# Patient Record
Sex: MALE | Race: ASIAN | NOT HISPANIC OR LATINO | Employment: UNEMPLOYED | URBAN - METROPOLITAN AREA
[De-identification: names, ages, dates, MRNs, and addresses within clinical notes are randomized per-mention and may not be internally consistent; named-entity substitution may affect disease eponyms.]

---

## 2017-01-13 ENCOUNTER — OFFICE VISIT - HEALTHEAST (OUTPATIENT)
Dept: FAMILY MEDICINE | Facility: CLINIC | Age: 67
End: 2017-01-13

## 2017-01-13 DIAGNOSIS — L30.1 ECZEMA, DYSHIDROTIC: ICD-10-CM

## 2017-01-13 DIAGNOSIS — I69.391 DYSPHAGIA AS LATE EFFECT OF STROKE: ICD-10-CM

## 2017-01-13 DIAGNOSIS — I10 HYPERTENSION: ICD-10-CM

## 2017-01-13 DIAGNOSIS — L30.9 DERMATITIS: ICD-10-CM

## 2017-01-13 DIAGNOSIS — F32.4 MAJOR DEPRESSIVE DISORDER WITH SINGLE EPISODE, IN PARTIAL REMISSION (H): ICD-10-CM

## 2017-01-23 ENCOUNTER — OFFICE VISIT - HEALTHEAST (OUTPATIENT)
Dept: CARDIOLOGY | Facility: CLINIC | Age: 67
End: 2017-01-23

## 2017-01-23 DIAGNOSIS — R06.09 DYSPNEA ON EXERTION: ICD-10-CM

## 2017-01-23 DIAGNOSIS — E78.5 HYPERLIPIDEMIA: ICD-10-CM

## 2017-01-23 DIAGNOSIS — R07.2 PRECORDIAL PAIN: ICD-10-CM

## 2017-01-23 DIAGNOSIS — Z86.73 HISTORY OF STROKE: ICD-10-CM

## 2017-01-23 DIAGNOSIS — I10 ESSENTIAL HYPERTENSION: ICD-10-CM

## 2017-01-23 ASSESSMENT — MIFFLIN-ST. JEOR: SCORE: 1176.47

## 2017-01-24 ENCOUNTER — AMBULATORY - HEALTHEAST (OUTPATIENT)
Dept: CARDIOLOGY | Facility: CLINIC | Age: 67
End: 2017-01-24

## 2017-01-24 DIAGNOSIS — R07.89 CHEST TIGHTNESS: ICD-10-CM

## 2017-01-24 DIAGNOSIS — R79.89 POSITIVE D DIMER: ICD-10-CM

## 2017-01-24 DIAGNOSIS — I10 HTN (HYPERTENSION): ICD-10-CM

## 2017-01-24 DIAGNOSIS — R06.00 DYSPNEA: ICD-10-CM

## 2017-01-26 ENCOUNTER — HOSPITAL ENCOUNTER (OUTPATIENT)
Dept: CT IMAGING | Facility: HOSPITAL | Age: 67
Discharge: HOME | End: 2017-01-26
Attending: INTERNAL MEDICINE

## 2017-01-26 DIAGNOSIS — R79.89 POSITIVE D DIMER: ICD-10-CM

## 2017-01-26 DIAGNOSIS — R07.89 CHEST TIGHTNESS: ICD-10-CM

## 2017-01-26 DIAGNOSIS — R06.00 DYSPNEA: ICD-10-CM

## 2017-01-31 ENCOUNTER — HOSPITAL ENCOUNTER (OUTPATIENT)
Dept: CARDIOLOGY | Facility: HOSPITAL | Age: 67
Discharge: HOME | End: 2017-01-31
Attending: INTERNAL MEDICINE

## 2017-01-31 ENCOUNTER — OFFICE VISIT - HEALTHEAST (OUTPATIENT)
Dept: FAMILY MEDICINE | Facility: CLINIC | Age: 67
End: 2017-01-31

## 2017-01-31 ENCOUNTER — HOSPITAL ENCOUNTER (OUTPATIENT)
Dept: NUCLEAR MEDICINE | Facility: HOSPITAL | Age: 67
Discharge: HOME | End: 2017-01-31
Attending: INTERNAL MEDICINE

## 2017-01-31 DIAGNOSIS — R06.09 OTHER FORMS OF DYSPNEA: ICD-10-CM

## 2017-01-31 DIAGNOSIS — F32.A DEPRESSION: ICD-10-CM

## 2017-01-31 DIAGNOSIS — R06.09 DYSPNEA ON EXERTION: ICD-10-CM

## 2017-01-31 DIAGNOSIS — R07.2 PRECORDIAL PAIN: ICD-10-CM

## 2017-01-31 DIAGNOSIS — I10 ESSENTIAL HYPERTENSION: ICD-10-CM

## 2017-01-31 DIAGNOSIS — Z91.148 NONCOMPLIANCE WITH MEDICATIONS: ICD-10-CM

## 2017-01-31 DIAGNOSIS — Z86.73 HISTORY OF STROKE: ICD-10-CM

## 2017-01-31 LAB
CV STRESS CURRENT BP HE: NORMAL
CV STRESS CURRENT HR HE: 100
CV STRESS CURRENT HR HE: 102
CV STRESS CURRENT HR HE: 63
CV STRESS CURRENT HR HE: 64
CV STRESS CURRENT HR HE: 72
CV STRESS CURRENT HR HE: 90
CV STRESS CURRENT HR HE: 91
CV STRESS CURRENT HR HE: 92
CV STRESS CURRENT HR HE: 95
CV STRESS CURRENT HR HE: 96
CV STRESS CURRENT HR HE: 97
CV STRESS CURRENT HR HE: 97
CV STRESS CURRENT HR HE: 98
CV STRESS DEVIATION TIME HE: NORMAL
CV STRESS EXERCISE STAGE HE: NORMAL
CV STRESS FINAL RESTING BP HE: NORMAL
CV STRESS FINAL RESTING HR HE: 92
CV STRESS MAX HR HE: 101
CV STRESS MAX TREADMILL GRADE HE: 0
CV STRESS MAX TREADMILL SPEED HE: 0
CV STRESS PEAK DIA BP HE: NORMAL
CV STRESS PEAK SYS BP HE: NORMAL
CV STRESS PHASE HE: NORMAL
CV STRESS PROTOCOL HE: NORMAL
CV STRESS RESTING PT POSITION HE: NORMAL
CV STRESS ST DEVIATION AMOUNT HE: NORMAL
CV STRESS ST DEVIATION ELEVATION HE: NORMAL
CV STRESS ST EVELATION AMOUNT HE: NORMAL
CV STRESS TEST TYPE HE: NORMAL
CV STRESS TOTAL STAGE TIME MIN 1 HE: NORMAL
NUC STRESS EJECTION FRACTION: 73 %
STRESS ECHO BASELINE BP: NORMAL
STRESS ECHO BASELINE HR: 60
STRESS ECHO LAST STRESS BP: NORMAL
STRESS ECHO LAST STRESS HR: 102

## 2017-01-31 ASSESSMENT — MIFFLIN-ST. JEOR
SCORE: 1176.41
SCORE: 1190.01

## 2017-02-06 ENCOUNTER — COMMUNICATION - HEALTHEAST (OUTPATIENT)
Dept: CARDIOLOGY | Facility: CLINIC | Age: 67
End: 2017-02-06

## 2017-02-10 ENCOUNTER — OFFICE VISIT - HEALTHEAST (OUTPATIENT)
Dept: FAMILY MEDICINE | Facility: CLINIC | Age: 67
End: 2017-02-10

## 2017-02-10 DIAGNOSIS — L30.9 DERMATITIS: ICD-10-CM

## 2017-02-10 DIAGNOSIS — F32.A DEPRESSION: ICD-10-CM

## 2017-02-10 DIAGNOSIS — I10 HYPERTENSION: ICD-10-CM

## 2017-02-10 DIAGNOSIS — R07.2 PRECORDIAL PAIN: ICD-10-CM

## 2017-02-10 DIAGNOSIS — R63.0 ANOREXIA: ICD-10-CM

## 2017-04-11 ENCOUNTER — OFFICE VISIT - HEALTHEAST (OUTPATIENT)
Dept: FAMILY MEDICINE | Facility: CLINIC | Age: 67
End: 2017-04-11

## 2017-04-11 DIAGNOSIS — F32.A DEPRESSION: ICD-10-CM

## 2017-04-11 DIAGNOSIS — I10 HYPERTENSION: ICD-10-CM

## 2017-04-11 DIAGNOSIS — Z86.73 HISTORY OF STROKE: ICD-10-CM

## 2017-04-11 DIAGNOSIS — E78.5 HYPERLIPIDEMIA: ICD-10-CM

## 2017-06-26 ENCOUNTER — COMMUNICATION - HEALTHEAST (OUTPATIENT)
Dept: FAMILY MEDICINE | Facility: CLINIC | Age: 67
End: 2017-06-26

## 2017-06-26 DIAGNOSIS — I63.9 CVA (CEREBRAL VASCULAR ACCIDENT) (H): ICD-10-CM

## 2017-07-24 ENCOUNTER — COMMUNICATION - HEALTHEAST (OUTPATIENT)
Dept: FAMILY MEDICINE | Facility: CLINIC | Age: 67
End: 2017-07-24

## 2017-08-11 ENCOUNTER — OFFICE VISIT - HEALTHEAST (OUTPATIENT)
Dept: FAMILY MEDICINE | Facility: CLINIC | Age: 67
End: 2017-08-11

## 2017-08-11 DIAGNOSIS — I63.9 CVA (CEREBRAL VASCULAR ACCIDENT) (H): ICD-10-CM

## 2017-08-11 DIAGNOSIS — R04.2 COUGHING BLOOD: ICD-10-CM

## 2017-08-11 DIAGNOSIS — E78.5 HYPERLIPIDEMIA: ICD-10-CM

## 2017-08-11 DIAGNOSIS — R63.0 ANOREXIA: ICD-10-CM

## 2017-08-11 DIAGNOSIS — J31.0 RHINITIS: ICD-10-CM

## 2017-08-11 DIAGNOSIS — Z23 ENCOUNTER TO VACCINATE PATIENT: ICD-10-CM

## 2017-08-11 DIAGNOSIS — F32.A DEPRESSION: ICD-10-CM

## 2017-08-11 DIAGNOSIS — Z86.73 HISTORY OF STROKE: ICD-10-CM

## 2017-09-07 ENCOUNTER — OFFICE VISIT - HEALTHEAST (OUTPATIENT)
Dept: FAMILY MEDICINE | Facility: CLINIC | Age: 67
End: 2017-09-07

## 2017-09-07 DIAGNOSIS — I10 HYPERTENSION: ICD-10-CM

## 2017-09-07 DIAGNOSIS — E78.5 HYPERLIPIDEMIA: ICD-10-CM

## 2017-09-07 DIAGNOSIS — F32.A DEPRESSION: ICD-10-CM

## 2017-09-07 DIAGNOSIS — J31.0 RHINITIS: ICD-10-CM

## 2017-09-07 DIAGNOSIS — Z12.11 SCREEN FOR COLON CANCER: ICD-10-CM

## 2017-09-07 DIAGNOSIS — I69.391 DYSPHAGIA AS LATE EFFECT OF STROKE: ICD-10-CM

## 2017-09-07 DIAGNOSIS — Z00.00 PREVENTATIVE HEALTH CARE: ICD-10-CM

## 2017-09-20 ENCOUNTER — AMBULATORY - HEALTHEAST (OUTPATIENT)
Dept: NURSING | Facility: CLINIC | Age: 67
End: 2017-09-20

## 2017-09-26 ENCOUNTER — RECORDS - HEALTHEAST (OUTPATIENT)
Dept: ADMINISTRATIVE | Facility: OTHER | Age: 67
End: 2017-09-26

## 2017-10-11 ENCOUNTER — OFFICE VISIT - HEALTHEAST (OUTPATIENT)
Dept: FAMILY MEDICINE | Facility: CLINIC | Age: 67
End: 2017-10-11

## 2017-10-11 DIAGNOSIS — F32.4 MAJOR DEPRESSIVE DISORDER WITH SINGLE EPISODE, IN PARTIAL REMISSION (H): ICD-10-CM

## 2017-10-11 DIAGNOSIS — Z00.00 PREVENTATIVE HEALTH CARE: ICD-10-CM

## 2017-10-11 DIAGNOSIS — E78.00 PURE HYPERCHOLESTEROLEMIA: ICD-10-CM

## 2017-10-11 DIAGNOSIS — F51.02 ADJUSTMENT INSOMNIA: ICD-10-CM

## 2017-10-11 DIAGNOSIS — I10 ESSENTIAL HYPERTENSION: ICD-10-CM

## 2017-10-11 LAB
CHOLEST SERPL-MCNC: 192 MG/DL
FASTING STATUS PATIENT QL REPORTED: YES
HDLC SERPL-MCNC: 45 MG/DL
LDLC SERPL CALC-MCNC: 110 MG/DL
TRIGL SERPL-MCNC: 185 MG/DL

## 2017-11-20 ENCOUNTER — COMMUNICATION - HEALTHEAST (OUTPATIENT)
Dept: FAMILY MEDICINE | Facility: CLINIC | Age: 67
End: 2017-11-20

## 2017-11-20 DIAGNOSIS — K21.9 GERD (GASTROESOPHAGEAL REFLUX DISEASE): ICD-10-CM

## 2017-11-29 ENCOUNTER — OFFICE VISIT - HEALTHEAST (OUTPATIENT)
Dept: FAMILY MEDICINE | Facility: CLINIC | Age: 67
End: 2017-11-29

## 2017-11-29 DIAGNOSIS — J40 BRONCHITIS: ICD-10-CM

## 2017-11-29 DIAGNOSIS — I10 ESSENTIAL HYPERTENSION: ICD-10-CM

## 2017-11-29 DIAGNOSIS — F51.02 ADJUSTMENT INSOMNIA: ICD-10-CM

## 2017-11-29 DIAGNOSIS — K21.9 GASTROESOPHAGEAL REFLUX DISEASE WITHOUT ESOPHAGITIS: ICD-10-CM

## 2017-11-29 DIAGNOSIS — J31.0 RHINITIS: ICD-10-CM

## 2017-12-07 ENCOUNTER — OFFICE VISIT - HEALTHEAST (OUTPATIENT)
Dept: FAMILY MEDICINE | Facility: CLINIC | Age: 67
End: 2017-12-07

## 2017-12-07 DIAGNOSIS — R05.9 COUGH: ICD-10-CM

## 2017-12-07 DIAGNOSIS — J40 BRONCHITIS: ICD-10-CM

## 2017-12-07 DIAGNOSIS — R63.4 WEIGHT LOSS: ICD-10-CM

## 2017-12-12 ENCOUNTER — OFFICE VISIT - HEALTHEAST (OUTPATIENT)
Dept: FAMILY MEDICINE | Facility: CLINIC | Age: 67
End: 2017-12-12

## 2017-12-12 DIAGNOSIS — F32.A DEPRESSION: ICD-10-CM

## 2017-12-12 DIAGNOSIS — R05.3 CHRONIC COUGH: ICD-10-CM

## 2017-12-12 DIAGNOSIS — R63.0 ANOREXIA: ICD-10-CM

## 2017-12-12 DIAGNOSIS — G47.00 INSOMNIA: ICD-10-CM

## 2018-02-21 ENCOUNTER — OFFICE VISIT - HEALTHEAST (OUTPATIENT)
Dept: FAMILY MEDICINE | Facility: CLINIC | Age: 68
End: 2018-02-21

## 2018-02-21 DIAGNOSIS — R63.0 ANOREXIA: ICD-10-CM

## 2018-02-21 DIAGNOSIS — G47.00 INSOMNIA: ICD-10-CM

## 2018-02-21 DIAGNOSIS — F32.A DEPRESSION: ICD-10-CM

## 2018-02-21 DIAGNOSIS — I10 ESSENTIAL HYPERTENSION: ICD-10-CM

## 2018-02-21 DIAGNOSIS — E78.5 HYPERLIPIDEMIA: ICD-10-CM

## 2018-02-21 DIAGNOSIS — R05.3 CHRONIC COUGH: ICD-10-CM

## 2018-04-24 ENCOUNTER — COMMUNICATION - HEALTHEAST (OUTPATIENT)
Dept: FAMILY MEDICINE | Facility: CLINIC | Age: 68
End: 2018-04-24

## 2018-04-24 DIAGNOSIS — F32.4 MAJOR DEPRESSIVE DISORDER WITH SINGLE EPISODE, IN PARTIAL REMISSION (H): ICD-10-CM

## 2018-04-25 ENCOUNTER — COMMUNICATION - HEALTHEAST (OUTPATIENT)
Dept: FAMILY MEDICINE | Facility: CLINIC | Age: 68
End: 2018-04-25

## 2018-04-25 DIAGNOSIS — I63.9 CVA (CEREBRAL VASCULAR ACCIDENT) (H): ICD-10-CM

## 2018-05-10 ENCOUNTER — OFFICE VISIT - HEALTHEAST (OUTPATIENT)
Dept: FAMILY MEDICINE | Facility: CLINIC | Age: 68
End: 2018-05-10

## 2018-05-10 DIAGNOSIS — I69.391 DYSPHAGIA AS LATE EFFECT OF STROKE: ICD-10-CM

## 2018-05-10 DIAGNOSIS — R63.0 ANOREXIA: ICD-10-CM

## 2018-05-10 DIAGNOSIS — F32.4 MAJOR DEPRESSIVE DISORDER WITH SINGLE EPISODE, IN PARTIAL REMISSION (H): ICD-10-CM

## 2018-05-10 DIAGNOSIS — E78.1 PURE HYPERGLYCERIDEMIA: ICD-10-CM

## 2018-05-10 DIAGNOSIS — F51.01 PRIMARY INSOMNIA: ICD-10-CM

## 2018-05-10 DIAGNOSIS — I10 ESSENTIAL HYPERTENSION: ICD-10-CM

## 2018-05-10 ASSESSMENT — MIFFLIN-ST. JEOR: SCORE: 1154.29

## 2018-06-12 ENCOUNTER — COMMUNICATION - HEALTHEAST (OUTPATIENT)
Dept: FAMILY MEDICINE | Facility: CLINIC | Age: 68
End: 2018-06-12

## 2018-06-12 ENCOUNTER — OFFICE VISIT - HEALTHEAST (OUTPATIENT)
Dept: FAMILY MEDICINE | Facility: CLINIC | Age: 68
End: 2018-06-12

## 2018-06-12 DIAGNOSIS — F51.02 ADJUSTMENT INSOMNIA: ICD-10-CM

## 2018-06-12 DIAGNOSIS — J00 OTHER ACUTE RHINITIS: ICD-10-CM

## 2018-06-12 DIAGNOSIS — F32.4 MAJOR DEPRESSIVE DISORDER WITH SINGLE EPISODE, IN PARTIAL REMISSION (H): ICD-10-CM

## 2018-06-12 DIAGNOSIS — I10 HYPERTENSION: ICD-10-CM

## 2018-06-12 DIAGNOSIS — F32.9 REACTIVE DEPRESSION: ICD-10-CM

## 2018-06-12 ASSESSMENT — MIFFLIN-ST. JEOR: SCORE: 1162.8

## 2018-06-26 ENCOUNTER — RECORDS - HEALTHEAST (OUTPATIENT)
Dept: ADMINISTRATIVE | Facility: OTHER | Age: 68
End: 2018-06-26

## 2018-06-27 ENCOUNTER — OFFICE VISIT - HEALTHEAST (OUTPATIENT)
Dept: FAMILY MEDICINE | Facility: CLINIC | Age: 68
End: 2018-06-27

## 2018-06-27 DIAGNOSIS — R42 DIZZINESS: ICD-10-CM

## 2018-06-27 DIAGNOSIS — R53.83 FATIGUE: ICD-10-CM

## 2018-06-27 DIAGNOSIS — I10 HYPERTENSION: ICD-10-CM

## 2018-06-27 DIAGNOSIS — F51.01 PRIMARY INSOMNIA: ICD-10-CM

## 2018-06-27 LAB
BASOPHILS # BLD AUTO: 0.1 THOU/UL (ref 0–0.2)
BASOPHILS NFR BLD AUTO: 1 % (ref 0–2)
EOSINOPHIL # BLD AUTO: 0.9 THOU/UL (ref 0–0.4)
EOSINOPHIL NFR BLD AUTO: 9 % (ref 0–6)
ERYTHROCYTE [DISTWIDTH] IN BLOOD BY AUTOMATED COUNT: 14.4 % (ref 11–14.5)
HCT VFR BLD AUTO: 37.7 % (ref 40–54)
HGB BLD-MCNC: 12.3 G/DL (ref 14–18)
LYMPHOCYTES # BLD AUTO: 2.3 THOU/UL (ref 0.8–4.4)
LYMPHOCYTES NFR BLD AUTO: 25 % (ref 20–40)
MCH RBC QN AUTO: 28 PG (ref 27–34)
MCHC RBC AUTO-ENTMCNC: 32.7 G/DL (ref 32–36)
MCV RBC AUTO: 86 FL (ref 80–100)
MONOCYTES # BLD AUTO: 0.9 THOU/UL (ref 0–0.9)
MONOCYTES NFR BLD AUTO: 10 % (ref 2–10)
NEUTROPHILS # BLD AUTO: 5.2 THOU/UL (ref 2–7.7)
NEUTROPHILS NFR BLD AUTO: 55 % (ref 50–70)
PLATELET # BLD AUTO: 384 THOU/UL (ref 140–440)
PMV BLD AUTO: 7.4 FL (ref 7–10)
RBC # BLD AUTO: 4.4 MILL/UL (ref 4.4–6.2)
WBC: 9.4 THOU/UL (ref 4–11)

## 2018-06-27 ASSESSMENT — MIFFLIN-ST. JEOR: SCORE: 1176.69

## 2018-06-28 LAB
ANION GAP SERPL CALCULATED.3IONS-SCNC: 8 MMOL/L (ref 5–18)
BUN SERPL-MCNC: 14 MG/DL (ref 8–22)
CALCIUM SERPL-MCNC: 9.3 MG/DL (ref 8.5–10.5)
CHLORIDE BLD-SCNC: 105 MMOL/L (ref 98–107)
CO2 SERPL-SCNC: 26 MMOL/L (ref 22–31)
CREAT SERPL-MCNC: 1.28 MG/DL (ref 0.7–1.3)
GFR SERPL CREATININE-BSD FRML MDRD: 56 ML/MIN/1.73M2
GLUCOSE BLD-MCNC: 122 MG/DL (ref 70–125)
POTASSIUM BLD-SCNC: 4.8 MMOL/L (ref 3.5–5)
SODIUM SERPL-SCNC: 139 MMOL/L (ref 136–145)

## 2018-07-12 ENCOUNTER — COMMUNICATION - HEALTHEAST (OUTPATIENT)
Dept: FAMILY MEDICINE | Facility: CLINIC | Age: 68
End: 2018-07-12

## 2018-10-03 ENCOUNTER — COMMUNICATION - HEALTHEAST (OUTPATIENT)
Dept: FAMILY MEDICINE | Facility: CLINIC | Age: 68
End: 2018-10-03

## 2018-10-03 DIAGNOSIS — E78.1 PURE HYPERGLYCERIDEMIA: ICD-10-CM

## 2018-11-27 ENCOUNTER — OFFICE VISIT - HEALTHEAST (OUTPATIENT)
Dept: FAMILY MEDICINE | Facility: CLINIC | Age: 68
End: 2018-11-27

## 2018-11-27 DIAGNOSIS — F51.01 PRIMARY INSOMNIA: ICD-10-CM

## 2018-11-27 DIAGNOSIS — R05.3 CHRONIC COUGH: ICD-10-CM

## 2018-11-27 DIAGNOSIS — R63.0 ANOREXIA: ICD-10-CM

## 2018-11-27 DIAGNOSIS — I10 ESSENTIAL HYPERTENSION: ICD-10-CM

## 2018-11-27 DIAGNOSIS — F32.9 REACTIVE DEPRESSION: ICD-10-CM

## 2018-11-27 ASSESSMENT — MIFFLIN-ST. JEOR: SCORE: 1163.65

## 2019-01-10 ENCOUNTER — COMMUNICATION - HEALTHEAST (OUTPATIENT)
Dept: FAMILY MEDICINE | Facility: CLINIC | Age: 69
End: 2019-01-10

## 2019-01-10 DIAGNOSIS — K21.9 GASTROESOPHAGEAL REFLUX DISEASE WITHOUT ESOPHAGITIS: ICD-10-CM

## 2019-02-11 ENCOUNTER — COMMUNICATION - HEALTHEAST (OUTPATIENT)
Dept: FAMILY MEDICINE | Facility: CLINIC | Age: 69
End: 2019-02-11

## 2019-02-11 DIAGNOSIS — F32.9 REACTIVE DEPRESSION: ICD-10-CM

## 2019-02-11 DIAGNOSIS — I63.9 CVA (CEREBRAL VASCULAR ACCIDENT) (H): ICD-10-CM

## 2019-02-12 ENCOUNTER — COMMUNICATION - HEALTHEAST (OUTPATIENT)
Dept: FAMILY MEDICINE | Facility: CLINIC | Age: 69
End: 2019-02-12

## 2019-02-12 DIAGNOSIS — R05.3 CHRONIC COUGH: ICD-10-CM

## 2019-03-20 ENCOUNTER — OFFICE VISIT - HEALTHEAST (OUTPATIENT)
Dept: FAMILY MEDICINE | Facility: CLINIC | Age: 69
End: 2019-03-20

## 2019-03-20 DIAGNOSIS — I95.9 HYPOTENSION, UNSPECIFIED HYPOTENSION TYPE: ICD-10-CM

## 2019-03-20 DIAGNOSIS — J31.0 OTHER RHINITIS: ICD-10-CM

## 2019-03-20 DIAGNOSIS — E78.1 PURE HYPERGLYCERIDEMIA: ICD-10-CM

## 2019-03-20 DIAGNOSIS — F32.9 REACTIVE DEPRESSION: ICD-10-CM

## 2019-03-20 LAB
ANION GAP SERPL CALCULATED.3IONS-SCNC: 11 MMOL/L (ref 5–18)
BASOPHILS # BLD AUTO: 0.1 THOU/UL (ref 0–0.2)
BASOPHILS NFR BLD AUTO: 1 % (ref 0–2)
BUN SERPL-MCNC: 21 MG/DL (ref 8–28)
CALCIUM SERPL-MCNC: 9.5 MG/DL (ref 8.5–10.5)
CHLORIDE BLD-SCNC: 106 MMOL/L (ref 98–107)
CO2 SERPL-SCNC: 23 MMOL/L (ref 22–31)
CREAT SERPL-MCNC: 1.37 MG/DL (ref 0.7–1.3)
EOSINOPHIL # BLD AUTO: 0.7 THOU/UL (ref 0–0.4)
EOSINOPHIL NFR BLD AUTO: 7 % (ref 0–6)
ERYTHROCYTE [DISTWIDTH] IN BLOOD BY AUTOMATED COUNT: 13.6 % (ref 11–14.5)
GFR SERPL CREATININE-BSD FRML MDRD: 51 ML/MIN/1.73M2
GLUCOSE BLD-MCNC: 84 MG/DL (ref 70–125)
HCT VFR BLD AUTO: 39.3 % (ref 40–54)
HGB BLD-MCNC: 13.1 G/DL (ref 14–18)
LYMPHOCYTES # BLD AUTO: 2.5 THOU/UL (ref 0.8–4.4)
LYMPHOCYTES NFR BLD AUTO: 25 % (ref 20–40)
MCH RBC QN AUTO: 29.3 PG (ref 27–34)
MCHC RBC AUTO-ENTMCNC: 33.4 G/DL (ref 32–36)
MCV RBC AUTO: 88 FL (ref 80–100)
MONOCYTES # BLD AUTO: 0.9 THOU/UL (ref 0–0.9)
MONOCYTES NFR BLD AUTO: 9 % (ref 2–10)
NEUTROPHILS # BLD AUTO: 5.8 THOU/UL (ref 2–7.7)
NEUTROPHILS NFR BLD AUTO: 58 % (ref 50–70)
PLATELET # BLD AUTO: 318 THOU/UL (ref 140–440)
PMV BLD AUTO: 6.9 FL (ref 7–10)
POTASSIUM BLD-SCNC: 4.5 MMOL/L (ref 3.5–5)
RBC # BLD AUTO: 4.48 MILL/UL (ref 4.4–6.2)
SODIUM SERPL-SCNC: 140 MMOL/L (ref 136–145)
WBC: 10.1 THOU/UL (ref 4–11)

## 2019-03-20 ASSESSMENT — MIFFLIN-ST. JEOR: SCORE: 1176.41

## 2019-03-28 ENCOUNTER — OFFICE VISIT - HEALTHEAST (OUTPATIENT)
Dept: FAMILY MEDICINE | Facility: CLINIC | Age: 69
End: 2019-03-28

## 2019-03-28 DIAGNOSIS — I10 ESSENTIAL HYPERTENSION: ICD-10-CM

## 2019-03-28 DIAGNOSIS — F51.01 PRIMARY INSOMNIA: ICD-10-CM

## 2019-03-28 DIAGNOSIS — F32.9 REACTIVE DEPRESSION: ICD-10-CM

## 2019-03-28 ASSESSMENT — MIFFLIN-ST. JEOR: SCORE: 1173.85

## 2019-04-10 ENCOUNTER — COMMUNICATION - HEALTHEAST (OUTPATIENT)
Dept: ADMINISTRATIVE | Facility: CLINIC | Age: 69
End: 2019-04-10

## 2019-05-20 ENCOUNTER — COMMUNICATION - HEALTHEAST (OUTPATIENT)
Dept: GERIATRIC MEDICINE | Facility: CLINIC | Age: 69
End: 2019-05-20

## 2019-05-23 ENCOUNTER — COMMUNICATION - HEALTHEAST (OUTPATIENT)
Dept: GERIATRIC MEDICINE | Facility: CLINIC | Age: 69
End: 2019-05-23

## 2019-05-28 ENCOUNTER — COMMUNICATION - HEALTHEAST (OUTPATIENT)
Dept: GERIATRIC MEDICINE | Facility: CLINIC | Age: 69
End: 2019-05-28

## 2019-06-20 ENCOUNTER — OFFICE VISIT - HEALTHEAST (OUTPATIENT)
Dept: FAMILY MEDICINE | Facility: CLINIC | Age: 69
End: 2019-06-20

## 2019-06-20 ENCOUNTER — AMBULATORY - HEALTHEAST (OUTPATIENT)
Dept: FAMILY MEDICINE | Facility: CLINIC | Age: 69
End: 2019-06-20

## 2019-06-20 DIAGNOSIS — Z76.89 ENCOUNTER TO ESTABLISH CARE: ICD-10-CM

## 2019-06-20 DIAGNOSIS — I10 ESSENTIAL HYPERTENSION: ICD-10-CM

## 2019-06-20 DIAGNOSIS — H54.40 BLIND LEFT EYE: ICD-10-CM

## 2019-06-20 DIAGNOSIS — R07.89 CHEST TIGHTNESS OR PRESSURE: ICD-10-CM

## 2019-06-20 DIAGNOSIS — E78.1 PURE HYPERGLYCERIDEMIA: ICD-10-CM

## 2019-06-20 DIAGNOSIS — E78.2 MIXED HYPERLIPIDEMIA: ICD-10-CM

## 2019-06-20 DIAGNOSIS — F32.A DEPRESSION, UNSPECIFIED DEPRESSION TYPE: ICD-10-CM

## 2019-06-20 LAB
ALBUMIN SERPL-MCNC: 3.7 G/DL (ref 3.5–5)
ALP SERPL-CCNC: 92 U/L (ref 45–120)
ALT SERPL W P-5'-P-CCNC: 13 U/L (ref 0–45)
ANION GAP SERPL CALCULATED.3IONS-SCNC: 10 MMOL/L (ref 5–18)
AST SERPL W P-5'-P-CCNC: 18 U/L (ref 0–40)
BILIRUB SERPL-MCNC: 0.4 MG/DL (ref 0–1)
BUN SERPL-MCNC: 15 MG/DL (ref 8–28)
CALCIUM SERPL-MCNC: 9.5 MG/DL (ref 8.5–10.5)
CHLORIDE BLD-SCNC: 107 MMOL/L (ref 98–107)
CO2 SERPL-SCNC: 24 MMOL/L (ref 22–31)
CREAT SERPL-MCNC: 1.17 MG/DL (ref 0.7–1.3)
GFR SERPL CREATININE-BSD FRML MDRD: >60 ML/MIN/1.73M2
GLUCOSE BLD-MCNC: 84 MG/DL (ref 70–125)
POTASSIUM BLD-SCNC: 4.8 MMOL/L (ref 3.5–5)
PROT SERPL-MCNC: 7.7 G/DL (ref 6–8)
SODIUM SERPL-SCNC: 141 MMOL/L (ref 136–145)

## 2019-06-20 RX ORDER — BLOOD-GLUC.METER, WRIST BP MON
KIT MISCELLANEOUS
Qty: 1 DEVICE | Refills: 0 | Status: SHIPPED | OUTPATIENT
Start: 2019-06-20

## 2019-06-20 ASSESSMENT — MIFFLIN-ST. JEOR: SCORE: 1162.28

## 2019-06-24 ENCOUNTER — HOSPITAL ENCOUNTER (OUTPATIENT)
Dept: CARDIOLOGY | Facility: HOSPITAL | Age: 69
Discharge: HOME | End: 2019-06-24
Attending: FAMILY MEDICINE

## 2019-06-24 DIAGNOSIS — R07.89 CHEST TIGHTNESS OR PRESSURE: ICD-10-CM

## 2019-06-24 LAB
CV STRESS CURRENT BP HE: NORMAL
CV STRESS CURRENT HR HE: 56
CV STRESS CURRENT HR HE: 56
CV STRESS CURRENT HR HE: 57
CV STRESS CURRENT HR HE: 57
CV STRESS CURRENT HR HE: 58
CV STRESS CURRENT HR HE: 59
CV STRESS CURRENT HR HE: 60
CV STRESS CURRENT HR HE: 62
CV STRESS CURRENT HR HE: 67
CV STRESS CURRENT HR HE: 69
CV STRESS CURRENT HR HE: 70
CV STRESS CURRENT HR HE: 72
CV STRESS CURRENT HR HE: 72
CV STRESS CURRENT HR HE: 76
CV STRESS CURRENT HR HE: 77
CV STRESS CURRENT HR HE: 77
CV STRESS DEVIATION TIME HE: NORMAL
CV STRESS ECHO PERCENT HR HE: NORMAL
CV STRESS EXERCISE STAGE HE: NORMAL
CV STRESS EXERCISE STAGE REACHED HE: NORMAL
CV STRESS FINAL RESTING BP HE: NORMAL
CV STRESS FINAL RESTING HR HE: 60
CV STRESS MAX HR HE: 77
CV STRESS MAX TREADMILL GRADE HE: 0
CV STRESS MAX TREADMILL SPEED HE: 1.7
CV STRESS PEAK DIA BP HE: NORMAL
CV STRESS PEAK SYS BP HE: NORMAL
CV STRESS PHASE HE: NORMAL
CV STRESS PROTOCOL HE: NORMAL
CV STRESS RESTING PT POSITION HE: NORMAL
CV STRESS RESTING PT POSITION HE: NORMAL
CV STRESS ST DEVIATION AMOUNT HE: NORMAL
CV STRESS ST DEVIATION ELEVATION HE: NORMAL
CV STRESS ST EVELATION AMOUNT HE: NORMAL
CV STRESS TEST TYPE HE: NORMAL
CV STRESS TOTAL STAGE TIME MIN 1 HE: NORMAL
STRESS ECHO BASELINE BP: NORMAL
STRESS ECHO BASELINE HR: 63
STRESS ECHO CALCULATED PERCENT HR: 51 %
STRESS ECHO LAST STRESS BP: NORMAL
STRESS ECHO LAST STRESS HR: 77
STRESS ECHO POST ESTIMATED WORKLOAD: 2.4
STRESS ECHO POST EXERCISE DUR MIN: 1
STRESS ECHO POST EXERCISE DUR SEC: 32
STRESS ECHO TARGET HR: 76.5

## 2019-07-18 ENCOUNTER — OFFICE VISIT - HEALTHEAST (OUTPATIENT)
Dept: FAMILY MEDICINE | Facility: CLINIC | Age: 69
End: 2019-07-18

## 2019-07-18 DIAGNOSIS — E78.2 MIXED HYPERLIPIDEMIA: ICD-10-CM

## 2019-07-18 DIAGNOSIS — R07.89 CHEST TIGHTNESS OR PRESSURE: ICD-10-CM

## 2019-07-18 DIAGNOSIS — F32.A DEPRESSION, UNSPECIFIED DEPRESSION TYPE: ICD-10-CM

## 2019-07-18 DIAGNOSIS — I10 ESSENTIAL HYPERTENSION: ICD-10-CM

## 2019-07-18 ASSESSMENT — MIFFLIN-ST. JEOR: SCORE: 1150.81

## 2019-10-07 ENCOUNTER — COMMUNICATION - HEALTHEAST (OUTPATIENT)
Dept: FAMILY MEDICINE | Facility: CLINIC | Age: 69
End: 2019-10-07

## 2019-10-07 DIAGNOSIS — I63.9 CVA (CEREBRAL VASCULAR ACCIDENT) (H): ICD-10-CM

## 2019-10-07 DIAGNOSIS — E78.1 PURE HYPERGLYCERIDEMIA: ICD-10-CM

## 2019-11-14 ENCOUNTER — COMMUNICATION - HEALTHEAST (OUTPATIENT)
Dept: GERIATRIC MEDICINE | Facility: CLINIC | Age: 69
End: 2019-11-14

## 2019-12-20 ENCOUNTER — OFFICE VISIT - HEALTHEAST (OUTPATIENT)
Dept: FAMILY MEDICINE | Facility: CLINIC | Age: 69
End: 2019-12-20

## 2019-12-20 ENCOUNTER — COMMUNICATION - HEALTHEAST (OUTPATIENT)
Dept: FAMILY MEDICINE | Facility: CLINIC | Age: 69
End: 2019-12-20

## 2019-12-20 DIAGNOSIS — E78.2 MIXED HYPERLIPIDEMIA: ICD-10-CM

## 2019-12-20 DIAGNOSIS — Z00.00 WELLNESS EXAMINATION: ICD-10-CM

## 2019-12-20 DIAGNOSIS — F32.1 CURRENT MODERATE EPISODE OF MAJOR DEPRESSIVE DISORDER, UNSPECIFIED WHETHER RECURRENT (H): ICD-10-CM

## 2019-12-20 DIAGNOSIS — Z23 IMMUNIZATION DUE: ICD-10-CM

## 2019-12-20 DIAGNOSIS — R41.3 MEMORY PROBLEM: ICD-10-CM

## 2019-12-20 DIAGNOSIS — I10 ESSENTIAL HYPERTENSION: ICD-10-CM

## 2019-12-20 LAB
CHOLEST SERPL-MCNC: 156 MG/DL
FASTING STATUS PATIENT QL REPORTED: NO
HDLC SERPL-MCNC: 44 MG/DL
LDLC SERPL CALC-MCNC: 84 MG/DL
TRIGL SERPL-MCNC: 138 MG/DL

## 2019-12-20 ASSESSMENT — MIFFLIN-ST. JEOR: SCORE: 1152.65

## 2019-12-20 ASSESSMENT — PATIENT HEALTH QUESTIONNAIRE - PHQ9: SUM OF ALL RESPONSES TO PHQ QUESTIONS 1-9: 7

## 2019-12-22 ENCOUNTER — COMMUNICATION - HEALTHEAST (OUTPATIENT)
Dept: FAMILY MEDICINE | Facility: CLINIC | Age: 69
End: 2019-12-22

## 2019-12-22 DIAGNOSIS — K21.9 GASTROESOPHAGEAL REFLUX DISEASE WITHOUT ESOPHAGITIS: ICD-10-CM

## 2019-12-26 ENCOUNTER — AMBULATORY - HEALTHEAST (OUTPATIENT)
Dept: FAMILY MEDICINE | Facility: CLINIC | Age: 69
End: 2019-12-26

## 2020-02-05 ENCOUNTER — COMMUNICATION - HEALTHEAST (OUTPATIENT)
Dept: FAMILY MEDICINE | Facility: CLINIC | Age: 70
End: 2020-02-05

## 2020-02-05 DIAGNOSIS — F32.9 REACTIVE DEPRESSION: ICD-10-CM

## 2020-02-06 ENCOUNTER — COMMUNICATION - HEALTHEAST (OUTPATIENT)
Dept: FAMILY MEDICINE | Facility: CLINIC | Age: 70
End: 2020-02-06

## 2020-02-06 DIAGNOSIS — J00 ACUTE RHINITIS: ICD-10-CM

## 2020-04-03 ENCOUNTER — OFFICE VISIT - HEALTHEAST (OUTPATIENT)
Dept: FAMILY MEDICINE | Facility: CLINIC | Age: 70
End: 2020-04-03

## 2020-04-03 ENCOUNTER — COMMUNICATION - HEALTHEAST (OUTPATIENT)
Dept: GERIATRIC MEDICINE | Facility: CLINIC | Age: 70
End: 2020-04-03

## 2020-04-03 DIAGNOSIS — I10 ESSENTIAL HYPERTENSION: ICD-10-CM

## 2020-04-03 DIAGNOSIS — R41.3 MEMORY PROBLEM: ICD-10-CM

## 2020-04-03 DIAGNOSIS — E78.2 MIXED HYPERLIPIDEMIA: ICD-10-CM

## 2020-04-03 DIAGNOSIS — R05.3 CHRONIC COUGH: ICD-10-CM

## 2020-04-03 DIAGNOSIS — K21.9 GASTROESOPHAGEAL REFLUX DISEASE WITHOUT ESOPHAGITIS: ICD-10-CM

## 2020-04-03 DIAGNOSIS — F32.9 REACTIVE DEPRESSION: ICD-10-CM

## 2020-04-03 DIAGNOSIS — E78.1 PURE HYPERGLYCERIDEMIA: ICD-10-CM

## 2020-04-04 ASSESSMENT — ACTIVITIES OF DAILY LIVING (ADL)
DEPENDENT_IADLS:: CLEANING;COOKING;LAUNDRY;SHOPPING;MEAL PREPARATION;MEDICATION MANAGEMENT;MONEY MANAGEMENT;TRANSPORTATION;INCONTINENCE

## 2020-04-06 ENCOUNTER — COMMUNICATION - HEALTHEAST (OUTPATIENT)
Dept: NURSING | Facility: CLINIC | Age: 70
End: 2020-04-06

## 2020-04-06 ENCOUNTER — COMMUNICATION - HEALTHEAST (OUTPATIENT)
Dept: GERIATRIC MEDICINE | Facility: CLINIC | Age: 70
End: 2020-04-06

## 2020-04-15 ENCOUNTER — COMMUNICATION - HEALTHEAST (OUTPATIENT)
Dept: CARE COORDINATION | Facility: CLINIC | Age: 70
End: 2020-04-15

## 2020-04-17 ENCOUNTER — COMMUNICATION - HEALTHEAST (OUTPATIENT)
Dept: NURSING | Facility: CLINIC | Age: 70
End: 2020-04-17

## 2020-05-18 ENCOUNTER — COMMUNICATION - HEALTHEAST (OUTPATIENT)
Dept: NURSING | Facility: CLINIC | Age: 70
End: 2020-05-18

## 2020-06-04 ENCOUNTER — OFFICE VISIT - HEALTHEAST (OUTPATIENT)
Dept: FAMILY MEDICINE | Facility: CLINIC | Age: 70
End: 2020-06-04

## 2020-06-04 DIAGNOSIS — I10 ESSENTIAL HYPERTENSION: ICD-10-CM

## 2020-06-04 DIAGNOSIS — F32.1 CURRENT MODERATE EPISODE OF MAJOR DEPRESSIVE DISORDER, UNSPECIFIED WHETHER RECURRENT (H): ICD-10-CM

## 2020-06-04 DIAGNOSIS — E78.2 MIXED HYPERLIPIDEMIA: ICD-10-CM

## 2020-06-04 ASSESSMENT — PATIENT HEALTH QUESTIONNAIRE - PHQ9: SUM OF ALL RESPONSES TO PHQ QUESTIONS 1-9: 5

## 2020-06-13 ENCOUNTER — COMMUNICATION - HEALTHEAST (OUTPATIENT)
Dept: FAMILY MEDICINE | Facility: CLINIC | Age: 70
End: 2020-06-13

## 2020-06-13 DIAGNOSIS — I63.9 CVA (CEREBRAL VASCULAR ACCIDENT) (H): ICD-10-CM

## 2020-06-17 ENCOUNTER — COMMUNICATION - HEALTHEAST (OUTPATIENT)
Dept: NURSING | Facility: CLINIC | Age: 70
End: 2020-06-17

## 2020-06-24 ENCOUNTER — COMMUNICATION - HEALTHEAST (OUTPATIENT)
Dept: CARE COORDINATION | Facility: CLINIC | Age: 70
End: 2020-06-24

## 2020-07-02 ENCOUNTER — OFFICE VISIT - HEALTHEAST (OUTPATIENT)
Dept: FAMILY MEDICINE | Facility: CLINIC | Age: 70
End: 2020-07-02

## 2020-07-02 DIAGNOSIS — R21 RASH: ICD-10-CM

## 2020-07-02 DIAGNOSIS — E78.2 MIXED HYPERLIPIDEMIA: ICD-10-CM

## 2020-07-02 DIAGNOSIS — I10 ESSENTIAL HYPERTENSION: ICD-10-CM

## 2020-07-17 ENCOUNTER — COMMUNICATION - HEALTHEAST (OUTPATIENT)
Dept: NURSING | Facility: CLINIC | Age: 70
End: 2020-07-17

## 2020-07-23 ENCOUNTER — COMMUNICATION - HEALTHEAST (OUTPATIENT)
Dept: CARE COORDINATION | Facility: CLINIC | Age: 70
End: 2020-07-23

## 2020-09-04 ENCOUNTER — OFFICE VISIT - HEALTHEAST (OUTPATIENT)
Dept: FAMILY MEDICINE | Facility: CLINIC | Age: 70
End: 2020-09-04

## 2020-09-04 DIAGNOSIS — E78.2 MIXED HYPERLIPIDEMIA: ICD-10-CM

## 2020-09-04 DIAGNOSIS — R21 RASH: ICD-10-CM

## 2020-09-04 DIAGNOSIS — F32.1 CURRENT MODERATE EPISODE OF MAJOR DEPRESSIVE DISORDER, UNSPECIFIED WHETHER RECURRENT (H): ICD-10-CM

## 2020-09-04 DIAGNOSIS — I10 ESSENTIAL HYPERTENSION: ICD-10-CM

## 2020-09-04 ASSESSMENT — PATIENT HEALTH QUESTIONNAIRE - PHQ9: SUM OF ALL RESPONSES TO PHQ QUESTIONS 1-9: 3

## 2020-09-08 ENCOUNTER — COMMUNICATION - HEALTHEAST (OUTPATIENT)
Dept: FAMILY MEDICINE | Facility: CLINIC | Age: 70
End: 2020-09-08

## 2020-09-17 ENCOUNTER — COMMUNICATION - HEALTHEAST (OUTPATIENT)
Dept: NURSING | Facility: CLINIC | Age: 70
End: 2020-09-17

## 2020-09-30 ENCOUNTER — COMMUNICATION - HEALTHEAST (OUTPATIENT)
Dept: CARE COORDINATION | Facility: CLINIC | Age: 70
End: 2020-09-30

## 2020-10-14 ENCOUNTER — COMMUNICATION - HEALTHEAST (OUTPATIENT)
Dept: GERIATRIC MEDICINE | Facility: CLINIC | Age: 70
End: 2020-10-14

## 2020-11-03 ENCOUNTER — OFFICE VISIT - HEALTHEAST (OUTPATIENT)
Dept: FAMILY MEDICINE | Facility: CLINIC | Age: 70
End: 2020-11-03

## 2020-11-03 DIAGNOSIS — E78.2 MIXED HYPERLIPIDEMIA: ICD-10-CM

## 2020-11-03 DIAGNOSIS — Z23 IMMUNIZATION DUE: ICD-10-CM

## 2020-11-03 DIAGNOSIS — I10 ESSENTIAL HYPERTENSION: ICD-10-CM

## 2020-11-03 DIAGNOSIS — L08.9 LOCAL INFECTION OF SKIN AND SUBCUTANEOUS TISSUE: ICD-10-CM

## 2020-11-03 LAB
ALBUMIN SERPL-MCNC: 3.8 G/DL (ref 3.5–5)
ALP SERPL-CCNC: 101 U/L (ref 45–120)
ALT SERPL W P-5'-P-CCNC: 9 U/L (ref 0–45)
ANION GAP SERPL CALCULATED.3IONS-SCNC: 7 MMOL/L (ref 5–18)
AST SERPL W P-5'-P-CCNC: 17 U/L (ref 0–40)
BILIRUB SERPL-MCNC: 0.5 MG/DL (ref 0–1)
BUN SERPL-MCNC: 18 MG/DL (ref 8–28)
CALCIUM SERPL-MCNC: 8.9 MG/DL (ref 8.5–10.5)
CHLORIDE BLD-SCNC: 107 MMOL/L (ref 98–107)
CHOLEST SERPL-MCNC: 164 MG/DL
CO2 SERPL-SCNC: 26 MMOL/L (ref 22–31)
CREAT SERPL-MCNC: 1.28 MG/DL (ref 0.7–1.3)
FASTING STATUS PATIENT QL REPORTED: NORMAL
GFR SERPL CREATININE-BSD FRML MDRD: 55 ML/MIN/1.73M2
GLUCOSE BLD-MCNC: 74 MG/DL (ref 70–125)
HDLC SERPL-MCNC: 42 MG/DL
LDLC SERPL CALC-MCNC: 102 MG/DL
POTASSIUM BLD-SCNC: 4.3 MMOL/L (ref 3.5–5)
PROT SERPL-MCNC: 7.7 G/DL (ref 6–8)
SODIUM SERPL-SCNC: 140 MMOL/L (ref 136–145)
TRIGL SERPL-MCNC: 101 MG/DL

## 2020-11-03 ASSESSMENT — MIFFLIN-ST. JEOR: SCORE: 1141.37

## 2020-11-12 ENCOUNTER — OFFICE VISIT - HEALTHEAST (OUTPATIENT)
Dept: FAMILY MEDICINE | Facility: CLINIC | Age: 70
End: 2020-11-12

## 2020-11-12 DIAGNOSIS — I10 ESSENTIAL HYPERTENSION: ICD-10-CM

## 2020-11-12 DIAGNOSIS — F32.1 CURRENT MODERATE EPISODE OF MAJOR DEPRESSIVE DISORDER, UNSPECIFIED WHETHER RECURRENT (H): ICD-10-CM

## 2020-11-12 DIAGNOSIS — E78.2 MIXED HYPERLIPIDEMIA: ICD-10-CM

## 2020-11-12 DIAGNOSIS — L08.9 LOCAL INFECTION OF SKIN AND SUBCUTANEOUS TISSUE: ICD-10-CM

## 2020-11-12 ASSESSMENT — MIFFLIN-ST. JEOR: SCORE: 1142.51

## 2020-11-20 ENCOUNTER — OFFICE VISIT - HEALTHEAST (OUTPATIENT)
Dept: FAMILY MEDICINE | Facility: CLINIC | Age: 70
End: 2020-11-20

## 2020-11-20 DIAGNOSIS — L08.9 LOCAL INFECTION OF SKIN AND SUBCUTANEOUS TISSUE: ICD-10-CM

## 2020-11-20 DIAGNOSIS — F51.01 PRIMARY INSOMNIA: ICD-10-CM

## 2020-11-20 DIAGNOSIS — L29.9 ITCHING: ICD-10-CM

## 2021-01-05 ENCOUNTER — COMMUNICATION - HEALTHEAST (OUTPATIENT)
Dept: FAMILY MEDICINE | Facility: CLINIC | Age: 71
End: 2021-01-05

## 2021-01-05 DIAGNOSIS — E78.1 PURE HYPERGLYCERIDEMIA: ICD-10-CM

## 2021-01-06 RX ORDER — SIMVASTATIN 20 MG
TABLET ORAL
Qty: 90 TABLET | Refills: 2 | Status: SHIPPED | OUTPATIENT
Start: 2021-01-06 | End: 2021-09-17

## 2021-01-27 ENCOUNTER — COMMUNICATION - HEALTHEAST (OUTPATIENT)
Dept: FAMILY MEDICINE | Facility: CLINIC | Age: 71
End: 2021-01-27

## 2021-01-27 DIAGNOSIS — F32.9 REACTIVE DEPRESSION: ICD-10-CM

## 2021-02-09 ENCOUNTER — OFFICE VISIT - HEALTHEAST (OUTPATIENT)
Dept: FAMILY MEDICINE | Facility: CLINIC | Age: 71
End: 2021-02-09

## 2021-02-09 DIAGNOSIS — L29.9 PRURITUS: ICD-10-CM

## 2021-02-09 DIAGNOSIS — R21 RASH AND NONSPECIFIC SKIN ERUPTION: ICD-10-CM

## 2021-02-09 ASSESSMENT — PATIENT HEALTH QUESTIONNAIRE - PHQ9: SUM OF ALL RESPONSES TO PHQ QUESTIONS 1-9: 12

## 2021-02-16 ENCOUNTER — OFFICE VISIT - HEALTHEAST (OUTPATIENT)
Dept: FAMILY MEDICINE | Facility: CLINIC | Age: 71
End: 2021-02-16

## 2021-02-16 DIAGNOSIS — I10 ESSENTIAL HYPERTENSION: ICD-10-CM

## 2021-02-16 DIAGNOSIS — R21 RASH AND NONSPECIFIC SKIN ERUPTION: ICD-10-CM

## 2021-02-16 DIAGNOSIS — E78.2 MIXED HYPERLIPIDEMIA: ICD-10-CM

## 2021-02-16 RX ORDER — CLOBETASOL PROPIONATE 0.5 MG/G
OINTMENT TOPICAL
Qty: 30 G | Refills: 0 | Status: SHIPPED | OUTPATIENT
Start: 2021-02-16 | End: 2022-12-21

## 2021-02-16 ASSESSMENT — MIFFLIN-ST. JEOR: SCORE: 1146.19

## 2021-02-23 ENCOUNTER — COMMUNICATION - HEALTHEAST (OUTPATIENT)
Dept: FAMILY MEDICINE | Facility: CLINIC | Age: 71
End: 2021-02-23

## 2021-02-23 ENCOUNTER — OFFICE VISIT - HEALTHEAST (OUTPATIENT)
Dept: FAMILY MEDICINE | Facility: CLINIC | Age: 71
End: 2021-02-23

## 2021-02-23 DIAGNOSIS — R21 RASH AND NONSPECIFIC SKIN ERUPTION: ICD-10-CM

## 2021-02-26 ENCOUNTER — RECORDS - HEALTHEAST (OUTPATIENT)
Dept: ADMINISTRATIVE | Facility: OTHER | Age: 71
End: 2021-02-26

## 2021-03-01 ENCOUNTER — COMMUNICATION - HEALTHEAST (OUTPATIENT)
Dept: FAMILY MEDICINE | Facility: CLINIC | Age: 71
End: 2021-03-01

## 2021-03-01 DIAGNOSIS — J00 ACUTE RHINITIS: ICD-10-CM

## 2021-03-12 ENCOUNTER — COMMUNICATION - HEALTHEAST (OUTPATIENT)
Dept: GERIATRIC MEDICINE | Facility: CLINIC | Age: 71
End: 2021-03-12

## 2021-03-15 ENCOUNTER — COMMUNICATION - HEALTHEAST (OUTPATIENT)
Dept: FAMILY MEDICINE | Facility: CLINIC | Age: 71
End: 2021-03-15

## 2021-03-15 DIAGNOSIS — F32.9 REACTIVE DEPRESSION: ICD-10-CM

## 2021-03-16 ENCOUNTER — AMBULATORY - HEALTHEAST (OUTPATIENT)
Dept: FAMILY MEDICINE | Facility: CLINIC | Age: 71
End: 2021-03-16

## 2021-03-16 DIAGNOSIS — R32 URINARY INCONTINENCE, UNSPECIFIED TYPE: ICD-10-CM

## 2021-03-18 ENCOUNTER — RECORDS - HEALTHEAST (OUTPATIENT)
Dept: ADMINISTRATIVE | Facility: OTHER | Age: 71
End: 2021-03-18

## 2021-03-20 ENCOUNTER — COMMUNICATION - HEALTHEAST (OUTPATIENT)
Dept: FAMILY MEDICINE | Facility: CLINIC | Age: 71
End: 2021-03-20

## 2021-03-20 DIAGNOSIS — F32.1 CURRENT MODERATE EPISODE OF MAJOR DEPRESSIVE DISORDER, UNSPECIFIED WHETHER RECURRENT (H): ICD-10-CM

## 2021-03-22 ENCOUNTER — COMMUNICATION - HEALTHEAST (OUTPATIENT)
Dept: GERIATRIC MEDICINE | Facility: CLINIC | Age: 71
End: 2021-03-22

## 2021-03-23 ENCOUNTER — COMMUNICATION - HEALTHEAST (OUTPATIENT)
Dept: FAMILY MEDICINE | Facility: CLINIC | Age: 71
End: 2021-03-23

## 2021-03-23 DIAGNOSIS — F32.1 CURRENT MODERATE EPISODE OF MAJOR DEPRESSIVE DISORDER, UNSPECIFIED WHETHER RECURRENT (H): ICD-10-CM

## 2021-03-23 RX ORDER — PAROXETINE 40 MG/1
TABLET, FILM COATED ORAL
Qty: 30 TABLET | Refills: 5 | Status: SHIPPED | OUTPATIENT
Start: 2021-03-23 | End: 2021-09-17

## 2021-03-24 ENCOUNTER — COMMUNICATION - HEALTHEAST (OUTPATIENT)
Dept: FAMILY MEDICINE | Facility: CLINIC | Age: 71
End: 2021-03-24

## 2021-03-24 DIAGNOSIS — L29.9 PRURITUS: ICD-10-CM

## 2021-03-25 RX ORDER — HYDROXYZINE HYDROCHLORIDE 25 MG/1
TABLET, FILM COATED ORAL
Qty: 90 TABLET | Refills: 3 | Status: SHIPPED | OUTPATIENT
Start: 2021-03-25 | End: 2021-12-02

## 2021-04-23 ENCOUNTER — COMMUNICATION - HEALTHEAST (OUTPATIENT)
Dept: FAMILY MEDICINE | Facility: CLINIC | Age: 71
End: 2021-04-23

## 2021-04-23 DIAGNOSIS — K21.9 GASTROESOPHAGEAL REFLUX DISEASE WITHOUT ESOPHAGITIS: ICD-10-CM

## 2021-05-05 ENCOUNTER — COMMUNICATION - HEALTHEAST (OUTPATIENT)
Dept: FAMILY MEDICINE | Facility: CLINIC | Age: 71
End: 2021-05-05

## 2021-05-05 DIAGNOSIS — R05.3 CHRONIC COUGH: ICD-10-CM

## 2021-05-06 RX ORDER — ALBUTEROL SULFATE 90 UG/1
AEROSOL, METERED RESPIRATORY (INHALATION)
Qty: 18 G | Refills: 5 | Status: SHIPPED | OUTPATIENT
Start: 2021-05-06 | End: 2022-12-21

## 2021-05-23 ENCOUNTER — COMMUNICATION - HEALTHEAST (OUTPATIENT)
Dept: FAMILY MEDICINE | Facility: CLINIC | Age: 71
End: 2021-05-23

## 2021-05-23 DIAGNOSIS — L29.9 ITCHING: ICD-10-CM

## 2021-05-24 RX ORDER — LORATADINE 10 MG/1
TABLET ORAL
Qty: 90 TABLET | Refills: 2 | Status: SHIPPED | OUTPATIENT
Start: 2021-05-24 | End: 2021-12-02

## 2021-05-26 ASSESSMENT — PATIENT HEALTH QUESTIONNAIRE - PHQ9
SUM OF ALL RESPONSES TO PHQ QUESTIONS 1-9: 7
SUM OF ALL RESPONSES TO PHQ QUESTIONS 1-9: 3

## 2021-05-27 ASSESSMENT — PATIENT HEALTH QUESTIONNAIRE - PHQ9
SUM OF ALL RESPONSES TO PHQ QUESTIONS 1-9: 12
SUM OF ALL RESPONSES TO PHQ QUESTIONS 1-9: 5

## 2021-05-27 NOTE — TELEPHONE ENCOUNTER
Called to reschedule no show BP check appt with Dr. Lyn on 04/10/2019 had to leave . Please assist in scheduling when patient calls back.

## 2021-05-27 NOTE — PROGRESS NOTES
"ASSESSMENT AND PLAN:  1. Essential hypertension he has stopped lisinopril for a week.  He reports that he feels better he feels more energetic he does not feel tired he does not feel dizzy he is normotensive today.  Recheck blood pressure in 2 weeks we will have him hold the lisinopril until that time.    2. Reactive depression taking fluoxetine sleeping well animated no evidence of depression    3. Primary insomnia sleeps well controlled daughter states that he sleeps throughout the night with no difficulties          CHIEF COMPLAINT:  Chief Complaint   Patient presents with     Follow-up     BP       HISTORY OF PRESENT ILLNESS:  Jj is a 69 y.o. male with hypertension, hyperlipidemia, chronic cough, depression, insomnia, anorexia, and history of stroke with residual dysphagia, who presents to the clinic to follow up on blood pressure. Jj is present with a Yolie . His blood pressure is well-controlled today. His pulse is on the lower end of normal. He denies any dizziness or chest pain though is still having intermittent aches in his chest. The patient states to be doing well overall, and has been able to stay active. He has occasional dry cough when he is exposed to the cold, and uses his albuterol inhaler once daily, sometimes in the morning and sometimes in the evening.    REVIEW OF SYSTEMS:   Respiratory: Occasional dry cough.   CV: No chest pain.   Neuro: No dizziness.  All other systems are negative.    PFSH:  Reviewed as below.     TOBACCO USE:  Social History     Tobacco Use   Smoking Status Former Smoker     Years: 45.00     Last attempt to quit: 4/1/2015     Years since quitting: 3.9   Smokeless Tobacco Former User       VITALS:  Vitals:    03/28/19 1441   BP: 126/70   Pulse: (!) 59   Resp: 12   Temp: 97.9  F (36.6  C)   TempSrc: Oral   SpO2: 94%   Weight: 124 lb 7 oz (56.4 kg)   Height: 5' 0.75\" (1.543 m)     Wt Readings from Last 3 Encounters:   03/28/19 124 lb 7 oz (56.4 kg)   03/20/19 125 lb " (56.7 kg)   11/27/18 122 lb 3 oz (55.4 kg)     Body mass index is 23.71 kg/m .    PHYSICAL EXAM:  General: Alert, cooperative, no distress, appears stated age  HEENT: Normocephalic, without obvious abnormality, atraumatic, moist mucous membranes  Eyes: PERRL, conjunctiva/cornea clear, EOM's intact  Lungs: Clear to auscultation bilaterally, respirations unlabored  Heart: Regular rate and rhythm, S1 and S2 normal, no murmur, rub, or gallop  Neurologic:  A & O x 3.  No tremor, no focal findings.  Normal gait.     DATA REVIEWED:  Additional History from Old Records Summarized (2): none  Decision to Obtain Records (1): none  Radiology Tests Summarized or Ordered (1): none  Labs Reviewed or Ordered (1): Reviewed 03/20/2019 labs.  Medicine Test Summarized or Ordered (1): none  Independent Review of EKG or X-RAY(2 each): none    IMee, am scribing for and in the presence of, Dr. Reyes.    I, Dr. Reyes, personally performed the services described in this documentation, as scribed by Mee Ramirez in my presence, and it is both accurate and complete.      MEDICATIONS:  Current Outpatient Medications   Medication Sig Dispense Refill     albuterol (PROAIR HFA;PROVENTIL HFA;VENTOLIN HFA) 90 mcg/actuation inhaler Inhale 1-2 puffs every 6 (six) hours as needed for wheezing. 1 Inhaler 0     aspirin 81 MG EC tablet TAKE 1 TABLET BY MOUTH DAILY 90 tablet 1     fluticasone (FLONASE) 50 mcg/actuation nasal spray 2 sprays into each nostril daily. 10 g 11     omeprazole (PRILOSEC) 20 MG capsule Take 1 capsule (20 mg total) by mouth daily. 90 capsule 2     PARoxetine (PAXIL) 20 MG tablet TAKE 1 TABLET(20 MG) BY MOUTH EVERY MORNING 90 tablet 2     simvastatin (ZOCOR) 20 MG tablet TAKE 1 TABLET(20 MG) BY MOUTH AT BEDTIME 30 tablet 9     albuterol (PROAIR HFA;PROVENTIL HFA;VENTOLIN HFA) 90 mcg/actuation inhaler INHALE 1 TO 2 PUFFS BY MOUTH EVERY 6 HOURS AS NEEDED FOR WHEEZING 18 g 4     cetirizine (ZYRTEC) 10 MG tablet Take 1 tablet  (10 mg total) by mouth daily. 30 tablet 2     clobetasol (TEMOVATE) 0.05 % ointment Apply topically daily as needed. (Patient not taking: Reported on 6/12/2018) 60 g 3     codeine-guaiFENesin (GUAIFENESIN AC)  mg/5 mL liquid Take 5 mL by mouth 3 (three) times a day as needed for cough. 120 mL 0     lisinopril (PRINIVIL,ZESTRIL) 20 MG tablet Take 1 tablet (20 mg total) by mouth daily. 30 tablet 5     predniSONE (DELTASONE) 10 mg tablet Take 2 tablets (20 mg total) by mouth daily. 15 tablet 0     triamcinolone (NASACORT) 55 mcg nasal inhaler Use spray per nostril daily 1 Inhaler 12     No current facility-administered medications for this visit.        Total Data Points: 1    Please note that this clinical encounter uses voice recognition software, there may be typographical errors present

## 2021-05-28 NOTE — PROGRESS NOTES
[facility] Care Coordination Contact  [facility] Home Visit Assessment     Home visit for Health Risk Assessment with Jj Rios completed on 5/20/2019    Type of residence:: Apartment  Current living arrangement:: I live in a private home with family     Assessment completed with:: Patient, Family    Current Care Plan  Member currently receiving the following home care services:     Member currently receiving the following community resources: PCA, Day Care      Medication Review  Medication reconciliation completed in Epic: YES  Medication set-up & administration: Family/informal caregiver sets up weekly  Family caregiver administers medications  Medication Risk Assessment Medication (1 or more, place referral to MTM):  N/A: No risk factors identified  MTM Referral Placed: No: No risk factors idenified    Mental/Behavioral Health   Depression Screening: See PHQ assessment flowsheet.          Mental Health Diagnosis: Yes: DEPRESSION  Mental Health Services: None: No further intervention needed at this time.    Falls Assessment:   Fallen 2 or more times in the past year?: No        ADL/IADL Dependencies:   Dependent ADLs:: Ambulation-cane, Dressing, Incontinence, Grooming, Toileting  Dependent IADLs:: Cleaning, Cooking, Laundry, Shopping, Meal Preparation, Incontinence, Transportation, Money Management, Medication Management    [facility] Plan sponsored benefits: Shared information re: Silver Sneakers/gym memberships, ASA, Calcium +D.    PCA Assessment completed at visit: Yes    Elderly Waiver Eligibility: Yes - will continue on EW    Care Plan & Recommendations:     See LTCC for detailed assessment information.    Follow-Up Plan: Member informed of future contact, plan to f/u with member with a 6 month telephone assessment.  Contact information shared with member and family, encouraged member to call with any questions or concerns at any time.    [place] care continuum providers: Please refer to  Health Care Home on the [facility] Problem List to view this patient's [facility] Care Plan Summary.    Date of Assessment:  5/20/19  Reason for assessment:  Annual  Rate Cell:  B  Case Mix:  D  Diagnosis #1:  Z86.73  Diagnosis #2:  I10  Next assessment date (if before 11 mo):      Gina Ramírez RN  [facility]  [phone]

## 2021-05-29 NOTE — PROGRESS NOTES
[facility] Care Coordination Contact    [facility]:  Emailed completed PCA assessment to [facility].  Faxed copy of PCA assessment to [facility] and mailed copy to member.  Faxed MD Communication to PCP.     Nicole Parsons  Care Management Specialist  [facility]  [phone]

## 2021-05-29 NOTE — PROGRESS NOTES
"ASSESMENT AND PLAN:  Diagnoses and all orders for this visit:        Essential hypertension  -     Comprehensive Metabolic Panel  -     blood-gluc,BP meter,adult cuff Lou; Check BP daily  Dispense: 1 Device; Refill: 0  We will continue to hold lisinopril for now.  Daughter will check blood pressures at home daily, he will follow-up in 1 month with blood pressure log.    Mixed hyperlipidemia  Continue Zocor.  Recheck lipid today.    Blind left eye  Offered referral to ophthalmology, patient declined.    Chest tightness or pressure  -     Stress Test Graded; Future; Expected date: 06/20/2019    Depression, unspecified depression type  Stable.  No suicidal or homicidal ideations.      This transcription uses voice recognition software, which may contain typographical errors.        SUBJECTIVE: Jj Rios is a 69-year-old male here to establish care.  He is here with his daughter and professional .    He has remote history of stroke, hypertension, hyperlipidemia and depression.  Stroke was about 5 years ago while he was in [facility].  No medical records available to review.  Patient denied residual weaknesses.  He was on lisinopril 20 mg daily for hypertension.  And the medication was held for a few months due to low blood pressure in the office.  Patient reports high blood pressure at home, he has been borrowing his neighbors blood pressure cuff.  States he does not feel well with heavy head, sleepiness and poor appetite when his blood pressure is high.  He was unable to recall the exact numbers but states his blood pressures usually in the 130s when he does not feel well.    He is also requesting\" heart check \".  He is complaining of tightness in his chest and shortness of breath with exertion.  He denied acute chest pain, shortness of breath or palpitations.  He had normal stress test in January 2017.  He had normal echo in June 2015.  No shortness of breath at rest.  No extremity swelling.  No chronic " "cough.    He is on Paxil for depression.  States the medication is working well.  Denied suicidal or homicidal ideations.    Past Medical History:   Diagnosis Date     Anorexia      Cerebral vascular accident (H) 2014     CVA (cerebrovascular accident) (H)      Depression      GERD (gastroesophageal reflux disease)      High cholesterol      HLD (hyperlipidemia)      Hypertension      Insomnia      Patient Active Problem List   Diagnosis     Hypertension     Hyperlipidemia     Cataract     Depression     Rotator cuff syndrome of right shoulder     Insomnia     History of stroke     Blind left eye       Allergies:  No Known Allergies    Social History     Tobacco Use   Smoking Status Former Smoker     Years: 45.00     Last attempt to quit: 2015     Years since quittin.2   Smokeless Tobacco Former User       Review of systems otherwise negative except as listed in HPI.   Social History     Tobacco Use   Smoking Status Former Smoker     Years: 45.00     Last attempt to quit: 2015     Years since quittin.2   Smokeless Tobacco Former User       OBJECTICE: /82   Pulse 79   Temp 97.9  F (36.6  C) (Oral)   Resp 14   Ht 5' 0.12\" (1.527 m)   Wt 124 lb 1.6 oz (56.3 kg)   SpO2 94%   BMI 24.14 kg/m            GEN-alert,  in no apparent distress.  HEENT-mucous membranes are moist . Scarred left eye.   CV-regular rate and rhythm with no murmur.   RESP-lungs clear to auscultation .  ABDOMEN- Soft , not tender.  EXTREM- No edema.  NEURO- Grossly normal.   SKIN-normal        [place] Savanah   2019   "

## 2021-05-29 NOTE — PROGRESS NOTES
[facility] Care Coordination Contact    Received after visit chart from care coordinator.  Completed following tasks: Mailed copy of care plan to client, Updated services in access and Submitted referrals/auths for ADC/ADC transportation      and Provider Signature - No POC Shared:  Member indicates that they do not want their POC shared with any EW providers.      Nicole Parsons  Care Management Specialist  [facility]  [phone]

## 2021-05-30 VITALS — BODY MASS INDEX: 24 KG/M2 | WEIGHT: 126 LBS

## 2021-05-30 VITALS — BODY MASS INDEX: 24.17 KG/M2 | WEIGHT: 128 LBS | HEIGHT: 61 IN

## 2021-05-30 VITALS — WEIGHT: 125 LBS | HEIGHT: 61 IN | BODY MASS INDEX: 23.6 KG/M2

## 2021-05-30 VITALS — BODY MASS INDEX: 24.09 KG/M2 | WEIGHT: 126.44 LBS

## 2021-05-30 VITALS — HEIGHT: 61 IN | WEIGHT: 125 LBS | BODY MASS INDEX: 23.6 KG/M2

## 2021-05-30 VITALS — BODY MASS INDEX: 24.22 KG/M2 | WEIGHT: 124 LBS

## 2021-05-31 VITALS — WEIGHT: 123.56 LBS | BODY MASS INDEX: 23.54 KG/M2

## 2021-05-31 VITALS — BODY MASS INDEX: 22.96 KG/M2 | WEIGHT: 120.5 LBS

## 2021-05-31 VITALS — WEIGHT: 125 LBS | BODY MASS INDEX: 23.81 KG/M2

## 2021-05-31 VITALS — BODY MASS INDEX: 23.25 KG/M2 | WEIGHT: 122.06 LBS

## 2021-05-31 VITALS — WEIGHT: 123.19 LBS | BODY MASS INDEX: 23.47 KG/M2

## 2021-05-31 VITALS — WEIGHT: 123.13 LBS | BODY MASS INDEX: 23.46 KG/M2

## 2021-06-01 VITALS — BODY MASS INDEX: 23.03 KG/M2 | HEIGHT: 61 IN | WEIGHT: 122 LBS

## 2021-06-01 VITALS — WEIGHT: 125.06 LBS | HEIGHT: 61 IN | BODY MASS INDEX: 23.61 KG/M2

## 2021-06-01 VITALS — BODY MASS INDEX: 23.48 KG/M2 | WEIGHT: 123.25 LBS

## 2021-06-01 VITALS — BODY MASS INDEX: 22.68 KG/M2 | WEIGHT: 120.13 LBS | HEIGHT: 61 IN

## 2021-06-02 VITALS — HEIGHT: 61 IN | WEIGHT: 122.19 LBS | BODY MASS INDEX: 23.07 KG/M2

## 2021-06-02 VITALS — WEIGHT: 124.44 LBS | HEIGHT: 61 IN | BODY MASS INDEX: 23.49 KG/M2

## 2021-06-02 VITALS — BODY MASS INDEX: 23.6 KG/M2 | HEIGHT: 61 IN | WEIGHT: 125 LBS

## 2021-06-02 NOTE — TELEPHONE ENCOUNTER
Refill Approved    Rx renewed per Medication Renewal Policy. Medication was last renewed on 2/13/19.10/4/18    Sepideh Allen, Care Connection Triage/Med Refill 10/9/2019     Requested Prescriptions   Pending Prescriptions Disp Refills     simvastatin (ZOCOR) 20 MG tablet [Pharmacy Med Name: SIMVASTATIN 20MG TABLETS] 30 tablet 0     Sig: TAKE 1 TABLET(20 MG) BY MOUTH AT BEDTIME       Statins Refill Protocol (Hmg CoA Reductase Inhibitors) Passed - 10/7/2019  2:35 PM        Passed - PCP or prescribing provider visit in past 12 months      Last office visit with prescriber/PCP: 3/28/2019 Reji Reyes MD OR same dept: 7/18/2019 Donald Pavon MD OR same specialty: 7/18/2019 Donald Pavon MD  Last physical: Visit date not found Last MTM visit: Visit date not found   Next visit within 3 mo: Visit date not found  Next physical within 3 mo: Visit date not found  Prescriber OR PCP: Reji Reyes MD  Last diagnosis associated with med order: 1. Pure hyperglyceridemia  - simvastatin (ZOCOR) 20 MG tablet [Pharmacy Med Name: SIMVASTATIN 20MG TABLETS]; TAKE 1 TABLET(20 MG) BY MOUTH AT BEDTIME  Dispense: 30 tablet; Refill: 0    2. CVA (cerebral vascular accident) (H)  - aspirin 81 MG EC tablet [Pharmacy Med Name: ASPIRIN 81MG EC LOW DOSE TABLETS]; TAKE 1 TABLET BY MOUTH DAILY  Dispense: 90 tablet; Refill: 0    If protocol passes may refill for 12 months if within 3 months of last provider visit (or a total of 15 months).             aspirin 81 MG EC tablet [Pharmacy Med Name: ASPIRIN 81MG EC LOW DOSE TABLETS] 90 tablet 0     Sig: TAKE 1 TABLET BY MOUTH DAILY       Aspirin/Dipyridamole Refill Protocol Passed - 10/7/2019  2:35 PM        Passed - PCP or prescribing provider visit in past 12 months       Last office visit with prescriber/PCP: 3/28/2019 Reji Reyes MD OR same dept: 7/18/2019 Donald Pavon MD OR same specialty: 7/18/2019 Donald Pavon MD  Last physical: Visit date not found Last MTM visit: Visit date not found    Next appt within 3  mo: Visit date not found Next physical within 3 mo: Visit date not found  Prescriber OR PCP: Reji Reyes MD  Last diagnosis associated with med order: 1. Pure hyperglyceridemia  - simvastatin (ZOCOR) 20 MG tablet [Pharmacy Med Name: SIMVASTATIN 20MG TABLETS]; TAKE 1 TABLET(20 MG) BY MOUTH AT BEDTIME  Dispense: 30 tablet; Refill: 0    2. CVA (cerebral vascular accident) (H)  - aspirin 81 MG EC tablet [Pharmacy Med Name: ASPIRIN 81MG EC LOW DOSE TABLETS]; TAKE 1 TABLET BY MOUTH DAILY  Dispense: 90 tablet; Refill: 0    If protocol passes may refill for 6 months if within 3 months of last provider visit (or a total of 9 months).

## 2021-06-03 VITALS — WEIGHT: 121.56 LBS | BODY MASS INDEX: 23.87 KG/M2 | HEIGHT: 60 IN

## 2021-06-03 VITALS — WEIGHT: 124.1 LBS | BODY MASS INDEX: 24.36 KG/M2 | HEIGHT: 60 IN

## 2021-06-03 NOTE — PROGRESS NOTES
[facility] Care Coordination Contact      [facility] Six-Month Telephone Assessment    6 month telephone assessment completed on 11/14/19..    ER visits: No  Hospitalizations: No  TCU stays: No  Significant health status changes: none  Falls/Injuries: No  ADL/IADL changes: No  Changes in services: No    Caregiver Assessment follow up:  N/A    Goals: See POC in chart for goal progress documentation.  No changes in goals.    Will see member in 6 months for an annual health risk assessment.   Encouraged member to call CC with any questions or concerns in the meantime.     Gina Ramírez RN  [facility]  [phone]

## 2021-06-04 VITALS
OXYGEN SATURATION: 95 % | HEART RATE: 66 BPM | DIASTOLIC BLOOD PRESSURE: 88 MMHG | SYSTOLIC BLOOD PRESSURE: 136 MMHG | WEIGHT: 121.56 LBS | HEIGHT: 60 IN | BODY MASS INDEX: 23.87 KG/M2 | TEMPERATURE: 98.2 F

## 2021-06-04 NOTE — TELEPHONE ENCOUNTER
Please call patient's pharmacy to discontinue lisinopril 20 mg daily.  Thank you,    Dr. Donald Pavon  12/20/2019 12:54 PM

## 2021-06-04 NOTE — TELEPHONE ENCOUNTER
Refill Approved    Rx renewed per Medication Renewal Policy. Medication was last renewed on 1/11/19  OV 12/20/19.    Keyla Blackwell, [facility] Connection Triage/Med Refill 12/24/2019     Requested Prescriptions   Pending Prescriptions Disp Refills     omeprazole (PRILOSEC) 20 MG capsule [Pharmacy Med Name: OMEPRAZOLE 20MG CAPSULES] 90 capsule 2     Sig: TAKE 1 CAPSULE(20 MG) BY MOUTH DAILY       GI Medications Refill Protocol Passed - 12/22/2019 11:02 AM        Passed - PCP or prescribing provider visit in last 12 or next 3 months.     Last office visit with prescriber/PCP: 3/28/2019 Reji Reyes MD OR same dept: 7/18/2019 Donald Pavon MD OR same specialty: 7/18/2019 Donald Pavon MD  Last physical: Visit date not found Last MTM visit: Visit date not found   Next visit within 3 mo: Visit date not found  Next physical within 3 mo: Visit date not found  Prescriber OR PCP: Reji Reyes MD  Last diagnosis associated with med order: 1. Gastroesophageal reflux disease without esophagitis  - omeprazole (PRILOSEC) 20 MG capsule [Pharmacy Med Name: OMEPRAZOLE 20MG CAPSULES]; TAKE 1 CAPSULE(20 MG) BY MOUTH DAILY  Dispense: 90 capsule; Refill: 2    If protocol passes may refill for 12 months if within 3 months of last provider visit (or a total of 15 months).

## 2021-06-04 NOTE — TELEPHONE ENCOUNTER
Unable to reach pharmacy. Pharmacy error message states that pharmacy is experiencing technical difficulties.

## 2021-06-04 NOTE — PROGRESS NOTES
Assessment and Plan:   1. Wellness examination    2. Current moderate episode of major depressive disorder, unspecified whether recurrent (H)  Continue Paxil.    3. Mixed hyperlipidemia  .Medication if indicated.  - Lipid Cascade RANDOM    4. Essential hypertension  BP Readings from Last 3 Encounters:   12/20/19 136/88   07/18/19 104/70   06/20/19 128/82   Blood pressure has been normal without lisinopril.    Will discontinue lisinopril.    5. Memory problem  Will need referral for neuropsych evaluation and citizenship waiver.    6. Immunization due  - Influenza High Dose, Seasonal 65+ yrs      The patient's current medical problems were reviewed.    I have had an Advance Directives discussion with the patient.  The following health maintenance schedule was reviewed with the patient and provided in printed form in the after visit summary:   Health Maintenance   Topic Date Due     DEPRESSION ACTION PLAN  1950     ADVANCE CARE PLANNING  01/01/1967     MEDICARE ANNUAL WELLNESS VISIT  07/16/2016     ZOSTER VACCINES (1 of 2) 08/17/2016     INFLUENZA VACCINE RULE BASED (1) 08/01/2019     FALL RISK ASSESSMENT  11/27/2019     LIPID  10/11/2022     TD 18+ HE  06/22/2026     COLONOSCOPY  09/26/2027     HEPATITIS C SCREENING  Completed     PNEUMOCOCCAL IMMUNIZATION 65+ LOW/MEDIUM RISK  Completed        Subjective:   Chief Complaint: Jj Rios is an 69 y.o. male here for an Annual Wellness visit.   HPI: Patient is here for physical.  He has history of depression, hypertension (currently not taking medication ), blindness in the left eye and memory problems.  He does not see ophthalmology.  No new concerns or complaints since last visit.  Blood pressures been normal without medication.  Depression symptoms are stable, denied suicidal homicidal ideations.  No acute fever or URI symptoms.  No acute chest pain, shortness of breath or palpitations.    Review of Systems:  Please see above.  The rest of the review of systems  are negative for all systems.    Patient Care Team:  Donald Pavon MD as PCP - General (Family Medicine)  Reji Reyes MD as Assigned PCP  Gina Ramírez RN as Lead Care Coordinator (Primary Care - CC)     Patient Active Problem List   Diagnosis     Hypertension     Hyperlipidemia     Cataract     Depression     Rotator cuff syndrome of right shoulder     Insomnia     History of stroke     Blind left eye     Past Medical History:   Diagnosis Date     Anorexia      Cerebral vascular accident (H) 2014     CVA (cerebrovascular accident) (H)      Depression      GERD (gastroesophageal reflux disease)      High cholesterol      HLD (hyperlipidemia)      Hypertension      Insomnia       No past surgical history on file.   No family history on file.   Social History     Socioeconomic History     Marital status:      Spouse name: Not on file     Number of children: Not on file     Years of education: Not on file     Highest education level: Not on file   Occupational History     Not on file   Social Needs     Financial resource strain: Not on file     Food insecurity:     Worry: Not on file     Inability: Not on file     Transportation needs:     Medical: Not on file     Non-medical: Not on file   Tobacco Use     Smoking status: Former Smoker     Years: 45.00     Last attempt to quit: 2015     Years since quittin.7     Smokeless tobacco: Former User   Substance and Sexual Activity     Alcohol use: No     Drug use: No     Sexual activity: Not on file   Lifestyle     Physical activity:     Days per week: Not on file     Minutes per session: Not on file     Stress: Not on file   Relationships     Social connections:     Talks on phone: Not on file     Gets together: Not on file     Attends Muslim service: Not on file     Active member of club or organization: Not on file     Attends meetings of clubs or organizations: Not on file     Relationship status: Not on file     Intimate partner violence:     Fear of  "current or ex partner: Not on file     Emotionally abused: Not on file     Physically abused: Not on file     Forced sexual activity: Not on file   Other Topics Concern     Not on file   Social History Narrative     Not on file      Current Outpatient Medications   Medication Sig Dispense Refill     albuterol (PROAIR HFA;PROVENTIL HFA;VENTOLIN HFA) 90 mcg/actuation inhaler INHALE 1 TO 2 PUFFS BY MOUTH EVERY 6 HOURS AS NEEDED FOR WHEEZING 18 g 4     aspirin 81 MG EC tablet TAKE 1 TABLET BY MOUTH DAILY 90 tablet 1     blood-gluc,BP meter,adult cuff Lou Check BP daily 1 Device 0     fluticasone (FLONASE) 50 mcg/actuation nasal spray 2 sprays into each nostril daily. 10 g 11     omeprazole (PRILOSEC) 20 MG capsule Take 1 capsule (20 mg total) by mouth daily. 90 capsule 2     PARoxetine (PAXIL) 20 MG tablet TAKE 1 TABLET(20 MG) BY MOUTH EVERY MORNING 90 tablet 2     simvastatin (ZOCOR) 20 MG tablet TAKE 1 TABLET(20 MG) BY MOUTH AT BEDTIME 90 tablet 2     lisinopril (PRINIVIL,ZESTRIL) 20 MG tablet Take 1 tablet (20 mg total) by mouth daily. 30 tablet 5     No current facility-administered medications for this visit.       Objective:   Vital Signs:   Visit Vitals  /88 (Patient Site: Left Arm, Patient Position: Sitting, Cuff Size: Adult Regular)   Pulse 66   Temp 98.2  F (36.8  C) (Oral)   Ht 5' 0.24\" (1.53 m)   Wt 121 lb 9 oz (55.1 kg)   SpO2 95%   BMI 23.56 kg/m           PHYSICAL EXAM  Gen - alert, orientated, NAD  Eyes - history of traumaticleft eye injury, scar noted.  Right side is normal.  ENT - oropharynx clear, TMs clear  Neck - supple, no palpable mass or lymphadenopathy  CV - RRR, no murmur  Resp - lungs CTA  Ab - soft, nontender, no palpable mass or organomegaly   - normal appearance to the external genetalia, normal testicular exam bilaterally, no hernia  Extrem - warm, no edema  Neuro - CN II-XII intact, strength, sensation, reflexes intact and symmetric  Skin - no rash, no atypical appearing lesions " seen.     Assessment Results 12/20/2019   Activities of Daily Living 5-6 - Severe functional impairment   Instrumental Activities of Daily Living 5-6 - Severe functional impairment   Mini Cog Total Score 3   Some recent data might be hidden     A Mini-Cog score of 0-2 suggests the possibility of dementia, score of 3-5 suggests no dementia    Identified Health Risks:     The patient was provided with suggestions to help him develop a healthy physical lifestyle.   He is at risk for lack of exercise and has been provided with information to increase physical activity for the benefit of his well-being.  The patient reports that he has difficulty with activities of daily living.  Family members are helping the patient.  The patient reports that he has difficulty with instrumental activities of daily living.  He was provided with a list of local organizations that provide support services  The patient's PHQ-9 score is consistent with mild depression.  Advised to continue his current medication.  He is at risk for falling and has been provided with information to reduce the risk of falling at home.  Information regarding advance directives (living carbone), including where he can download the appropriate form, was provided to the patient via the AVS.

## 2021-06-05 VITALS
WEIGHT: 119.06 LBS | BODY MASS INDEX: 23.37 KG/M2 | TEMPERATURE: 98.3 F | HEIGHT: 60 IN | RESPIRATION RATE: 16 BRPM | DIASTOLIC BLOOD PRESSURE: 58 MMHG | HEART RATE: 64 BPM | SYSTOLIC BLOOD PRESSURE: 96 MMHG | OXYGEN SATURATION: 96 %

## 2021-06-05 VITALS
SYSTOLIC BLOOD PRESSURE: 122 MMHG | WEIGHT: 120.13 LBS | DIASTOLIC BLOOD PRESSURE: 74 MMHG | OXYGEN SATURATION: 95 % | BODY MASS INDEX: 23.58 KG/M2 | HEIGHT: 60 IN | RESPIRATION RATE: 16 BRPM | TEMPERATURE: 98.2 F | HEART RATE: 66 BPM

## 2021-06-05 VITALS
OXYGEN SATURATION: 92 % | DIASTOLIC BLOOD PRESSURE: 82 MMHG | WEIGHT: 119.31 LBS | SYSTOLIC BLOOD PRESSURE: 120 MMHG | RESPIRATION RATE: 16 BRPM | HEART RATE: 92 BPM | BODY MASS INDEX: 23.42 KG/M2 | HEIGHT: 60 IN | TEMPERATURE: 97 F

## 2021-06-05 NOTE — TELEPHONE ENCOUNTER
Refill Approved    Rx renewed per Medication Renewal Policy. Medication was last renewed on 6/13/18  OV 12/20/19.    Keyla Blackwell, [facility] Connection Triage/Med Refill 2/7/2020     Requested Prescriptions   Pending Prescriptions Disp Refills     fluticasone propionate (FLONASE) 50 mcg/actuation nasal spray [Pharmacy Med Name: FLUTICASONE 50MCG NASAL SP (120) RX] 16 g 0     Sig: SHAKE LIQUID AND USE 2 SPRAYS IN EACH NOSTRIL DAILY       Nasal Steroid Refill Protocol Passed - 2/7/2020  3:28 PM        Passed - Patient has had office visit/physical in last 2 years     Last office visit with prescriber/PCP: 3/28/2019 OR same dept: 7/18/2019 Donald Pavon MD OR same specialty: 7/18/2019 Donald Pavon MD Last physical: Visit date not found Last MTM visit: Visit date not found    Next appt within 3 mo: Visit date not found  Next physical within 3 mo: Visit date not found  Prescriber OR PCP: Reji Reyes MD  Last diagnosis associated with med order: There are no diagnoses linked to this encounter.   If protocol passes may refill for 12 months if within 3 months of last provider visit (or a total of 15 months).

## 2021-06-05 NOTE — TELEPHONE ENCOUNTER
Refill Approved    Rx renewed per Medication Renewal Policy. Medication was last renewed on 2/13/19.    Sepideh Allen, Care Connection Triage/Med Refill 2/5/2020     Requested Prescriptions   Pending Prescriptions Disp Refills     PARoxetine (PAXIL) 20 MG tablet [Pharmacy Med Name: PAROXETINE 20MG TABLETS] 90 tablet 2     Sig: TAKE 1 TABLET(20 MG) BY MOUTH EVERY MORNING       SS[facility] Refill Protocol  Passed - 2/5/2020  3:37 PM        Passed - PCP or prescribing provider visit in last year     Last office visit with prescriber/PCP: 3/28/2019 Reji Reyes MD OR same dept: 7/18/2019 Donald Pavon MD OR same specialty: 7/18/2019 Donald Pavon MD  Last physical: Visit date not found Last MTM visit: Visit date not found   Next visit within 3 mo: Visit date not found  Next physical within 3 mo: Visit date not found  Prescriber OR PCP: Reji Reyes MD  Last diagnosis associated with med order: 1. Reactive depression  - PARoxetine (PAXIL) 20 MG tablet [Pharmacy Med Name: PAROXETINE 20MG TABLETS]; TAKE 1 TABLET(20 MG) BY MOUTH EVERY MORNING  Dispense: 90 tablet; Refill: 2    If protocol passes may refill for 12 months if within 3 months of last provider visit (or a total of 15 months).

## 2021-06-07 NOTE — PROGRESS NOTES
[facility] Care Coordination Contact  [facility] Home Visit Assessment     Phone assessment completed instead of home visit due to covid-19 restrictions; Health Risk Assessment with Jj Rios completed on 4/3/2020 via phone call.    Type of residence:: Apartment  Current living arrangement:: I live in a private home with family     Assessment completed with:: Patient, Children,     Current Care Plan  Member currently receiving the following home care services:     Member currently receiving the following community resources: PCA, Day Care    Medication Review  Medication reconciliation completed in Epic: IF NO, PLEASE EXPLAIN due to phone assessment and inability to view medications.  Medication set-up & administration: Family/informal caregiver sets up daily  Family caregiver administers medications  Medication Risk Assessment Medication (1 or more, place referral to MTM):  N/A: No risk factors identified  MTM Referral Placed: No: No risk factors idenified    Mental/Behavioral Health   Depression Screening: See PHQ assessment flowsheet.   Mental health DX:: Yes(Depression)   Mental health DX how managed:: Medication  Mental Health Diagnosis: Yes: depression  Mental Health Services: None: No further intervention needed at this time.    Falls Assessment:   Fallen 2 or more times in the past year?: No   Any fall with injury in the past year?: No    ADL/IADL Dependencies:   Dependent ADLs:: Ambulation-cane, Bathing, Dressing, Eating, Grooming, Incontinence, Transfers, Toileting  Dependent IADLs:: Cleaning, Cooking, Laundry, Shopping, Meal Preparation, Medication Management, Money Management, Transportation, Incontinence    [facility] sponsored benefits: Shared information re: Silver Sneakers/gym memberships, ASA, Calcium +D.    PCA Assessment completed at visit: Yes    Elderly Waiver Eligibility: Yes - will continue on EW    Care Plan & Recommendations: continue to exercise daily to  keep strength up.    See [facility] for detailed assessment information.    Follow-Up Plan: Member informed of future contact, plan to f/u with member with a 6 month telephone assessment.  Contact information shared with member and family, encouraged member to call with any questions or concerns at any time.    Gina Ramírez RN  [facility]  [phone]

## 2021-06-07 NOTE — PROGRESS NOTES
[facility] Care Coordination Contact    Received after visit chart from care coordinator.  Completed following tasks: Mailed copy of care plan to client, Updated services in access and Submitted referrals/auths for adult day care  Chart was returned to CC.    and Provider Signature - No POC Shared:  Member indicates that they do not want their POC shared with any EW providers.     [facility]:  Emailed completed PCA assessment to [facility].  Faxed copy of PCA assessment to PCA Agency and mailed copy to member.  Faxed MD Communication to PCP.       Mailed POC signature page, PCA signature page, and  JIMMIE signature page to member to sign and return asap.    Nicole Parsons  Care Management Specialist  [facility]  [phone]

## 2021-06-07 NOTE — PROGRESS NOTES
Clinic Care Coordination Contact     Pt was a no show for initial [facility] SW phone assessment x1. Left VM message indicating that this visit has been rescheduled for Friday, 4/17/20 at 9am.

## 2021-06-07 NOTE — PROGRESS NOTES
The Clinic Community Health Worker spoke with the patient today to discuss possible Clinic Care Coordination enrollment.  The service was described to the patient and immediate needs were discussed.  The patient agreed to enrollment and an assessment was scheduled.  The patient was provided with contact information for the clinic CHW.             SW Phone Assessment date: Wednesday 4/15 at 9am     - Already enrolled in Care Coordination through [facility].     - Dr. Pavon referred patient to [facility] for a Citizenship Waiver assessment, this will be scheduled out to May/Maryjane possibly due to COVID-19.     - Patient will need resources of where to bring their Citizenship Waiver and apply for citizenship such as the [employer] [place].     - Possibly could benefit from an [facility] worker throughout the process of applying for citizenship unless family members are able to assist with the process.

## 2021-06-07 NOTE — PROGRESS NOTES
"Jj Rios is a 70 y.o. male who is being evaluated via a billable telephone visit.      The patient has been notified of following:     \"This telephone visit will be conducted via a call between you and your physician/provider. We have found that certain health care needs can be provided without the need for a physical exam.  This service lets us provide the care you need with a short phone conversation.  If a prescription is necessary we can send it directly to your pharmacy.  If lab work is needed we can place an order for that and you can then stop by our lab to have the test done at a later time\"      Patient has given verbal consent to a Telephone visit? Yes    Jj Rios complains of    Chief Complaint   Patient presents with     Hypertension       I have reviewed and updated the patient's Past Medical History, Social History, Family History and Medication List.    ALLERGIES  Patient has no known allergies.    Additional provider notes: Patient is home with his daughter.  Spoke to patient and daughter both.  Patient has no health concerns.  Speaking in full sentences.  Not appear to be in acute distress.  Taking medication daily.  Daughter took his blood pressure while on the phone and states 128/90.  He is taking all his medications as prescribed.  He needs albuterol refill to use as needed for shortness of breath.  No acute shortness of breath today.  No fever, cough or URI symptoms.  No known sick contact.    Family and patient's main concern is citizenship application and waiver.  They are under the impression that PCP needs to sign his citizenship application.    Assessment/Plan:  1. Essential hypertension      2. Mixed hyperlipidemia  Continue current medication.    3. Memory problem  Informed the patient and daughter that PCP does not need to get involved for citizenship application.  But I will refer him to psychology for neuropsych testing and citizenship waiver.  Referred to [facility] team if they can " help with application.  - Ambulatory referral to Care Management (Primary Care)  - Ambulatory referral to Psychiatry    4. Pure hyperglyceridemia  - simvastatin (ZOCOR) 20 MG tablet; TAKE 1 TABLET(20 MG) BY MOUTH AT BEDTIME  Dispense: 90 tablet; Refill: 2    5. Reactive depression  - PARoxetine (PAXIL) 20 MG tablet; TAKE 1 TABLET(20 MG) BY MOUTH EVERY MORNING  Dispense: 90 tablet; Refill: 1  Stable, No suicidal or homicidal ideations.    6. Gastroesophageal reflux disease without esophagitis  - omeprazole (PRILOSEC) 20 MG capsule; TAKE 1 CAPSULE(20 MG) BY MOUTH DAILY  Dispense: 90 capsule; Refill: 3    7.  Shortness of breath  No acute shortness of breath today.  Prescribed albuterol to use as needed.  albuterol (PROAIR HFA;PROVENTIL HFA;VENTOLIN HFA) 90 mcg/actuation inhaler; INHALE 1 TO 2 PUFFS BY MOUTH EVERY 6 HOURS AS NEEDED FOR WHEEZING  Dispense: 18 g; Refill: 4      Phone call duration:  22  minutes    Donald Pavon MD

## 2021-06-08 NOTE — TELEPHONE ENCOUNTER
Refill Approved    Rx renewed per Medication Renewal Policy. Medication was last renewed on 10/9/19.    Sepideh Allen, Care Connection Triage/Med Refill 6/15/2020     Requested Prescriptions   Pending Prescriptions Disp Refills     aspirin 81 MG EC tablet [Pharmacy Med Name: ASPIRIN 81MG EC LOW DOSE  TABLETS] 90 tablet 1     Sig: TAKE 1 TABLET BY MOUTH DAILY       Aspirin/Dipyridamole Refill Protocol Passed - 6/13/2020  9:22 AM        Passed - PCP or prescribing provider visit in past 12 months       Last office visit with prescriber/PCP: 3/28/2019 Reji Reyes MD OR same dept: 7/18/2019 Donald Pavon MD OR same specialty: 7/18/2019 Donald Pavon MD  Last physical: Visit date not found Last MTM visit: Visit date not found    Next appt within 3 mo: Visit date not found Next physical within 3 mo: Visit date not found  Prescriber OR PCP: Reji Reyes MD  Last diagnosis associated with med order: 1. CVA (cerebral vascular accident) (H)  - aspirin 81 MG EC tablet [Pharmacy Med Name: ASPIRIN 81MG EC LOW DOSE  TABLETS]; TAKE 1 TABLET BY MOUTH DAILY  Dispense: 90 tablet; Refill: 1    If protocol passes may refill for 6 months if within 3 months of last provider visit (or a total of 9 months).

## 2021-06-08 NOTE — PROGRESS NOTES
Subjective:   Jj Rios presents with his daughter and an  today.  Apparently his daughter increase his lisinopril to 2 tablets twice a day from 1 tablet a day because the patient was complaining of some tiredness and heaviness in his body.  He recently had a cardiac treadmill test today.  They do not know the results of that.  He was seen in the emergency room due to some chest pain.  Cardiac disease was ruled out apparently.  Yesterday went to the pharmacy to get a refill on the lisinopril but they would not refill it there as they were giving 4 times his prescribed dose.  Now he is not been on medicines for 2 days.  He denies headaches.  Denies chest pains.  He denies palpitations.      Objective:  HEENT: Pupils equal round and reactive to light.  Fundi appear benign.  Pharynx is clear.  Neck: Neck reveals no increased JVD.  Cardiac: There is a regular rhythm present.  No murmur heard.  No ectopy present.  Blood pressure is 100 systolic today.  Extremities: No edema noted today.      Assessment:  1.  Hypertension in good control.  2.  Medication noncompliance      Plan:  I instructed the patient and his daughter through the  that taking medications in a different dose that is prescribed can be very dangerous to do.  I also informed them that if the patient would get any symptoms that we should see him in clinic to assess what may be causing the symptom and not assume it is just a blood pressure problem.  We will resume lisinopril at 10 mg daily.  No changes in any other medications.  Follow up here within 1 month to recheck blood pressure while on 10 mg of lisinopril.  He'll follow-up sooner if any new symptoms arise.  Greater than 25 minutes was spent with the patient and his daughter today.  Greater than 50% of time was spent counseling them on appropriate medication use.  All questions were answered through the professional  today.

## 2021-06-08 NOTE — PROGRESS NOTES
Clinic Care Coordination Contact    Community Health Worker Follow Up    Goals:     Goals Addressed                 This Visit's Progress       Patient Stated      COMPLETED: Mental Health Management 1 (pt-stated)   On track     Goal statement: I would like to be assigned an ARM[facility] worker within the next 60 days.    Date goal set: 4/17/20  Barriers: Language barrier, poor historian, history of stroke, memory problems  Strengths: Has support of immediate family, receiving PCA services, appears willing to accept support  Date to achieve by: 6/17/20  Patient expressed understanding of goal: Yes    Personal Plan:  1. I have been assigned and ARM[facility] worker Nusrat through [facility], [phone].          CHW Next Follow-up: 6/18/2020    CHW Plan: Continue outreach    Jj has been assigned and ARM[facility] worker Nusrat, and this person has been added to the Care Team.    No new concerns and other goal is on hold due to COVID-19.

## 2021-06-08 NOTE — PROGRESS NOTES
ASSESSMENT AND PLAN:  1. Hypertension blood pressures well controlled on current medication continue lisinopril at 10 mg daily     2. Dysphagia as late effect of stroke continuing discomfort with heavy foods continue current diet refilling omeprazole  omeprazole (PRILOSEC) 20 MG capsule   3. Major depressive disorder with single episode, in partial remission is active takes Paxil side effects noted no sadness noted     4. Eczema, dyshidrotic eczema has not responded clobetasol again repeated instructions follow-up in 3 weeks advised not to itchy area     5. Dermatitis  clobetasol (TEMOVATE) 0.05 % ointment       CHIEF COMPLAINT:  Chief Complaint   Patient presents with     Follow-up     HTN check     other     IVD not meeting goal       HISTORY OF PRESENT ILLNESS:  Jj is a 67 y.o. male presenting for a follow-up of hypertension . Jj is present with a Yolie  and daughter. He has been taking 20 mg lisinopril faithfully. His current blood pressure is 102/62.     Dysphagia: He states that he still has issues swallowing and chokes intermittently.     Gastritis: He states that his abdominal pain has improved but he still does not eat spicy foods. He will feel abdominal aches when he drinks coffee.     Eczema: Daughter states that he is having scaling and flaking over his hands bilaterally.     REVIEW OF SYSTEMS:   He states that will have intermittent insomnia.   All other 10 point review of systems are negative.    PFSH:  He enjoys watching TV. He goes to [employer] and shops. Reviewed as below.     TOBACCO USE:  History   Smoking Status     Former Smoker     Years: 45.00     Quit date: 4/1/2015   Smokeless Tobacco     Former User       VITALS:  Vitals:    01/13/17 0952   BP: 102/62   Patient Site: Left Arm   Patient Position: Sitting   Cuff Size: Adult Regular   Pulse: 72   Resp: 20   Temp: 97.8  F (36.6  C)   TempSrc: Oral   Weight: 124 lb (56.2 kg)     Wt Readings from Last 3 Encounters:   01/13/17 124 lb (56.2  kg)   12/14/16 125 lb 8 oz (56.9 kg)   09/14/16 124 lb (56.2 kg)     Body mass index is 24.22 kg/(m^2).    PHYSICAL EXAM:  General: Alert, cooperative, no distress, appears stated age  Lungs: Clear to auscultation bilaterally, respirations unlabored  Chest wall: No tenderness or deformity  Heart: Regular rate and rhythm, S1 and S2 normal, no murmur, rub, or gallop  Skin: Scaly and erythematous areas present over hands bilaterally  Neurologic:  A & O x 3.  No tremor, no focal findings.      DATA REVIEWED:  Additional History from Old Records Summarized (2): None  Decision to Obtain Records (1): None  Radiology Tests Summarized or Ordered (1): None  Labs Reviewed or Ordered (1): None  Medicine Test Summarized or Ordered (1): None  Independent Review of EKG or X-RAY(2 each): None    The visit lasted a total of 13 minutes face to face with the patient. Over 50% of the time was spent counseling and educating the patient about hypertension.     ISylvia, am scribing for and in the presence of, Dr. Reyes.    IDr. Reyes, personally performed the services described in this documentation, as scribed by Sylvia Friedman in my presence, and it is both accurate and complete.      MEDICATIONS:  Current Outpatient Prescriptions   Medication Sig Dispense Refill     lisinopril (PRINIVIL,ZESTRIL) 20 MG tablet TAKE 1 TABLET (10 MG TOTAL) BY MOUTH DAILY. 30 tablet 1     omeprazole (PRILOSEC) 20 MG capsule TAKE ONE CAPSULE BY MOUTH EVERY DAY 90 capsule 2     PARoxetine (PAXIL) 20 MG tablet Take 1 tablet (20 mg total) by mouth every morning. 30 tablet 11     simvastatin (ZOCOR) 20 MG tablet Take 1 tablet (20 mg total) by mouth bedtime. 30 tablet 9     ammonium lactate (LAC-HYDRIN) 12 % lotion Apply topically 2 (two) times a day. 400 g 4     aspirin 81 MG EC tablet TAKE 1 TABLET (81 MG TOTAL) BY MOUTH DAILY. 90 tablet 3     cetirizine (ZYRTEC) 10 MG tablet Take 1 tablet (10 mg total) by mouth daily. 30 tablet 2      clobetasol (TEMOVATE) 0.05 % ointment Apply topically daily as needed. 60 g 3     No current facility-administered medications for this visit.        Total Data Points: 0

## 2021-06-08 NOTE — PROGRESS NOTES
"Jj Rios is a 70 y.o. male who is being evaluated via a billable telephone visit.      The patient has been notified of following:     \"This telephone visit will be conducted via a call between you and your physician/provider. We have found that certain health care needs can be provided without the need for a physical exam.  This service lets us provide the care you need with a short phone conversation.  If a prescription is necessary we can send it directly to your pharmacy.  If lab work is needed we can place an order for that and you can then stop by our lab to have the test done at a later time.    Telephone visits are billed at different rates depending on your insurance coverage. During this emergency period, for some insurers they may be billed the same as an in-person visit.  Please reach out to your insurance provider with any questions.    If during the course of the call the physician/provider feels a telephone visit is not appropriate, you will not be charged for this service.\"    Patient has given verbal consent to a Telephone visit? Yes    What phone number would you like to be contacted at? [phone]        Additional provider notes: Patient has history of hypertension, hyperlipidemia and depression.  Taking Zocor and Paxil.  No medication for hypertension.  States he is doing well other than occasional aches and pain.  He reports\" heavy heart and sad \" at times.  He thinks Paxil is helping.  No suicidal homicidal ideations.  No known exposure to COVID-19.  No COVID-19 symptoms.  No sick family members.    Assessment/Plan:  1. Essential hypertension  Continue current medication    2. Current moderate episode of major depressive disorder, unspecified whether recurrent (H)  Increase Paxil to 40 mg daily.  No suicidal or homicidal ideation.  - PHQ9 Depression Screen  - PARoxetine (PAXIL) 40 MG tablet; TAKE 1 TABLET(20 MG) BY MOUTH EVERY MORNING  Dispense: 30 tablet; Refill: 5  Referred to " psychology for neuropsych testing/ citizenship waiver.  Message sent to staff to follow-up on appointment time and date.    3. Mixed hyperlipidemia  Continue current medication.      Phone call duration:  16  minutes    Dr. Donald Pavon  6/4/2020 11:59 AM  This transcription uses voice recognition software, which may contain typographical errors.

## 2021-06-08 NOTE — PROGRESS NOTES
ASSESSMENT AND PLAN:  1. Hypertension currently is normotensive notes from Dr. messina bladder is only giving him 1 tablet of lisinopril Valium 2 tablets left the prescription is due on the 18th that cautioned her against overdosing her father she seems to think that his blood pressures too high reviewed her blood pressure is certainly made her understand that he should not be hypotensive and this caused him to feel ill he is actually feeling quite well today     2. Anorexia appetite improved.      3. Precordial pain reviewed labs ordered by Dr. Burnett, EF is 73% no evidence of ischemia is noted no dye kinesis is noted     4. Dermatitis his eczema has resolved clobetasol to be used only sparingly  clobetasol (TEMOVATE) 0.05 % ointment   5. Depression currently appears happy does not continue    Routine follow-up in 2 months  PARoxetine (PAXIL) 20 MG tablet       CHIEF COMPLAINT:  Chief Complaint   Patient presents with     Follow-up       HISTORY OF PRESENT ILLNESS:  Jj is a 67 y.o. male presenting for a follow-up. Jj is present with a Yolie  and daughter. He states that the eczema over his hands has improved but intermittently return. He has been using clobetasol cream over his hands.     Insomnia: He states that his insomnia is controlled.     Depression: He states that his depression is controlled. He wants to travel to different places and get out of the house more.     Hypertension: His current blood pressure is 98/74. He has been taking 2 tablets of 10mg lisinopril for the past 2 weeks.      REVIEW OF SYSTEMS:   He has been out of all medication for the past few days.   All other 10 point review of systems are negative.    PFSH:  Reviewed as below.     TOBACCO USE:  History   Smoking Status     Former Smoker     Years: 45.00     Quit date: 4/1/2015   Smokeless Tobacco     Former User       VITALS:  Vitals:    02/10/17 1417   BP: 98/74   Patient Site: Right Arm   Patient Position: Sitting   Cuff  Size: Adult Regular   Pulse: 60   Resp: 20   Temp: 98  F (36.7  C)   TempSrc: Oral   Weight: 126 lb (57.2 kg)     Wt Readings from Last 3 Encounters:   02/10/17 126 lb (57.2 kg)   01/31/17 128 lb (58.1 kg)   01/31/17 125 lb (56.7 kg)     Body mass index is 24 kg/(m^2).    PHYSICAL EXAM:  General: Alert, cooperative, no distress, appears stated age  Lungs: Clear to auscultation bilaterally, respirations unlabored  Chest wall: No tenderness or deformity  Heart: Regular rate and rhythm, S1 and S2 normal, no murmur, rub, or gallop  Neurologic:  A & O x 3.  No tremor, no focal findings.       DATA REVIEWED:  Additional History from Old Records Summarized (2): Reviewed Dr. Tripp's note from 1/31/2017 regarding hypertension.   Decision to Obtain Records (1): None  Radiology Tests Summarized or Ordered (1): Reviewed Chest CT report from 1/26/2017  Labs Reviewed or Ordered (1): None  Medicine Test Summarized or Ordered (1): Reviewed Stress Test report from 1/31/2017  Independent Review of EKG or X-RAY(2 each): None    The visit lasted a total of 10 minutes face to face with the patient. Over 50% of the time was spent counseling and educating the patient about hypertension and medication management.     ISylvia, am scribing for and in the presence of, Dr. Reyes.    I, Dr. Reyes, personally performed the services described in this documentation, as scribed by Sylvia Friedman in my presence, and it is both accurate and complete.      MEDICATIONS:  Current Outpatient Prescriptions   Medication Sig Dispense Refill     aspirin 81 MG EC tablet Take 81 mg by mouth daily.       clobetasol (TEMOVATE) 0.05 % ointment Apply topically daily as needed. 60 g 3     lisinopril (PRINIVIL,ZESTRIL) 10 MG tablet Take 1 tablet (10 mg total) by mouth daily. 30 tablet 11     omeprazole (PRILOSEC) 20 MG capsule Take 20 mg by mouth Daily before breakfast.       PARoxetine (PAXIL) 20 MG tablet Take 1 tablet (20 mg total) by mouth  every morning. 30 tablet 11     simvastatin (ZOCOR) 20 MG tablet Take 1 tablet (20 mg total) by mouth bedtime. 30 tablet 9     No current facility-administered medications for this visit.        Total Data Points: 4

## 2021-06-08 NOTE — PROGRESS NOTES
Thank you for asking the [facility] team to see Jj Rios in consultation in the [facility].     Assessment/Plan:     Chronic dyspnea and chest tightness sometimes at rest after eating and sometimes with limited exertion around his house. His history is difficult to clarify. He says the synotins predate his stroke. An echocardiogram in June 2015 reported a normal LV EF and trace AI. His cardiac exam today is normal. Recent EKG and labs from ER were personally reviewed.     We will start with a pharmacologic nuclear stress test as he is at high risk for cardiac ischemia. Will also check a D-dimer, BNP and TSH.     F/U pending above results.     Thank you       Current History:   Jj Rios is a 67 y.o. former heavy smoker with history of stroke in December 2014 per the medical chart, hypertension, hyperlipidemia and chronic dyspnea and chest tightness. He notes the CP and SOB occur together and can be with limited exertion around his home or after eating. There is no radiation and it lasts 2-3 days. He reports he will feel short of breath laying flat on his back and this improves if he rolls over. He only walks for 2-3 minutes at a time since his stroke and he uses a wheelchair whenever he goes out.     Last week he was sleepy and felt like his BP was high so he went to the ER. EKG showed sinus bradycardia and troponin was negative and so he was sent to the [facility] today. There has been no acute change in his chronic dyspnea or chest tightness. He denies palpitations or edema. He does have early satiety but has not lost weight. He denies vomiting, diarrhea, or fevers.      this past summer on simvastatin.     Past Medical History:     Past Medical History   Diagnosis Date     CVA (cerebrovascular accident)      Depression      HLD (hyperlipidemia)      Hypertension        Past Surgical History:   History reviewed. No pertinent past surgical history. No surgeries.    Family History:   History  "reviewed. No pertinent family history.  No cardiovascular disease.    Social History:    reports that he quit smoking about 21 months ago. He quit after 45.00 years of use. He has quit using smokeless tobacco. He reports that he does not drink alcohol or use illicit drugs.    Meds:     Current Outpatient Prescriptions   Medication Sig     aspirin 81 MG EC tablet Take 81 mg by mouth daily.     clobetasol (TEMOVATE) 0.05 % ointment Apply topically daily as needed.     lisinopril (PRINIVIL,ZESTRIL) 10 MG tablet Take 10 mg by mouth daily.     omeprazole (PRILOSEC) 20 MG capsule Take 20 mg by mouth Daily before breakfast.     PARoxetine (PAXIL) 20 MG tablet Take 1 tablet (20 mg total) by mouth every morning.     simvastatin (ZOCOR) 20 MG tablet Take 1 tablet (20 mg total) by mouth bedtime.       Allergies:   Review of patient's allergies indicates no known allergies.    Review of Systems:   Review of Systems:   General: WNL  Eyes: WNL  Ears/Nose/Throat: WNL  Lungs: Shortness of Breath  Heart: Shortness of Breath with activity  Stomach: WNL  Bladder: WNL  Muscle/Joints: Muscle Weakness  Skin: WNL  Nervous System: Loss of Balance  Mental Health: Confusion, Depression     Blood: WNL       Objective:      Physical Exam  @LASTENCWT:3@  5' 0.75\" (1.543 m)  @BMI:3@  Visit Vitals     /68 (Patient Site: Left Arm, Patient Position: Sitting, Cuff Size: Adult Regular)     Pulse 76     Resp 16     Ht 5' 0.75\" (1.543 m)     Wt 125 lb (56.7 kg)     BMI 23.81 kg/m2       General Appearance:   Alert, cooperative and in no acute distress.   HEENT:  No scleral icterus; the mucous membranes were pink and moist.   Neck: JVP flat. No thyromegaly. No HJR   Chest: The spine was straight. The chest was symmetric.   Lungs:   Respirations unlabored; the lungs are clear to auscultation.   Cardiovascular:   S1 and S2 normal and without murmur. No clicks or rubs. No carotid bruits noted. Right DP, PT, and radial pulses 2+. Left DP, PT, and " radial pulses 2+.   Abdomen:  No organomegaly, masses, bruits, or tenderness. Bowels sounds are present   Extremities: No cyanosis, clubbing, or edema.   Skin: No xanthelasma.   Neurologic: Mood and affect are appropriate.         Lab Review   Lab Results   Component Value Date     01/19/2017     09/14/2016     01/08/2016    K 4.0 01/19/2017    K 4.4 09/14/2016    K 4.5 01/08/2016     (H) 01/19/2017     09/14/2016     (H) 01/08/2016    CO2 23 01/19/2017    CO2 23 09/14/2016    CO2 23 01/08/2016    BUN 20 01/19/2017    BUN 18 09/14/2016    BUN 17 01/08/2016    CREATININE 1.19 01/19/2017    CREATININE 1.27 09/14/2016    CREATININE 1.17 01/08/2016    CALCIUM 8.6 01/19/2017    CALCIUM 9.4 09/14/2016    CALCIUM 9.4 01/08/2016     Lab Results   Component Value Date    WBC 9.1 01/19/2017    WBC 8.1 01/08/2016    WBC 8.9 07/07/2015    WBC 8.3 06/29/2015    HGB 12.7 (L) 01/19/2017    HGB 13.4 (L) 01/08/2016    HGB 13.9 (L) 07/07/2015    HCT 38.1 (L) 01/19/2017    HCT 40.0 01/08/2016    HCT 41.6 07/07/2015    MCV 84 01/19/2017    MCV 90 01/08/2016    MCV 90 07/07/2015     01/19/2017     01/08/2016     07/07/2015     Lab Results   Component Value Date    CHOL 193 09/14/2016    CHOL 203 (H) 08/19/2015    TRIG 215 (H) 09/14/2016    TRIG 111 08/19/2015    HDL 38 (L) 09/14/2016    HDL 43 08/19/2015    LDLDIRECT 167 (H) 07/07/2015     No results found for: BNP      Renetta Sanchez M.D.

## 2021-06-08 NOTE — PROGRESS NOTES
Clinic Care Coordination Contact    Community Health Worker Follow Up    Goals:     Goals Addressed                 This Visit's Progress       Patient Stated      Mental Health Management 2 (pt-stated)   On track     Goal statement: I would like to schedule an N-648 waiver evaluation and neuropsychological testing through [facility] within the next 90 days.    Date goal set: 4/17/20  Barriers: Language barrier, poor historian, history of stroke, memory problems  Strengths: Has support of immediate family, receiving PCA services, appears willing to accept support  Date to achieve by: 7/17/20  Patient expressed understanding of goal: Yes    Action steps to achieve this goal:  1.  [facility]2020 at 9am with Dr. Escalante.              CHW Next Follow-up: 7/17/2020    CHW Plan: [facility] if [facility], contact [facility] to confirm if the appt was attended    Jj ROMERO Rios reports he does plan to attend his N-648 appointment at [facility] as scheduled.    CHW did inform the [facility] worker of the appointment information.

## 2021-06-09 NOTE — PROGRESS NOTES
Clinic Care Coordination Contact    Community Health Worker Follow Up    Goals:     Goals Addressed                 This Visit's Progress       Patient Stated      COMPLETED: Mental Health Management 2 (pt-stated)   100%     Goal statement: I would like to schedule an N-648 waiver evaluation and neuropsychological testing through [facility] within the next 90 days.    Date goal set: 4/17/20  Barriers: Language barrier, poor historian, history of stroke, memory problems  Strengths: Has support of immediate family, receiving PCA services, appears willing to accept support  Date to achieve by: 7/17/20  Patient expressed understanding of goal: Yes    Personal Plan:  1. I scheduled and attended my [facility] N-648 appointment and will receive a citizenship waiver.  2. I will follow up with my [facility] worker regarding my citizenship process.              CHW Next Follow-up: [facility]    CHW Plan: Routing to [facility] to review for Maintenance    N-648 waiver will be received by [facility] and patient will need to follow up on citizenship concerns and process with the support of his [facility] worker and family.

## 2021-06-09 NOTE — PROGRESS NOTES
Clinic Care Coordination Contact    Situation: 45 day chart review completed by SW care coordinator.    Background:   - Most recent SW assessment completed on 4/17/20.  - Pt referred for ARMHS/in-home therapy through [facility]. ARMHS worker added to Care Team tab by CHW on 5/18/20.  - Pt referred to [facility] by PCP in April 2020. Pt scheduled for neuropsych evaluation with Dr. Escalante on 6/30/20 at 9am.  - Last outreach by CHW on 6/17/20. During this conversation, no new goals or concerns were identified. Pt was provided with a reminder for [facility] appointment on 6/30/20.    Assessment: All goals updated/addressed during this encounter. [facility] SW will continue monitoring goal(s) and progress made towards goal completion. Chart review to be completed by SW every 45 days during continued course of [facility] enrollment.     Plan/Recommendations:   1.) See updated goal.  2.) Continue scheduled outreach.    Goals        Patient Stated      Mental Health Management 2 (pt-stated)      Goal statement: I would like to schedule an N-648 waiver evaluation and neuropsychological testing through [facility] within the next 90 days.    Date goal set: 4/17/20  Barriers: Language barrier, poor historian, history of stroke, memory problems  Strengths: Has support of immediate family, receiving PCA services, appears willing to accept support  Date to achieve by: 7/17/20  Patient expressed understanding of goal: Yes    Action steps to achieve this goal:  1.  I will attend my [facility] appointment as scheduled on Tuesday 06/30/2020 at 9am with Dr. Escalante.

## 2021-06-09 NOTE — PROGRESS NOTES
Clinic Care Coordination Contact     Situation: Pt chart reviewed by SW care coordinator.     Background:   - Most recent SW assessment completed on 4/17/20.  - Pt referred for ARMHS/in-home therapy through [facility]. ARMHS worker added to Care Team tab by CHW on 5/18/20.  - Pt approved for N-648 waiver following neuropsychological testing through [facility] in June 2020.  - Last outreach by CHW on 7/17/20. During this conversation, no new goals or concerns were identified. Pt will continue to work with [facility] worker and family members to obtain [place] citizenship.     Assessment: All previous goals completed. Enrollment status adjusted.      Plan/Recommendations:   1.) Pt will be transitioned to [facility] maintenance at this time.   - If no new goals/concerns identified at next outreach in 2 months (on/around 9/17/20), CHW will route chart to [facility] SW for graduation review.

## 2021-06-09 NOTE — PROGRESS NOTES
"Jj Rios is a 70 y.o. male who is being evaluated via a billable telephone visit.      The patient has been notified of following:     \"This telephone visit will be conducted via a call between you and your physician/provider. We have found that certain health care needs can be provided without the need for a physical exam.  This service lets us provide the care you need with a short phone conversation.  If a prescription is necessary we can send it directly to your pharmacy.  If lab work is needed we can place an order for that and you can then stop by our lab to have the test done at a later time.    Telephone visits are billed at different rates depending on your insurance coverage. During this emergency period, for some insurers they may be billed the same as an in-person visit.  Please reach out to your insurance provider with any questions.    If during the course of the call the physician/provider feels a telephone visit is not appropriate, you will not be charged for this service.\"    Patient has given verbal consent to a Telephone visit? Yes    What phone number would you like to be contacted at? [phone]        Additional provider notes: Spoke to the patient and daughter.  Taking all medications as prescribed.  Unable to check blood pressure at home.  No acute headache, acute chest pain or shortness of breath.  No acute fever.  No known exposure to COVID-19.    He is complaining of itchiness on his hands bilaterally.  No rash but a little bumpy.  No blisters.  Daughter states Dr. Reyes prescribed a cream in the past that worked and was able to give me the name of the cream.  He was prescribed clobetasol.    Patient was able to speak in full sentences without difficulties.    Assessment/Plan:    1. Essential hypertension  - Comprehensive Metabolic Panel  He will come in follow-up next Monday.    2. Mixed hyperlipidemia  - Lipid [place] FASTING  He will come in for labs next Monday.    3. Rash   - " clobetasoL (TEMOVATE) 0.05 % cream; Apply thin layer twice a day  Dispense: 30 g; Refill: 0    4. Memory problems  Referred to psychology for citizenship waiver, was seen on 6/30/2020.    Phone call duration:  18  minutes    Dr. Donald Pavon  7/2/2020 12:23 PM

## 2021-06-10 NOTE — PROGRESS NOTES
ASSESSMENT AND PLAN:  1. History of stroke continue taking aspirin     2. Depression currently taking Paxil.  Hoang see his sleep well at night and feels occasionally sleep in the daytime.  Continue his medication and she does have a history of depression in the past.   PARoxetine (PAXIL) 20 MG tablet   3. Hypertension blood pressure well-controlled continue on current dose of lisinopril.      4. Hyperlipidemia on statin side effects noted         CHIEF COMPLAINT:  Chief Complaint   Patient presents with     Follow-up     BP check     Medication Refill       HISTORY OF PRESENT ILLNESS:  Jj is a 67 y.o. male presenting for a follow-up of hypertension. Jj is present with a Yolie . He is taking his lisinopril faithfully. His current blood pressure is 120/80 but he believes that his blood pressure will intermittently increase throughout the day. He does not check his blood pressure but he states he can feel when it is increasing. If his blood pressure increases he experiences a heavy headed sensation, fatigued and will sleep during the day.     REVIEW OF SYSTEMS:   He states that he is sleeping too often at night.   All other 10 point review of systems are negative.    PFSH:  Reviewed as below.     TOBACCO USE:  History   Smoking Status     Former Smoker     Years: 45.00     Quit date: 4/1/2015   Smokeless Tobacco     Former User       VITALS:  Vitals:    04/11/17 1406   BP: 120/80   Patient Site: Left Arm   Patient Position: Sitting   Cuff Size: Adult Regular   Pulse: 64   Resp: 16   Temp: 97.9  F (36.6  C)   TempSrc: Oral   Weight: 126 lb 7 oz (57.4 kg)     Wt Readings from Last 3 Encounters:   04/11/17 126 lb 7 oz (57.4 kg)   02/10/17 126 lb (57.2 kg)   01/31/17 128 lb (58.1 kg)     Body mass index is 24.09 kg/(m^2).    PHYSICAL EXAM:  General: Alert, cooperative, no distress, appears stated age  Lungs: Clear to auscultation bilaterally, respirations unlabored  Chest wall: No tenderness or deformity  Heart:  Regular rate and rhythm, S1 and S2 normal, no murmur, rub, or gallop  Neurologic:  A & O x 3.  No tremor, no focal findings.       DATA REVIEWED:  Additional History from Old Records Summarized (2): None  Decision to Obtain Records (1): None  Radiology Tests Summarized or Ordered (1): none  Labs Reviewed or Ordered (1): None  Medicine Test Summarized or Ordered (1): None  Independent Review of EKG or X-RAY(2 each): None    The visit lasted a total of 10 minutes face to face with the patient. Over 50% of the time was spent counseling and educating the patient about hypertension and medication adherence .     Sylvia VALLES, am scribing for and in the presence of, Dr. Reyes.    IDr. Reyes, personally performed the services described in this documentation, as scribed by Sylvia Friedman in my presence, and it is both accurate and complete.      MEDICATIONS:  Current Outpatient Prescriptions   Medication Sig Dispense Refill     aspirin 81 MG EC tablet Take 81 mg by mouth daily.       lisinopril (PRINIVIL,ZESTRIL) 10 MG tablet Take 1 tablet (10 mg total) by mouth daily. 30 tablet 11     omeprazole (PRILOSEC) 20 MG capsule Take 20 mg by mouth Daily before breakfast.       PARoxetine (PAXIL) 20 MG tablet Take 1 tablet (20 mg total) by mouth every morning. 30 tablet 11     simvastatin (ZOCOR) 20 MG tablet Take 1 tablet (20 mg total) by mouth bedtime. 30 tablet 9     clobetasol (TEMOVATE) 0.05 % ointment Apply topically daily as needed. 60 g 3     No current facility-administered medications for this visit.        Total Data Points: 0

## 2021-06-11 NOTE — PROGRESS NOTES
Clinic Care Coordination Contact     Situation: Pt chart reviewed by KYLEE care coordinator.     Background:   - Most recent SW assessment completed on 4/17/20.  - Moved to maintenance on 7/23/20. See SW note from this date for further detail regarding course of [facility] enrollment.  - Pt had a virtual follow up visit with PCP on 9/4/20. Future follow up visit with PCP is scheduled for 12/4/20.  - Patient Care Coordination Note in Snapshot tab was updated by [facility] SW on 9/30/20.  - Last outreach by CHW on 9/17/20. During this conversation, no new goals or concerns were identified. CHW assisted pt in scheduling 3 month follow up visit with PCP in December 2020.     Assessment: All previous goals completed. Episode resolved. Enrollment status adjusted. [facility] team members removed from Care Team.     Plan/Recommendations:   1.) Pt will be graduated from [facility] at this time.

## 2021-06-11 NOTE — TELEPHONE ENCOUNTER
----- Message from Donald Pavon MD sent at 9/4/2020 11:31 AM CDT -----  Can you please call the patient to schedule F2F visit in 3 months?    Thank you,    Dr. Donald Pavon  9/4/2020 11:32 AM

## 2021-06-11 NOTE — PROGRESS NOTES
Clinic Care Coordination Contact     Patient has completed all goals with Clinic Care Coordination.     Please review the chart and confirm if Graduation is approved.       - CHW assisted Jj Rios with scheduling a 3 month follow up with the PCP for 12/4/2020 at 2:20pm.

## 2021-06-11 NOTE — PROGRESS NOTES
"jJ Rios is a 70 y.o. male who is being evaluated via a billable telephone visit.      The patient has been notified of following:     \"This telephone visit will be conducted via a call between you and your physician/provider. We have found that certain health care needs can be provided without the need for a physical exam.  This service lets us provide the care you need with a short phone conversation.  If a prescription is necessary we can send it directly to your pharmacy.  If lab work is needed we can place an order for that and you can then stop by our lab to have the test done at a later time.    Telephone visits are billed at different rates depending on your insurance coverage. During this emergency period, for some insurers they may be billed the same as an in-person visit.  Please reach out to your insurance provider with any questions.    If during the course of the call the physician/provider feels a telephone visit is not appropriate, you will not be charged for this service.\"    Patient has given verbal consent to a Telephone visit? Yes    What phone number would you like to be contacted at? [phone]      Additional provider notes:   Spoke to daughter and patient.   He is doing well per daughter.  He has good days and bad days but no worsening depression symptoms since last visit.  No suicidal homicidal ideations.  He uses albuterol inhaler once or a week or once every other week.  No acute cough or wheezing.  He uses Flonase as needed, has been using 2-3 times a month.  He takes Paxil, Zocor, omeprazole and aspirin.  Daughter is helping him with his medications.  No medication side effects reported.  He has a rash on his hands, was given clobetasol cream.  Daughter states that the cream is not helping.     No known exposure to COVID-19.  No acute fever, sore throat, chest pain or shortness of breath.    This transcription uses voice recognition software, which may contain typographical " errors.        Assessment/Plan:  1. Essential hypertension  Blood reading medication well.  Will schedule in person visit in a few months.    2. Mixed hyperlipidemia  Recheck lipid at next visit.    3. Current moderate episode of major depressive disorder, unspecified whether recurrent (H)  Stable.  Continue current medication.    4. Rash  Will try triamcinolone cream.    - triamcinolone (KENALOG) 0.025 % ointment; Apply thin layer to affected area twice a day  Dispense: 30 g; Refill: 0      Phone call duration:  12  minutes    10:42- 10: 54 AM    Dr. Donald Pavon  9/4/2020 12:44 PM

## 2021-06-11 NOTE — TELEPHONE ENCOUNTER
CHW completed an outreach call, had to call 2x in a row as first time the phone went straight to voicemail, but on second try it was answered.    PCP 3 month follow up scheduled for 12/4 at 2:20pm

## 2021-06-12 NOTE — PROGRESS NOTES
ASSESMENT AND PLAN:  Diagnoses and all orders for this visit:    Local infection of skin and subcutaneous tissue  -     cephalexin (KEFLEX) 500 MG capsule; Take 1 capsule (500 mg total) by mouth 2 (two) times a day for 10 days.  Dispense: 20 capsule; Refill: 0  -     lanolin alcohol-mo-w.pet-ceres (EUCERIN) Crea; Apply thin layer to affected area twice a day  Dispense:  ; Refill: 0  Stop steroid cream.  Will try Eucerin cream and oral antibiotic.  Follow-up in 1 week.    Essential hypertension  Controlled.    Mixed hyperlipidemia  Tolerating medication well.  Lipid panel today.    Immunization due  -     Influenza,Quad,High Dose,PF 65 YR+    This transcription uses voice recognition software, which may contain typographical errors.        SUBJECTIVE: Jj Rios is 70-year-old male with history of hypertension, hyperlipidemia, depression and memory problems here to follow-up on rash on his right hand.  He is here with his daughter who provided most of the history.  He had virtual visit a month ago and was complaining of rash on his hand.  Prescribed triamcinolone cream.  Daughter states that she is worsening.  He denied pain but complaining of severe itchiness.  No fever or chills.    They did not bring his pill bottles but states he takes all his medications as prescribed.    No known exposure to COVID-19.  No symptoms.    Past Medical History:   Diagnosis Date     Anorexia      Cerebral vascular accident (H) 12/2014     CVA (cerebrovascular accident) (H)      Depression      GERD (gastroesophageal reflux disease)      High cholesterol      HLD (hyperlipidemia)      Hypertension      Insomnia      Patient Active Problem List   Diagnosis     Hypertension     Hyperlipidemia     Cataract     Depression     Rotator cuff syndrome of right shoulder     Insomnia     History of stroke     Blind left eye     Memory problem       Allergies:  No Known Allergies    Social History     Tobacco Use   Smoking Status Former Smoker  "    Years: 45.00     Quit date: 2015     Years since quittin.5   Smokeless Tobacco Former User       Review of systems otherwise negative except as listed in HPI.   Social History     Tobacco Use   Smoking Status Former Smoker     Years: 45.00     Quit date: 2015     Years since quittin.5   Smokeless Tobacco Former User       OBJECTICE: BP 96/58 (Patient Site: Left Arm, Patient Position: Sitting, Cuff Size: Adult Regular)   Pulse 64   Temp 98.3  F (36.8  C) (Oral)   Resp 16   Ht 5' 0.24\" (1.53 m)   Wt 119 lb 1 oz (54 kg)   SpO2 96%   BMI 23.07 kg/m      DATA REVIEWED:  Labs Reviewed or Ordered (1):      GEN-alert,  in no apparent distress.  HEENT- neck is supple.  CV-regular rate and rhythm with no murmur.   RESP-lungs clear to auscultation .  EXTREM- No edema.  SKIN- RIGHT HAND-dry skin with excoriations and erythema on the fingers, all 5.  No drainage or oozing.         Donald Pavon   11/3/2020   "

## 2021-06-12 NOTE — PROGRESS NOTES
ASSESSMENT AND PLAN:  1. History of stroke currently taking his aspirin no no weakness noted he does walk.  He notes no issues with chest pain.    2. Anorexia appetites been described as being poor no large changes in weight noted he reports that he is eating and does not have extreme choking.    3. Depression taking his fluoxetine no side effects noted PARoxetine (PAXIL) 20 MG tablet   4. Hyperlipidemia     5. Rhinitis he feels stuffy and has allergic rhinitis both nostrils are occluded Flonase prescribed side effects discussed    6. Coughing blood noticed coughing up blood not prominent occurs intermittently no large clots noted no associated chest pain follow-up within 3-4 weeks if symptoms persist or become more prevalent, macular earlier date    7. Encounter to vaccinate patient pneumococcal vaccine given    8. CVA (cerebral vascular accident)  aspirin 81 MG EC tablet       CHIEF COMPLAINT:  Chief Complaint   Patient presents with     Follow-up     med check     Cough     some blood      Fatigue     Medication Refill     [facility]       HISTORY OF PRESENT ILLNESS:  Jj is a 67 y.o. male presenting with cough. Jj is present with a Yolie . Daughter states that he has experienced a cough forfor the past few months. Last week, he would have a cough with bloody sputum. He notes that this has occurred 3 times since last week. The blood is fresh. Since the summer started, he has been experiencing a runny nose. He will also experience intermittent chest tightness. There is little change to his weight since last visit on 4/11/2017.     REVIEW OF SYSTEMS:   He snores at night.    All other 10 point review of systems are negative.    PFSH:  Reviewed as below.     TOBACCO USE:  History   Smoking Status     Former Smoker     Years: 45.00     Quit date: 4/1/2015   Smokeless Tobacco     Former User       VITALS:  Vitals:    08/11/17 1515   BP: 110/80   Patient Site: Left Arm   Patient  Position: Sitting   Cuff Size: Adult Regular   Pulse: 72   Resp: 16   Temp: 97.8  F (36.6  C)   TempSrc: Oral   Weight: 125 lb (56.7 kg)     Wt Readings from Last 3 Encounters:   08/11/17 125 lb (56.7 kg)   04/11/17 126 lb 7 oz (57.4 kg)   02/10/17 126 lb (57.2 kg)     Body mass index is 23.81 kg/(m^2).    PHYSICAL EXAM:  General: Alert, cooperative, no distress, appears stated age  Ears: Normal TM's and external ear canals, both ears  Nose: Bilateral inferior turbinate hypertrophy present. Mucosa is boggy  Throat: Lips, mucosa, and tongue normal; teeth and gums normal  Lungs: Clear to auscultation bilaterally, respirations unlabored  Chest wall: No tenderness or deformity  Heart: Regular rate and rhythm, S1 and S2 normal, no murmur, rub, or gallop  Abdomen: Soft, non tender, bowel sounds active all four quadrants, no masses, no organomegaly.  Neurologic:  A & O x 3.  No tremor, no focal findings.    DATA REVIEWED:  Additional History from Old Records Summarized (2): None  Decision to Obtain Records (1): none  Radiology Tests Summarized or Ordered (1): None  Labs Reviewed or Ordered (1): None  Medicine Test Summarized or Ordered (1): None  Independent Review of EKG or X-RAY(2 each): none    The visit lasted a total of 25 minutes face to face with the patient. Over 50% of the time was spent counseling and educating the patient about anorexia.  Discussed allergies with discussed , as was the symptom of hemoptysis    Sylvia VALLES, am scribing for and in the presence of, Dr. Reyes.    pito VALLES, personally performed the services described in this documentation, as scribed by Sylvia Friedman in my presence, and it is both accurate and complete.      MEDICATIONS:  Current Outpatient Prescriptions   Medication Sig Dispense Refill     aspirin 81 MG EC tablet TAKE 1 TABLET BY MOUTH DAILY 30 tablet 5     omeprazole (PRILOSEC) 20 MG capsule TAKE 1 CAPSULE BY MOUTH EVERY DAY 30 capsule 0     PARoxetine (PAXIL) 20  MG tablet Take 1 tablet (20 mg total) by mouth every morning. 30 tablet 11     simvastatin (ZOCOR) 20 MG tablet Take 1 tablet (20 mg total) by mouth bedtime. 30 tablet 9     clobetasol (TEMOVATE) 0.05 % ointment Apply topically daily as needed. 60 g 3     lisinopril (PRINIVIL,ZESTRIL) 10 MG tablet Take 1 tablet (10 mg total) by mouth daily. 30 tablet 11     No current facility-administered medications for this visit.        Total Data Points: 0

## 2021-06-12 NOTE — PROGRESS NOTES
ASSESSMENT AND PLAN:  1. Screen for colon cancer agrees for colonoscopy we will schedule this for him Ambulatory referral for Colonoscopy   2. Hypertension blood pressures well controlled    3. Dysphagia as late effect of stroke has problems with sleeping liquids watching diet no changes noted he has been without a statin    4. Depression  PARoxetine (PAXIL) 20 MG tablet   5. Hyperlipidemia for almost 1 week call the pharmacy to find a white is prescribed at last visit allergies improvement      6.  After taking Flonase continue medication follow-up in 3 months      Orders Placed This Encounter   Procedures     Ambulatory referral for Colonoscopy     Referral Priority:   Routine     Referral Type:   Colonoscopy     Referral Reason:   Evaluation and Treatment     Requested Specialty:   Gastroenterology     Number of Visits Requested:   1     There are no discontinued medications.    No Follow-up on file.    CHIEF COMPLAINT:  Chief Complaint   Patient presents with     Follow-up     cough     other     Pt okay to get colonoscopy        HISTORY OF PRESENT ILLNESS:  Jj is a 67 y.o. male presenting for a follow-up of cough/rhinnits. Jj is present with a [employer] . He states he has not had bloody sputum recently. He has been able to breath better at night as long as he uses the Flonase. He has an intermittent runny nose. He denies chest rightness.     Health Maintenance: He is due for a colonoscopy which he is agreeable to receiving.      History of Stroke: He will intermittently choking when he drinks thin liquids. He is taking his Asprin daily however he has not been taking simvastatin for the past few weeks due to pharmacy error.      REVIEW OF SYSTEMS:   He denies vomiting.   All other 10 point review of systems are negative.    PFSH:  Reviewed as below.     TOBACCO USE:  History   Smoking Status     Former Smoker     Years: 45.00     Quit date: 4/1/2015   Smokeless Tobacco     Former User        VITALS:  Vitals:    09/07/17 1448   BP: 102/74   Patient Site: Right Arm   Patient Position: Sitting   Cuff Size: Adult Regular   Pulse: 68   Temp: 97.7  F (36.5  C)   TempSrc: Oral   SpO2: 95%   Weight: 123 lb 2 oz (55.8 kg)     Wt Readings from Last 3 Encounters:   09/07/17 123 lb 2 oz (55.8 kg)   08/11/17 125 lb (56.7 kg)   04/11/17 126 lb 7 oz (57.4 kg)     Body mass index is 23.46 kg/(m^2).    PHYSICAL EXAM:  General: Alert, cooperative, no distress, appears stated age  Head: Normocephalic, without obvious abnormality, atraumatic  Nose: Nares normal, septum midline, mucosa normal, no drainage or sinus tenderness  Throat: Lips, mucosa, and tongue normal; teeth and gums normal  Lungs: Clear to auscultation bilaterally, respirations unlabored  Chest wall: No tenderness or deformity  Heart: Regular rate and rhythm, S1 and S2 normal, no murmur, rub, or gallop  Neurologic:  A & O x 3.  No tremor, no focal findings.       DATA REVIEWED:  Additional History from Old Records Summarized (2): None  Decision to Obtain Records (1): None  Radiology Tests Summarized or Ordered (1): None  Labs Reviewed or Ordered (1): None  Medicine Test Summarized or Ordered (1): None  Independent Review of EKG or X-RAY(2 each): None    The visit lasted a total of 10 minutes face to face with the patient. Over 50% of the time was spent counseling and educating the patient about cough.     ISylvia, am scribing for and in the presence of, Dr. Reyes.    pito VALLES, personally performed the services described in this documentation, as scribed by Sylvia Friedman in my presence, and it is both accurate and complete.      MEDICATIONS:  Current Outpatient Prescriptions   Medication Sig Dispense Refill     aspirin 81 MG EC tablet TAKE 1 TABLET BY MOUTH DAILY 30 tablet 5     fluticasone (FLONASE) 50 mcg/actuation nasal spray 2 sprays into each nostril daily. 10 g 11     lisinopril (PRINIVIL,ZESTRIL) 10 MG tablet Take 1 tablet  (10 mg total) by mouth daily. 30 tablet 11     omeprazole (PRILOSEC) 20 MG capsule TAKE 1 CAPSULE BY MOUTH EVERY DAY 30 capsule 2     PARoxetine (PAXIL) 20 MG tablet Take 1 tablet (20 mg total) by mouth every morning. 30 tablet 11     clobetasol (TEMOVATE) 0.05 % ointment Apply topically daily as needed. 60 g 3     simvastatin (ZOCOR) 20 MG tablet Take 1 tablet (20 mg total) by mouth at bedtime. 30 tablet 9     No current facility-administered medications for this visit.        Total Data Points: 0

## 2021-06-12 NOTE — PROGRESS NOTES
[facility] Care Coordination Contact      [facility] Six-Month Telephone Assessment    6 month telephone assessment completed on 10/14/20.    ER visits: No  Hospitalizations: No  TCU stays: No  Significant health status changes: no  Falls/Injuries: None reported  ADL/IADL changes: No  Changes in services: No    Caregiver Assessment follow up:  n/a    Goals: See POC in chart for goal progress documentation.     Will see member in 6 months for an annual health risk assessment.   Encouraged member to call CC with any questions or concerns in the meantime.     Gina Ramírez RN  [facility]  [phone]

## 2021-06-13 NOTE — PROGRESS NOTES
ASSESSMENT AND PLAN:  1. Major depressive disorder with single episode, in partial remission  On Paxil no side effects sleeping well mood and affect are reported as being normal with daughter    2. Pure hypercholesterolemia  Has been taking statin for almost 5 weeks no side effects noted continue simvastatin lipid panel today  - Lipid Profile    3. Adjustment insomnia  Active interactive in community no anxiety noted    4. Essential hypertension  Blood pressures well controlled on lisinopril repeat BMP today  - Basic Metabolic Panel      5.   Preventative health care  Recently had a colonoscopy diverticulosis noted with hemorrhoids recommendation was to have repeat scope in 10 years       Orders Placed This Encounter   Procedures     Lipid Profile     Order Specific Question:   Fasting is required?     Answer:   Yes     Basic Metabolic Panel     There are no discontinued medications.    No Follow-up on file.    CHIEF COMPLAINT:  Chief Complaint   Patient presents with     Follow-up     HTN       HISTORY OF PRESENT ILLNESS:  Jj is a 67 y.o. male presenting for a follow-up. Jj is present with a Yolie .     Hyperlipidemia: He has been taking the Zocor for the past 3 weeks. He denies generalized muscle cramps.     Hypertension: He is taking his lisinopril faithfully. His blood pressure today is 132/82. He denies chest pain and chest pressure.     Depression: His depression is currently controlled with Paxil.     Insomnia: His insomnia is currently controlled. He sleeps from 9PM-6 AM.    REVIEW OF SYSTEMS:   He denies nausea.    All other 10 point review of systems are negative.    PFSH:   Pertinent past, family, social and medical history reviewed.     TOBACCO USE:  History   Smoking Status     Former Smoker     Years: 45.00     Quit date: 4/1/2015   Smokeless Tobacco     Former User       VITALS:  Vitals:    10/11/17 0757   BP: 132/82   Patient Site: Left Arm   Patient Position: Sitting   Cuff Size: Adult  Regular   Pulse: 60   Temp: 97.6  F (36.4  C)   TempSrc: Oral   SpO2: 95%   Weight: 123 lb 9 oz (56 kg)     Wt Readings from Last 3 Encounters:   10/11/17 123 lb 9 oz (56 kg)   09/07/17 123 lb 2 oz (55.8 kg)   08/11/17 125 lb (56.7 kg)     Body mass index is 23.54 kg/(m^2).      PHYSICAL EXAM:  General: Alert, cooperative, no distress, appears stated age  Head: Normocephalic, without obvious abnormality, atraumatic  Lungs: Clear to auscultation bilaterally, respirations unlabored  Chest wall: No tenderness or deformity  Heart: Regular rate and rhythm, S1 and S2 normal, no murmur, rub, or gallop  CVS: No edema noted.   Neurologic: No tremor, no focal findings.    Psych: Oriented x3. Affect normal.     DATA REVIEWED:  Additional History from Old Records Summarized (2): None  Decision to Obtain Records (1): None  Radiology Tests Summarized or Ordered (1): None  Labs Reviewed or Ordered (1): Labs ordered.   Medicine Test Summarized or Ordered (1): None  Independent Review of EKG or X-RAY(2 each): None    The visit lasted a total of 10 minutes face to face with the patient. Over 50% of the time was spent counseling and educating the patient about multiple conditions.     ISylvia, am scribing for and in the presence of, Dr. Reyes.    Ipito, personally performed the services described in this documentation, as scribed by Sylvia Friedman in my presence, and it is both accurate and complete.      MEDICATIONS:  Current Outpatient Prescriptions   Medication Sig Dispense Refill     aspirin 81 MG EC tablet TAKE 1 TABLET BY MOUTH DAILY 30 tablet 5     fluticasone (FLONASE) 50 mcg/actuation nasal spray 2 sprays into each nostril daily. 10 g 11     lisinopril (PRINIVIL,ZESTRIL) 10 MG tablet Take 1 tablet (10 mg total) by mouth daily. 30 tablet 11     omeprazole (PRILOSEC) 20 MG capsule TAKE 1 CAPSULE BY MOUTH EVERY DAY 30 capsule 2     PARoxetine (PAXIL) 20 MG tablet Take 1 tablet (20 mg total) by mouth  every morning. 30 tablet 11     simvastatin (ZOCOR) 20 MG tablet Take 1 tablet (20 mg total) by mouth at bedtime. 30 tablet 9     clobetasol (TEMOVATE) 0.05 % ointment Apply topically daily as needed. 60 g 3     No current facility-administered medications for this visit.        Total Data Points: 1

## 2021-06-13 NOTE — PROGRESS NOTES
ASSESMENT AND PLAN:  Diagnoses and all orders for this visit:      1. Local infection of skin and subcutaneous tissue  Advised to use hand lotion multiple times a day.  Sample provided.  Advised to use lotion before going to bed and wear gloves to sleep.    2. Essential hypertension  Controlled.    3. Mixed hyperlipidemia  Continue current medication.  Check lipid at next visit.    4. Current moderate episode of major depressive disorder, unspecified whether recurrent (H)  Stable.  Continue current med.    This transcription uses voice recognition software, which may contain typographical errors.      SUBJECTIVE: Jj Rios is here to follow-up on rash on the right hand.  Steroid cream did not help.  He was given oral antibiotic and Eucerin cream last week.  Daughter states they were not able to  Eucerin cream but taking Keflex.  The rash seems to be improving but the itchiness is not improving.  No pain or drainage.  No acute fever or URI symptoms.  No other rashes.    Depression symptoms are stable.  He takes all his medications as prescribed per daughter.    Past Medical History:   Diagnosis Date     Anorexia      Cerebral vascular accident (H) 2014     CVA (cerebrovascular accident) (H)      Depression      GERD (gastroesophageal reflux disease)      High cholesterol      HLD (hyperlipidemia)      Hypertension      Insomnia      Patient Active Problem List   Diagnosis     Hypertension     Hyperlipidemia     Cataract     Depression     Rotator cuff syndrome of right shoulder     Insomnia     History of stroke     Blind left eye     Memory problem       Allergies:  No Known Allergies    Social History     Tobacco Use   Smoking Status Former Smoker     Years: 45.00     Quit date: 2015     Years since quittin.6   Smokeless Tobacco Former User       Review of systems otherwise negative except as listed in HPI.   Social History     Tobacco Use   Smoking Status Former Smoker     Years: 45.00     Quit  "date: 2015     Years since quittin.6   Smokeless Tobacco Former User       OBJECTICE: /82 (Patient Site: Right Arm, Patient Position: Sitting, Cuff Size: Adult Regular)   Pulse 92   Temp 97  F (36.1  C) (Oral)   Resp 16   Ht 5' 0.24\" (1.53 m)   Wt 119 lb 5 oz (54.1 kg)   SpO2 92%   BMI 23.12 kg/m        GEN-alert,  in no apparent distress.  HEENT- neck is supple.  CV-regular rate and rhythm with no murmur.   RESP-lungs clear to auscultation .  SKIN-generalized dry skin with some excoriation on the right hand.  No open lesions.  No wheezing.        Donald Pavon   2020   "

## 2021-06-13 NOTE — PROGRESS NOTES
"Jj Rios is a 70 y.o. male who is being evaluated via a billable telephone visit.      The patient has been notified of following:     \"This telephone visit will be conducted via a call between you and your physician/provider. We have found that certain health care needs can be provided without the need for a physical exam.  This service lets us provide the care you need with a short phone conversation.  If a prescription is necessary we can send it directly to your pharmacy.  If lab work is needed we can place an order for that and you can then stop by our lab to have the test done at a later time.    Telephone visits are billed at different rates depending on your insurance coverage. During this emergency period, for some insurers they may be billed the same as an in-person visit.  Please reach out to your insurance provider with any questions.    If during the course of the call the physician/provider feels a telephone visit is not appropriate, you will not be charged for this service.\"    Patient has given verbal consent to a Telephone visit? Yes    What phone number would you like to be contacted at? [phone]      Additional provider notes: Spoke to patient and daughter.   F/U: Rash, dry skin and itchiness on hands. Completed keflex.  Using hand lotion. Improving slowly both itchiness and the rash.  No new problem.  No known exposure to COVID 19. No COVID symptoms.   Insomnia is improving.    Assessment/Plan:      1. Local infection of skin and subcutaneous tissue  Completed Keflex.  Now using hand lotion once or twice a day. Advised to use multiple times a day.     2. Primary insomnia  Improving.    3. Itching  On hands. Will try claritin  - loratadine (CLARITIN) 10 mg tablet; Take 1 tablet (10 mg total) by mouth daily.  Dispense: 30 tablet; Refill: 2      Phone call duration:  8  minutes  F/U in 2 weeks.     Dr. Donald Pavon  11/20/2020 8:28 AM'  "

## 2021-06-14 NOTE — TELEPHONE ENCOUNTER
Refill Approved    Rx renewed per Medication Renewal Policy. Medication was last renewed on 4/3/20.    Sepideh Allen, [facility] Triage/Med Refill 1/6/2021     Requested Prescriptions   Pending Prescriptions Disp Refills     simvastatin (ZOCOR) 20 MG tablet [Pharmacy Med Name: SIMVASTATIN 20MG TABLETS] 90 tablet 2     Sig: TAKE 1 TABLET(20 MG) BY MOUTH AT BEDTIME       Statins Refill Protocol (Hmg CoA Reductase Inhibitors) Passed - 1/5/2021  5:15 AM        Passed - PCP or prescribing provider visit in past 12 months      Last office visit with prescriber/PCP: 11/12/2020 Donald Pavon MD OR same dept: 11/12/2020 Donald Pavon MD OR same specialty: 11/12/2020 Donald Pavon MD  Last physical: 12/20/2019 Last MTM visit: Visit date not found   Next visit within 3 mo: Visit date not found  Next physical within 3 mo: Visit date not found  Prescriber OR PCP: Donald Pavon MD  Last diagnosis associated with med order: 1. Pure hyperglyceridemia  - simvastatin (ZOCOR) 20 MG tablet [Pharmacy Med Name: SIMVASTATIN 20MG TABLETS]; TAKE 1 TABLET(20 MG) BY MOUTH AT BEDTIME  Dispense: 90 tablet; Refill: 2    If protocol passes may refill for 12 months if within 3 months of last provider visit (or a total of 15 months).

## 2021-06-14 NOTE — PROGRESS NOTES
ASSESSMENT AND PLAN:  1. Cough patient continues to cough.  Cough is prevalent to do whole day.  Daughter did purchase including coronary fishing for the patient did not make much difference.  Prescribed cetirizine rhonchi are present.  Chest x-ray today.  Personally reviewed x-ray no evidence of and found pulmonary infiltrate t hemogram also done today.  Reviewed results white count is not elevated HM1(CBC and Differential)    HM1 (CBC with Diff)   2. Bronchitis azithromycin started but not completed daughter is mistakenly been giving him 1 tablet every 2-3 days.  She difference the medication immediately he is afebrile and says he does not feel any better.  Further weight loss of more than 2 pounds noted since last visit    3. Weight loss daughter says he has a normal appetite which is does not eat that much.  Checking a hemogram today may restart Remeron for him. HM1(CBC and Differential)    XR Chest 2 Views    HM1 (CBC with Diff)       CHIEF COMPLAINT:  Chief Complaint   Patient presents with     Follow-up     allergies       HISTORY OF PRESENT ILLNESS:  Jj is a 67 y.o. male presenting for a follow-up. Jj is present with a Yolie . He has not completed the azithromycin course given on 11/28/2017 and has 2 tablets left. He has still been coughing and does not feel better. He has not been eating well.  He has not been sleeping well due to the cough.       REVIEW OF SYSTEMS:   He denies vomiting.    All other 10 point review of systems are negative.    PFSH:  Pertinent past, family, social and medical history reviewed.     TOBACCO USE:  History   Smoking Status     Former Smoker     Years: 45.00     Quit date: 4/1/2015   Smokeless Tobacco     Former User       VITALS:  Vitals:    12/07/17 1518   BP: 126/82   Patient Site: Left Arm   Patient Position: Sitting   Cuff Size: Adult Regular   Pulse: 68   Temp: 97.5  F (36.4  C)   TempSrc: Oral   SpO2: 92%   Weight: 120 lb 8 oz (54.7 kg)     Wt Readings from  Last 3 Encounters:   12/07/17 120 lb 8 oz (54.7 kg)   11/29/17 123 lb 3 oz (55.9 kg)   10/11/17 123 lb 9 oz (56 kg)     Body mass index is 22.96 kg/(m^2).    PHYSICAL EXAM:  General: Alert, cooperative, no distress, appears stated age  Head: Normocephalic, without obvious abnormality, atraumatic  Lungs: Inspiratory rhonchi present over right base.   Chest wall: No tenderness or deformity  Heart: Regular rate and rhythm, S1 and S2 normal, no murmur, rub, or gallop  CVS: No edema noted.   Neurologic: No tremor, no focal findings.  Psych: Oriented x3. Affect normal.     Chest x-ray: Personally reviewed and found no evidence of active interstitial disease.  Lungs are hypoinflated.  No evidence of atelectasis    DATA REVIEWED:  Additional History from Old Records Summarized (2): None  Decision to Obtain Records (1): None  Radiology Tests Summarized or Ordered (1): Chest x-ray ordered.   Labs Reviewed or Ordered (1):Labs ordered.   Medicine Test Summarized or Ordered (1): None  Independent Review of EKG or X-RAY(2 each): Personally interpreted chest x-ray.    The visit lasted a total of 13 minutes face to face with the patient. Over 50% of the time was spent counseling and educating the patient about bronchitis.     ISylvia, am scribing for and in the presence of, Dr. Reyes.    pito VALLES, personally performed the services described in this documentation, as scribed by Sylvia Friedman in my presence, and it is both accurate and complete.      MEDICATIONS:  Current Outpatient Prescriptions   Medication Sig Dispense Refill     aspirin 81 MG EC tablet TAKE 1 TABLET BY MOUTH DAILY 30 tablet 5     clobetasol (TEMOVATE) 0.05 % ointment Apply topically daily as needed. 60 g 3     codeine-guaiFENesin (GUAIFENESIN AC)  mg/5 mL liquid Take 5 mL by mouth 3 (three) times a day as needed for cough. 120 mL 0     fluticasone (FLONASE) 50 mcg/actuation nasal spray 2 sprays into each nostril daily. 10 g 11      lisinopril (PRINIVIL,ZESTRIL) 10 MG tablet Take 1 tablet (10 mg total) by mouth daily. 30 tablet 11     omeprazole (PRILOSEC) 20 MG capsule TAKE 1 CAPSULE BY MOUTH EVERY DAY 90 capsule 2     PARoxetine (PAXIL) 20 MG tablet Take 1 tablet (20 mg total) by mouth every morning. 30 tablet 11     simvastatin (ZOCOR) 20 MG tablet Take 1 tablet (20 mg total) by mouth at bedtime. 30 tablet 9     No current facility-administered medications for this visit.        Total Data Points: 4

## 2021-06-14 NOTE — TELEPHONE ENCOUNTER
PARoxetine (PAXIL) 20 MG tablet [[id number]]    Electronically signed by: Donald Pavon MD on 04/03/20 1511 Status: Discontinued   Ordering user: Donald Pavon MD 04/03/20 1511 Authorized by: Donald Pavon MD   Frequency:  04/03/20 - 06/04/20  Discontinued by: Donald Pavon MD 06/04/20 1001 [Reorder]   Diagnoses   Reactive depression [F32.9]

## 2021-06-14 NOTE — PROGRESS NOTES
ASSESSMENT AND PLAN:  1. Bronchitis coughing constantly for the last 2 days disturbed sleep rhonchi noted right upper lung.  No fever decreased appetite starting azithromycin starting codeine cough syrup side effects discussed follow-up in 2 weeks earlier if not better    2. Gastroesophageal reflux disease without esophagitis medication refilled no side effects noted omeprazole (PRILOSEC) 20 MG capsule   3. Adjustment insomnia follow-up recommended if medicine no longer works currently sleeping well except before his cough     4. Essential hypertension blood pressure well controlled continue current medications    5. Rhinitis Flonase refilled        CHIEF COMPLAINT:  Chief Complaint   Patient presents with     Cough     Nasal Congestion     Medication Refill       HISTORY OF PRESENT ILLNESS:  Jj is a 67 y.o. male presenting for a cough. Jj is present with a Yolie  and daughter. He has been experiencing nasal congestion and cough for the past few days.  His symptoms started as nasal congestion and later developed a cough. His cough worsens when he is laying down and his sleep is disturbed as a result. Daughter notes a known ill contact of granddaughter.  He denies sore throat and shortness of breath.     REVIEW OF SYSTEMS:   He denies diarrhea.    All other 10 point review of systems are negative.    PFSH:  . Pertinent past, family, social and medical history reviewed.     TOBACCO USE:  History   Smoking Status     Former Smoker     Years: 45.00     Quit date: 4/1/2015   Smokeless Tobacco     Former User       VITALS:  Vitals:    11/29/17 0905   BP: 124/84   Patient Site: Right Arm   Patient Position: Sitting   Cuff Size: Adult Regular   Pulse: (!) 101   Temp: 98.7  F (37.1  C)   TempSrc: Oral   SpO2: 92%   Weight: 123 lb 3 oz (55.9 kg)     Wt Readings from Last 3 Encounters:   11/29/17 123 lb 3 oz (55.9 kg)   10/11/17 123 lb 9 oz (56 kg)   09/07/17 123 lb 2 oz (55.8 kg)     Body mass index is 23.47  kg/(m^2).    PHYSICAL EXAM:  General: Alert, cooperative, no distress, appears stated age  Head: Normocephalic, without obvious abnormality, atraumatic  Ears: Normal TM's and external ear canals, both ears  Nose: Evidence of epistaxis over left inferior turbinate.     Throat: Erythema over posterior pharyngeal wall; significant post nasal drip noted.   Lungs: Rhonchi present over right apex.   Chest wall: No tenderness or deformity  Heart: Regular rate and rhythm, S1 and S2 normal, no murmur, rub, or gallop  CVS: No edema noted.   Abdomen: Soft, non tender, bowel sounds active all four quadrants, no masses, no organomegaly.  Neurologic: No tremor, no focal findings.     Psych: Oriented x3. Affect normal.     DATA REVIEWED:  Additional History from Old Records Summarized (2): None  Decision to Obtain Records (1): none  Radiology Tests Summarized or Ordered (1): None  Labs Reviewed or Ordered (1): None  Medicine Test Summarized or Ordered (1): None  Independent Review of EKG or X-RAY(2 each): None    The visit lasted a total of 11 minutes face to face with the patient. Over 50% of the time was spent counseling and educating the patient about cough.     ISylvia, am scribing for and in the presence of, Dr. Reyes.    Ipito, personally performed the services described in this documentation, as scribed by Sylvia Friedman in my presence, and it is both accurate and complete.      MEDICATIONS:  Current Outpatient Prescriptions   Medication Sig Dispense Refill     fluticasone (FLONASE) 50 mcg/actuation nasal spray 2 sprays into each nostril daily. 10 g 11     omeprazole (PRILOSEC) 20 MG capsule TAKE 1 CAPSULE BY MOUTH EVERY DAY 90 capsule 2     aspirin 81 MG EC tablet TAKE 1 TABLET BY MOUTH DAILY 30 tablet 5     clobetasol (TEMOVATE) 0.05 % ointment Apply topically daily as needed. 60 g 3     lisinopril (PRINIVIL,ZESTRIL) 10 MG tablet Take 1 tablet (10 mg total) by mouth daily. 30 tablet 11      PARoxetine (PAXIL) 20 MG tablet Take 1 tablet (20 mg total) by mouth every morning. 30 tablet 11     simvastatin (ZOCOR) 20 MG tablet Take 1 tablet (20 mg total) by mouth at bedtime. 30 tablet 9     No current facility-administered medications for this visit.        Total Data Points: 0

## 2021-06-14 NOTE — PROGRESS NOTES
ASSESSMENT AND PLAN:  1. Chronic cough patient continues to have a cough.  He stopped his lisinopril his cough is improved somewhat however he still has a nocturnal cough he was seen in the emergency room.  Testing was done which included an unremarkable chest x-ray, BMP, hemogram.  I have represcribed the cetirizine because a believe this is due to postnasal drip.  Follow-up in 2 months.    2. Depression his daughter forgot to bring in his medications he says he is tired I believe this could be due to depression he is supposed to be taking his Paxil and when he was taking this medication he felt happy and was sleeping well    3. Insomnia his sleep is disturbed because of the cough anything because he is worrying I will not restart his Remeron because he stopped the medication himself no significant weight loss noted    4. Anorexia encourage him to help prepare meals encourage him to eat 3 meals a day he tends to eat 1 meal per day and have snacks        CHIEF COMPLAINT:  Chief Complaint   Patient presents with     Follow-up     ER for cough and fatigue.        HISTORY OF PRESENT ILLNESS:  jJ is a 67 y.o. male presenting for a follow-up of a cough. Jj is present with a [employer] . He presented to the [facility] ED on 12/10/2017 for an evaluation of a cough. On exam, he was found to have crackles over the right base. He had a chest x-ray done which was unremarkable. His blood labs where also unremarkable. He has discontinued his lisinopril as that could be the cause of his chronic cough.     REVIEW OF SYSTEMS:   He notes that he has a poor appetite.   All other 10 point review of systems are negative.    PFSH:  . Pertinent past, family, social and medical history reviewed.     TOBACCO USE:  History   Smoking Status     Former Smoker     Years: 45.00     Quit date: 4/1/2015   Smokeless Tobacco     Former User       VITALS:  Vitals:    12/12/17 1107   BP: 112/78   Patient Site: Left Arm   Patient Position:  Sitting   Cuff Size: Adult Regular   Pulse: 67   Temp: 97.4  F (36.3  C)   TempSrc: Oral   SpO2: 95%   Weight: 122 lb 1 oz (55.4 kg)     Wt Readings from Last 3 Encounters:   12/12/17 122 lb 1 oz (55.4 kg)   12/07/17 120 lb 8 oz (54.7 kg)   11/29/17 123 lb 3 oz (55.9 kg)     Body mass index is 23.25 kg/(m^2).    PHYSICAL EXAM:  General: Alert, cooperative, no distress, appears stated age  Musculoskeletal: Back symmetric, no curvature, ROM normal, no CVA tenderness.  Lungs: Clear to auscultation bilaterally, respirations unlabored  Chest wall: No tenderness or deformity  Heart: Regular rate and rhythm, S1 and S2 normal, no murmur, rub, or gallop  CVS: No edema noted.   Neurologic: No tremor, no focal findings.    Psych: Oriented x3. Affect normal.     DATA REVIEWED:  Additional History from Old Records Summarized (2): Reviewed Dr. Crawford's note from 12/10/2017 regarding cough.   Decision to Obtain Records (1): None  Radiology Tests Summarized or Ordered (1): Reviewed Chest x-ray from 12/10/2017.   Labs Reviewed or Ordered (1): Labs reviewed from 12/10/2017.   Medicine Test Summarized or Ordered (1): None  Independent Review of EKG or X-RAY(2 each): None    The visit lasted a total of 13 minutes face to face with the patient. Over 50% of the time was spent counseling and educating the patient about cough.     ISylvia, am scribing for and in the presence of, Dr. Reyes.    pito VALLES, personally performed the services described in this documentation, as scribed by Sylvia Friedman in my presence, and it is both accurate and complete.      MEDICATIONS:  Current Outpatient Prescriptions   Medication Sig Dispense Refill     lisinopril (PRINIVIL,ZESTRIL) 10 MG tablet Take 1 tablet (10 mg total) by mouth daily. 30 tablet 11     albuterol (PROAIR HFA;PROVENTIL HFA;VENTOLIN HFA) 90 mcg/actuation inhaler Inhale 1-2 puffs every 6 (six) hours as needed for wheezing. 1 Inhaler 0     aspirin 81 MG EC tablet TAKE 1  TABLET BY MOUTH DAILY 30 tablet 5     clobetasol (TEMOVATE) 0.05 % ointment Apply topically daily as needed. 60 g 3     codeine-guaiFENesin (GUAIFENESIN AC)  mg/5 mL liquid Take 5 mL by mouth 3 (three) times a day as needed for cough. 120 mL 0     fluticasone (FLONASE) 50 mcg/actuation nasal spray 2 sprays into each nostril daily. 10 g 11     omeprazole (PRILOSEC) 20 MG capsule TAKE 1 CAPSULE BY MOUTH EVERY DAY 90 capsule 2     PARoxetine (PAXIL) 20 MG tablet Take 1 tablet (20 mg total) by mouth every morning. 30 tablet 11     predniSONE (DELTASONE) 20 MG tablet Take 2 tablets (40 mg total) by mouth daily for 5 days. 10 tablet 0     simvastatin (ZOCOR) 20 MG tablet Take 1 tablet (20 mg total) by mouth at bedtime. 30 tablet 9     No current facility-administered medications for this visit.        Total Data Points: 4

## 2021-06-15 NOTE — PROGRESS NOTES
[facility] Care Coordination  [facility] Home Visit Assessment     Annual Health Risk Assessment with Jj Amira Rios completed on 3/12/21 by phone due to covid-19 restrictions.      Type of residence:: Apartment  Current living arrangement:: I live in a private home with family     Current Care Plan  Member currently receiving the following home care services: none  Member currently receiving the following community resources: PCA    Medication Review  Medication reconciliation completed in Epic: IF NO, PLEASE EXPLAIN due to phone assessment  Medication set-up & administration: Family/informal caregiver sets up daily  Family caregiver administers medications  Medication Risk Assessment Medication (1 or more, place referral to MTM):  N/A: No risk factors identified  MTM Referral Placed: No: No risk factors idenified    Mental/Behavioral Health   Depression Screening:    PHQ-2 Total Score: 2       Mental health DX:: Yes(Depression)   Mental health DX how managed:: Medication    Falls Assessment:   Fallen 2 or more times in the past year?: No   Any fall with injury in the past year?: No    ADL/IADL Dependencies:   Dependent ADLs:: Ambulation-cane, Bathing, Dressing, Grooming, Incontinence, Transfers, Toileting  Dependent IADLs:: Cleaning, Cooking, Laundry, Shopping, Meal Preparation, Transportation, Money Management, Incontinence    [facility] sponsored benefits: Shared information re: Silver Sneakers/gym memberships, ASA, Calcium +D.    PCA Assessment completed at visit: Yes Annual PCA assessment indicated 22 units per day of PCA. This is a decrease from the previous assessment. due to member reporting that he is able to feed himself.       Elderly Waiver Eligibility: Yes - will continue on EW    Care Plan & Recommendations: continue goal for fall prevention    See LTCC for detailed assessment information.    Follow-Up Plan: Member informed of future contact, plan to f/u with member with a 6  month telephone assessment.  Contact information shared with member and family, encouraged member to call with any questions or concerns at any time.    Gina Ramírez RN  [facility]  [phone]

## 2021-06-15 NOTE — PROGRESS NOTES
Jj Rios is a 71 y.o. male who is being evaluated via a billable telephone visit.      What phone number would you like to be contacted at? [phone]  How would you like to obtain your AVS? AVS Preference: Mail a copy.      Subjective   Jj Rios is 71 y.o. and presents today for the following health issues   HPI I spoke to the patient and daughter today.  He was given lotion to use it for his hands dryness and itchiness.  States that lotion is not helping.  He cannot sleep at night due to itchiness.  No pain.  No other new symptoms.  Rashes only on the hands.      Review of Systems  No acute fever, cough, shortness of breath or chest pain.      Objective       Vitals:  No vitals were obtained today due to virtual visit.    Physical Exam  Patient was able to speak in full sentences without difficulties.  No audible cough or wheezing during telephone visit.        1. Pruritus  - hydrOXYzine HCL (ATARAX) 25 MG tablet; Take 1 tablet (25 mg total) by mouth at bedtime as needed for itching.  Dispense: 30 tablet; Refill: 0    2. Rash and nonspecific skin eruption  Continue lotion.  In person visit in 3 days.  Appointment scheduled.    Phone call duration: 8  Minutes    This transcription uses voice recognition software, which may contain typographical errors.    Dr. Donald Pavon  2/9/2021 3:26 PM

## 2021-06-15 NOTE — PROGRESS NOTES
jJ Rios is a 71 y.o. male who is being evaluated via a billable telephone visit.      What phone number would you like to be contacted at? [phone]      Subjective   Jj Rios is 71 y.o. and presents today for the following health issues   HPI   F/U on rash.  Referred to Derm, no appt scheduled yet.  Urea cream is not helping.  Topical steroid, eucerin did not help.  Itching is improving with hydroxyzine.      Review of Systems    No fever, cough, shortness of breath or sore throat.      Objective       Vitals:  No vitals were obtained today due to virtual visit.    Physical Exam  Unable to perform physical exam      1. Rash and nonspecific skin eruption  Stop urea cream  Called Derm, appt scheduled on 2/26/2021.    Phone call duration: 13 minutes    Dr. Donald Pavon  2/23/2021 9:26 AM

## 2021-06-15 NOTE — PROGRESS NOTES
ASSESMENT AND PLAN:  Diagnoses and all orders for this visit:    Rash and nonspecific skin eruption  -     clobetasoL (TEMOVATE) 0.05 % ointment; Apply thin layer to affected area twice a day  Dispense: 30 g; Refill: 0  -     Ambulatory referral to Dermatology  Urea cream provided, he will try for a week and start above ointment if no improvement.  Follow-up in 1 week.  Dermatology referral order placed.  Patient and daughter were in agreement with the plan.    Essential hypertension  Controlled.    Mixed hyperlipidemia  Continue current medication.  Recheck in a few months.    This transcription uses voice recognition software, which may contain typographical errors.      SUBJECTIVE: Jj Rios is a 71-year old with history of hypertension, hyperlipidemia and history of stroke here to follow-up on skin dryness and pruritus.  He tried topical hydrocortisone with no relief.  He tried Eucerin cream with no relief.  He was prescribed hydroxyzine to take it at night for pruritus, it helps but the relief only last for few hours.  The redness is improving but the itchiness and dryness on the skin is not improving.  He thinks his nails are falling out.  He developed similar symptoms in the right foot now.  It was only on his hands.  Daughter states steroid ointment provided in 2017 helped and wants to restart that.  It was clobetasol ointment per chart review.    He takes all his medications as prescribed.  No side effects reported.  She cannot checks blood pressure at home.  No known exposure to COVID-19.  No COVID-19 symptoms.    Past Medical History:   Diagnosis Date     Anorexia      Cerebral vascular accident (H) 12/2014     CVA (cerebrovascular accident) (H)      Depression      GERD (gastroesophageal reflux disease)      High cholesterol      HLD (hyperlipidemia)      Hypertension      Insomnia      Patient Active Problem List   Diagnosis     Hypertension     Hyperlipidemia     Cataract     Depression     Rotator  "cuff syndrome of right shoulder     Insomnia     History of stroke     Blind left eye     Memory problem       Allergies:  No Known Allergies    Social History     Tobacco Use   Smoking Status Former Smoker     Years: 45.00     Quit date: 2015     Years since quittin.8   Smokeless Tobacco Former User       Review of systems otherwise negative except as listed in HPI.   Social History     Tobacco Use   Smoking Status Former Smoker     Years: 45.00     Quit date: 2015     Years since quittin.8   Smokeless Tobacco Former User       OBJECTICE: /74 (Patient Site: Left Arm, Patient Position: Sitting, Cuff Size: Adult Regular)   Pulse 66   Temp 98.2  F (36.8  C) (Oral)   Resp 16   Ht 5' 0.24\" (1.53 m)   Wt 120 lb 2 oz (54.5 kg)   SpO2 95%   BMI 23.27 kg/m          GEN-alert,  in no apparent distress.  HEENT- neck is supple.  CV-regular rate and rhythm with no murmur.   RESP-lungs clear to auscultation .  ABDOMEN- Soft , not tender.  EXTREM- No joint swelling or tenderness.   SKIN- Both hands and right foot- dry skin dermatitis with some excoriation from itching.  No signs of infection.  No drainage.        Donald Pavon   2021   "

## 2021-06-15 NOTE — TELEPHONE ENCOUNTER
Patient requested transportation for upcoming appointment     [facility]  [address] [place], [address]   PHONE: [phone]  2/26/2021 @7:40 am   Prashant Dalal MD       Thank you,  Elena Santillan

## 2021-06-15 NOTE — TELEPHONE ENCOUNTER
Refill Approved    Rx renewed per Medication Renewal Policy. Medication was last renewed on 2/7/20.    Hoang Soriano, [facility] Triage/Med Refill 3/2/2021     Requested Prescriptions   Pending Prescriptions Disp Refills     fluticasone propionate (FLONASE) 50 mcg/actuation nasal spray [Pharmacy Med Name: FLUTICASONE 50MCG NASAL SP (120) RX] 16 g 3     Sig: SHAKE LIQUID AND USE 2 SPRAYS IN EACH NOSTRIL DAILY       Nasal Steroid Refill Protocol Passed - 3/1/2021  2:28 PM        Passed - Patient has had office visit/physical in last 2 years     Last office visit with prescriber/PCP: 2/16/2021 OR same dept: 2/16/2021 Donald Pavon MD OR same specialty: 2/16/2021 Donald Pavon MD Last physical: 12/20/2019 Last MTM visit: Visit date not found    Next appt within 3 mo: Visit date not found  Next physical within 3 mo: Visit date not found  Prescriber OR PCP: Donald Pavon MD  Last diagnosis associated with med order: 1. Acute rhinitis  - fluticasone propionate (FLONASE) 50 mcg/actuation nasal spray [Pharmacy Med Name: FLUTICASONE 50MCG NASAL SP (120) RX]; SHAKE LIQUID AND USE 2 SPRAYS IN EACH NOSTRIL DAILY  Dispense: 16 g; Refill: 3     If protocol passes may refill for 12 months if within 3 months of last provider visit (or a total of 15 months).

## 2021-06-16 PROBLEM — R32 URINARY INCONTINENCE, UNSPECIFIED TYPE: Status: ACTIVE | Noted: 2021-03-16

## 2021-06-16 PROBLEM — H54.40 BLIND LEFT EYE: Status: ACTIVE | Noted: 2019-06-20

## 2021-06-16 PROBLEM — R41.3 MEMORY PROBLEM: Status: ACTIVE | Noted: 2019-12-20

## 2021-06-16 NOTE — PROGRESS NOTES
[facility] Care Coordination    Received after visit chart from care coordinator.  Completed following tasks: Mailed copy of care plan to client, Updated services in access and Submitted referrals/auths for ADC   , Provider Signature - No POC Shared:  Member indicates that they do not want their POC shared with any [facility] providers.    [facility]:  Emailed completed PCA assessment to [facility].  Faxed copy of PCA assessment to [facility] Agency and mailed copy to member.  Faxed MD Communication to PCP.     Mailed POC signature page and  PCA signature page to member to sign and return asap.    Nicole Parsons  Care Management Specialist  [facility]  [phone]

## 2021-06-16 NOTE — TELEPHONE ENCOUNTER
Refill Approved    Rx renewed per Medication Renewal Policy. Medication was last renewed on 4/3/20.    Hoang Soriano, Care Connection Triage/Med Refill 4/23/2021     Requested Prescriptions   Pending Prescriptions Disp Refills     omeprazole (PRILOSEC) 20 MG capsule [Pharmacy Med Name: OMEPRAZOLE 20MG CAPSULES] 90 capsule 3     Sig: TAKE 1 CAPSULE(20 MG) BY MOUTH DAILY       GI Medications Refill Protocol Passed - 4/23/2021  5:18 AM        Passed - PCP or prescribing provider visit in last 12 or next 3 months.     Last office visit with prescriber/PCP: 2/16/2021 Donald Pavon MD OR same dept: 2/16/2021 Donald Pavon MD OR same specialty: 2/16/2021 Donald Pavon MD  Last physical: 12/20/2019 Last MTM visit: Visit date not found   Next visit within 3 mo: Visit date not found  Next physical within 3 mo: Visit date not found  Prescriber OR PCP: Donald Pavon MD  Last diagnosis associated with med order: 1. Gastroesophageal reflux disease without esophagitis  - omeprazole (PRILOSEC) 20 MG capsule [Pharmacy Med Name: OMEPRAZOLE 20MG CAPSULES]; TAKE 1 CAPSULE(20 MG) BY MOUTH DAILY  Dispense: 90 capsule; Refill: 3    If protocol passes may refill for 12 months if within 3 months of last provider visit (or a total of 15 months).

## 2021-06-16 NOTE — TELEPHONE ENCOUNTER
Reason for Call:  Medication or medication refill:    Do you use a [facility]?  Name of the pharmacy and phone number for the current request: [id number] - [place], [place] - [address] [place] [address]    Name of the medication requested: PARoxetine (PAXIL) 40 MG tablet    Other request: Jack Palmer (dtr, C2C on file) call to check on status of this request. Says patient is completely out    Can we leave a detailed message on this number? No    Phone number patient can be reached at: Other phone number:  [phone] (number is not in service until tomorrow.)    Best Time: tomorrow     Call taken on 3/23/2021 at 12:45 PM by Yinka Singh

## 2021-06-16 NOTE — TELEPHONE ENCOUNTER
CMT is confused - Paxil was increased to 40 mg tablet on 6/2020 by PCP but what dose is pt taking?  40 mg or 20 mg ?  Med was ordered for 30 days with 5 refills, meaning pt would be out of med by now.  Can PCP please clarify which dose pt to take?  Thanks.

## 2021-06-16 NOTE — TELEPHONE ENCOUNTER
Refill Approved    Rx renewed per Medication Renewal Policy. Medication was last renewed on 2/9/21.    Hoang Soriano, Care Connection Triage/Med Refill 3/25/2021     Requested Prescriptions   Pending Prescriptions Disp Refills     hydrOXYzine HCL (ATARAX) 25 MG tablet [Pharmacy Med Name: HYDROXYZINE HCL 25MG TABS (WHITE)] 30 tablet 0     Sig: TAKE 1 TABLET(25 MG) BY MOUTH AT BEDTIME AS NEEDED FOR ITCHING       Antihistamine Refill Protocol Passed - 3/24/2021  5:14 AM        Passed - Patient has had office visit/physical in last year     Last office visit with prescriber/PCP: 2/16/2021 Donald Pavon MD OR same dept: 2/16/2021 Donald Pavon MD OR same specialty: 2/16/2021 Donald Pavon MD  Last physical: 12/20/2019 Last MTM visit: Visit date not found   Next visit within 3 mo: Visit date not found  Next physical within 3 mo: Visit date not found  Prescriber OR PCP: Donald Pavon MD  Last diagnosis associated with med order: 1. Pruritus  - hydrOXYzine HCL (ATARAX) 25 MG tablet [Pharmacy Med Name: HYDROXYZINE HCL 25MG TABS (WHITE)]; TAKE 1 TABLET(25 MG) BY MOUTH AT BEDTIME AS NEEDED FOR ITCHING  Dispense: 30 tablet; Refill: 0    If protocol passes may refill for 12 months if within 3 months of last provider visit (or a total of 15 months).

## 2021-06-16 NOTE — PROGRESS NOTES
ASSESSMENT AND PLAN:  1. Depression he states that sometimes she feels well and sometimes she just feels well which he believes of function of his age he is taking the Paxil PHQ 9 reviewed today.  Some questions with regards to dosing the medication is his daughter is been combining medications at one bottle for several months of asked her not to do this in the future.    2. Chronic cough symptoms remain the same no problems with choking at this particular time    3. Essential hypertension on lisinopril again questioned about compliance noted his blood pressure was elevated I recheck it at his next visit.  His daughter is picking up the medication every month    4. Hyperlipidemia recheck your cholesterol at next visit he is on a statin    5. Insomnia difficulty sleeping also difficulty with eating and will try placing him again on mirtazapine side effects discussed with daughter today    6.  Anorexia weight is about the same appetite remains poor can only eat 2 meals per day side effect mirtazapine may cause him to gain weight we will recheck within 6 weeks    CHIEF COMPLAINT:  Chief Complaint   Patient presents with     Follow-up       HISTORY OF PRESENT ILLNESS:  Jj is a 68 y.o. male presenting for a follow-up. Jj is present with a Yolie .     Depression: Daughter states that he is taking his Paxil faithfully. Daughter has put multiple months of medication in the same bottle so there is more then 30 tablets in the bottle.     Hypertension: Daughter states that he takes his lisinopril 10mg most days. His blood pressure today is 136/86.     REVIEW OF SYSTEMS:   He states that he eats morning and evening but usually drinks milk for lunch. Daughter is concerned about his appetite and is wondering if there are medications to improve appetite.   All other 10 point review of systems are negative.    PFSH:   Pertinent past, family, social and medical history reviewed.     TOBACCO USE:  History   Smoking Status      Former Smoker     Years: 45.00     Quit date: 4/1/2015   Smokeless Tobacco     Former User       VITALS:  Vitals:    02/21/18 1406 02/21/18 1408   BP: (!) 150/92 136/86   Patient Site: Left Arm Left Arm   Patient Position: Sitting Sitting   Cuff Size: Adult Regular Adult Regular   Temp: 97.4  F (36.3  C)    TempSrc: Oral    Weight: 123 lb 4 oz (55.9 kg)      Wt Readings from Last 3 Encounters:   02/21/18 123 lb 4 oz (55.9 kg)   12/12/17 122 lb 1 oz (55.4 kg)   12/07/17 120 lb 8 oz (54.7 kg)     Body mass index is 23.48 kg/(m^2).    PHYSICAL EXAM:  General: Alert, cooperative, no distress, appears stated age  Lungs: Clear to auscultation bilaterally, respirations unlabored  Chest wall: No tenderness or deformity  Heart: Regular rate and rhythm, S1 and S2 normal, no murmur, rub, or gallop  CVS: No edema noted.   Neurologic: No tremor, no focal findings.     Psych: Oriented x3. Affect normal.     DATA REVIEWED:  Additional History from Old Records Summarized (2): None  Decision to Obtain Records (1): None  Radiology Tests Summarized or Ordered (1): None  Labs Reviewed or Ordered (1): None  Medicine Test Summarized or Ordered (1): None  Independent Review of EKG or X-RAY(2 each): None    The visit lasted a total of 26 minutes face to face with the patient. Over 50% of the time was spent counseling and educating the patient about follow-up.  For depression, insomnia, abdominal discomfort, hypertension and cough    Sylvia VALLES, am scribing for and in the presence of, Dr. Reyes.    Ipito, personally performed the services described in this documentation, as scribed by Sylvia Friedman in my presence, and it is both accurate and complete.      MEDICATIONS:  Current Outpatient Prescriptions   Medication Sig Dispense Refill     albuterol (PROAIR HFA;PROVENTIL HFA;VENTOLIN HFA) 90 mcg/actuation inhaler Inhale 1-2 puffs every 6 (six) hours as needed for wheezing. 1 Inhaler 0     aspirin 81 MG EC tablet  TAKE 1 TABLET BY MOUTH DAILY 30 tablet 5     cetirizine (ZYRTEC) 10 MG tablet Take 1 tablet (10 mg total) by mouth daily. 30 tablet 2     clobetasol (TEMOVATE) 0.05 % ointment Apply topically daily as needed. 60 g 3     codeine-guaiFENesin (GUAIFENESIN AC)  mg/5 mL liquid Take 5 mL by mouth 3 (three) times a day as needed for cough. 120 mL 0     fluticasone (FLONASE) 50 mcg/actuation nasal spray 2 sprays into each nostril daily. 10 g 11     lisinopril (PRINIVIL,ZESTRIL) 10 MG tablet Take 1 tablet (10 mg total) by mouth daily. 30 tablet 11     omeprazole (PRILOSEC) 20 MG capsule TAKE 1 CAPSULE BY MOUTH EVERY DAY 90 capsule 2     PARoxetine (PAXIL) 20 MG tablet Take 1 tablet (20 mg total) by mouth every morning. 30 tablet 11     simvastatin (ZOCOR) 20 MG tablet Take 1 tablet (20 mg total) by mouth at bedtime. 30 tablet 9     No current facility-administered medications for this visit.        Total Data Points: 0

## 2021-06-17 NOTE — TELEPHONE ENCOUNTER
Refill Approved    Rx renewed per Medication Renewal Policy. Medication was last renewed on 11/20/20.    Hoang Soriano, [facility] Triage/Med Refill 5/24/2021     Requested Prescriptions   Pending Prescriptions Disp Refills     loratadine (CLARITIN) 10 mg tablet [Pharmacy Med Name: LORATADINE 10MG TABLETS] 30 tablet 2     Sig: TAKE 1 TABLET(10 MG) BY MOUTH DAILY       Antihistamine Refill Protocol Passed - 5/23/2021  5:15 AM        Passed - Patient has had office visit/physical in last year     Last office visit with prescriber/PCP: 2/16/2021 Donald Pavon MD OR same dept: 2/16/2021 Donald Pavon MD OR same specialty: 2/16/2021 Donald Pavon MD  Last physical: 12/20/2019 Last MTM visit: Visit date not found   Next visit within 3 mo: Visit date not found  Next physical within 3 mo: Visit date not found  Prescriber OR PCP: Donald Pavon MD  Last diagnosis associated with med order: 1. Itching  - loratadine (CLARITIN) 10 mg tablet [Pharmacy Med Name: LORATADINE 10MG TABLETS]; TAKE 1 TABLET(10 MG) BY MOUTH DAILY  Dispense: 30 tablet; Refill: 2    If protocol passes may refill for 12 months if within 3 months of last provider visit (or a total of 15 months).

## 2021-06-17 NOTE — TELEPHONE ENCOUNTER
Refill Approved    Rx renewed per Medication Renewal Policy. Medication was last renewed on 4/3/20.    Hoang Soriano, Care Connection Triage/Med Refill 5/6/2021     Requested Prescriptions   Pending Prescriptions Disp Refills     albuterol (PROAIR HFA;PROVENTIL HFA;VENTOLIN HFA) 90 mcg/actuation inhaler [Pharmacy Med Name: ALBUTEROL HFA INH(200 PUFFS)18GM] 18 g 4     Sig: INHALE 1 TO 2 PUFFS BY MOUTH EVERY 6 HOURS AS NEEDED FOR WHEEZING       Albuterol/Levalbuterol Refill Protocol Passed - 5/5/2021 10:57 AM        Passed - PCP or prescribing provider visit in last year     Last office visit with prescriber/PCP: 2/16/2021 Donald Pavon MD OR same dept: 2/16/2021 Donald Pavon MD OR same specialty: 2/16/2021 Donald Pavon MD Last physical: 12/20/2019       Next appt within 3 mo: Visit date not found  Next physical within 3 mo: Visit date not found  Prescriber OR PCP: Donald Pavon MD  Last diagnosis associated with med order: 1. Chronic cough  - albuterol (PROAIR HFA;PROVENTIL HFA;VENTOLIN HFA) 90 mcg/actuation inhaler [Pharmacy Med Name: ALBUTEROL HFA INH(200 PUFFS)18GM]; INHALE 1 TO 2 PUFFS BY MOUTH EVERY 6 HOURS AS NEEDED FOR WHEEZING  Dispense: 18 g; Refill: 4    If protocol passes may refill for 6 months if within 3 months of last provider visit (or a total of 9 months). If patient requesting >1 inhaler per month refill x 6 months and have patient make appointment with provider.

## 2021-06-17 NOTE — PATIENT INSTRUCTIONS - HE
Patient Instructions by Donald Pavon MD at 12/20/2019 11:20 AM     Author: Donald Pavon MD Service: -- Author Type: Physician    Filed: 12/20/2019 12:55 PM Encounter Date: 12/20/2019 Status: Signed    : Donald Pavon MD (Physician)         Patient Education     Your Health Risk Assessment indicates you feel you are not in good physical health.    A healthy lifestyle helps keep the body fit and the mind alert. It helps protect you from disease, helps you fight disease, and helps prevent chronic disease (disease that doesn't go away) from getting worse. This is important as you get older and begin to notice twinges in muscles and joints and a decline in the strength and stamina you once took for granted. A healthy lifestyle includes good healthcare, good nutrition, weight control, recreation, and regular exercise. Avoid harmful substances and do what you can to keep safe. Another part of a healthy lifestyle is stay mentally active and socially involved.    Good healthcare     Have a wellness visit every year.     If you have new symptoms, let us know right away. Don't wait until the next checkup.     Take medicines exactly as prescribed and keep your medicines in a safe place. Tell us if your medicine causes problems.   Healthy diet and weight control     Eat 3 or 4 small, nutritious, low-fat, high-fiber meals a day. Include a variety of fruits, vegetables, and whole-grain foods.     Make sure you get enough calcium in your diet. Calcium, vitamin D, and exercise help prevent osteoporosis (bone thinning).     If you live alone, try eating with others when you can. That way you get a good meal and have company while you eat it.     Try to keep a healthy weight. If you eat more calories than your body uses for energy, it will be stored as fat and you will gain weight.     Recreation   Recreation is not limited to sports and team events. It includes any activity that provides relaxation, interest, enjoyment, and exercise.  Recreation provides an outlet for physical, mental, and social energy. It can give a sense of worth and achievement. It can help you stay healthy.       Patient Education     Exercise for a Healthier Heart  You may wonder how you can improve the health of your heart. If youre thinking about exercise, youre on the right track. You dont need to become an athlete, but you do need a certain amount of brisk exercise to help strengthen your heart. If you have been diagnosed with a heart condition, your doctor may recommend exercise to help stabilize your condition. To help make exercise a habit, choose safe, fun activities.       Be sure to check with your health care provider before starting an exercise program.    Why exercise?  Exercising regularly offers many healthy rewards. It can help you do all of the following:    Improve your blood cholesterol levels to help prevent further heart trouble    Lower your blood pressure to help prevent a stroke or heart attack    Control diabetes, or reduce your risk of getting this disease    Improve your heart and lung function    Reach and maintain a healthy weight    Make your muscles stronger and more limber so you can stay active    Prevent falls and fractures by slowing the loss of bone mass (osteoporosis)    Manage stress better  Exercise tips  Ease into your routine. Set small goals. Then build on them.  Exercise on most days. Aim for a total of 150 or more minutes of moderate to  vigorous intensity activity each week. Consider 40 minutes, 3 to 4 times a week. For best results, activity should last for 40 minutes on average. It is OK to work up to the 40 minute period over time. Examples of moderate-intensity activity is walking one mile in 15 minutes or 30 to 45 minutes of yard work.  Step up your daily activity level. Along with your exercise program, try being more active throughout the day. Walk instead of drive. Do more household tasks or yard work.  Choose one or more  activities you enjoy. Walking is one of the easiest things you can do. You can also try swimming, riding a bike, or taking an exercise class.  Stop exercising and call your doctor if you:    Have chest pain or feel dizzy or lightheaded    Feel burning, tightness, pressure, or heaviness in your chest, neck, shoulders, back, or arms    Have unusual shortness of breath    Have increased joint or muscle pain    Have palpitations or an irregular heartbeat      1227-7914 [employer]. [address] [place] [address]. All rights reserved. This information is not intended as a substitute for professional medical care. Always follow your healthcare professional's instructions.         Patient Education   Activities of Daily Living  Your Health Risk Assessment indicates you have difficulties with activities of daily living such as eating, getting dressed, grooming, bathing, walking, or using the toilet. Please make a follow up appointment for us to address this issue in more detail.     Patient Education   Instrumental Activities of Daily Living  Your Health Risk Assessment indicates you have difficulties with instrumental activities of daily living which include laundry, housekeeping, banking, shopping, using the telephone, food preparation, transportation, or taking your own medications. Please make a follow up appointment for us to address this issue in more detail.    [employer] has resources available on the following website: [email or web address]     Also, here is a local agency that provides help with meals and other assistance:   [facility] Line: 585.947.8947     Patient Education   Signs of Hearing Loss  Hearing loss is a problem shared by many people. In fact, it is one of the most common health conditions, particularly as people age. Most people over age 65 have some hearing loss, and by age 80, almost everyone does. Because hearing loss usually occurs slowly over  the years, you may not realize your hearing ability has gotten worse.       Have your hearing checked  Contact your [facility] care provider if you:    Have to strain to hear normal conversation.    Have to watch other peoples faces very carefully to follow what theyre saying.    Need to ask people to repeat what theyve said.    Often misunderstand what people are saying.    Turn the volume of the television or radio up so high that others complain.    Feel that people are mumbling when theyre talking to you.    Find that the effort to hear leaves you feeling tired and irritated.    Notice, when using the phone, that you hear better with 1 ear than the other.    6286-2478 [employer]. [address] [place] [address], [place] [address]. All rights reserved. This information is not intended as a substitute for professional medical care. Always follow your healthcare professional's instructions.         Patient Education   Depression and Suicide in Older Adults  Nearly 2 million older Americans have some type of depression. Sadly, some of them even take their own lives. Yet depression among older adults is often ignored. Learn the warning signs. You may help spare a loved one needless pain. You may also save a life.       What Is Depression?  Depression is a mood disorder that affects the way you think and feel. The most common symptom is a feeling of deep sadness. People who are depressed also may seem tired and listless. And nothing seems to give them pleasure. Its normal to grieve or be sad sometimes. But sadness lessens or passes with time. Depression rarely goes away or improves on its own. Other symptoms of depression are:    Sleeping more or less than normal    Eating more or less than normal    Having headaches, stomachaches, or other pains that dont go away    Feeling nervous, empty, or worthless    Crying a great deal    Thinking or talking about suicide or death    Feeling confused or forgetful  What Causes  It?  The causes of depression arent fully known. Certain chemicals in the brain play a role. Depression does run in families. And life stresses can also trigger depression in some people. That may be the case with older adults. They often face great burdens, such as the death of friends or a spouse. They may have failing health. And they are more likely to be alone, lonely, or poor.  How You Can Help  Often, depressed people may not want to ask for help. When they do, they may be ignored. Or, they may receive the wrong treatment. You can help by showing parents and older friends love and support. If they seem depressed, help them find the right treatment. Talk to your doctor. Or contact a local mental health center, social service agency, or hospital. With modern treatment, no one has to suffer from depression.  Resources:    National [place] of Mental Health  119.661.8351  www.nimh.nih.gov    National [place] on Mental Illness  337.295.8691  www.fernando.org    [facility]  265.254.2301  www.[facility].org    National Suicide Hotline  471.773.6393 (800-SUICIDE)      5067-8748 [employer]. [address] [place] [address]. All rights reserved. This information is not intended as a substitute for professional medical care. Always follow your healthcare professional's instructions.         Patient Education   Preventing Falls in the Home  As you get older, falls are more likely. Thats because your reaction time slows. Your muscles and joints may also get stiffer, making them less flexible. Illness, medications, and vision changes can also affect your balance. A fall could leave you unable to live on your own. To make your home safer, follow these tips:    Floors    Put nonskid pads under area rugs.    Remove throw rugs.    Replace worn floor coverings.    Tack carpets firmly to each step on carpeted stairs. Put nonskid strips on the edges of uncarpeted stairs.    Keep floors and stairs free  of clutter and cords.    Arrange furniture so there are clear pathways.    Clean up any spills right away.    Bathrooms    Install grab bars in the tub or shower.    Apply nonskid strips or put a nonskid rubber mat in the tub or shower.    Sit on a bath chair to bathe.    Use bathmats with nonskid backing.    Lighting    Keep a flashlight in each room.    Put a nightlight along the pathway between the bedroom and the bathroom.    3210-7563 [employer]. [address] [place] [address]. All rights reserved. This information is not intended as a substitute for professional medical care. Always follow your healthcare professional's instructions.         Patient Education   Understanding Advance Care Planning  Advance care planning is the process of deciding ones own future medical care. It helps ensure that if you cant speak for yourself, your wishes can still be carried out. The plan is a series of legal documents that note a persons wishes. The documents vary by state. Advance care planning may be done when a person has a serious illness that is expected to get worse. It may be done before major surgery. And it can help you and your family be prepared in case of a major illness or injury. Advance care planning helps with making decisions at these times.       A health care proxy is a person who acts as the voice of a patient when the patient cant speak for himself or herself. The name of this role varies by state. It may be called a Durable Medical Power of  or Durable Power of  for Healthcare. It may be called an agent, surrogate, or advocate. Or it may be called a representative or decision maker. It is an official duty that is identified by a legal document. The document also varies by state.    Why Is Advance Care Planning Important?  If a person communicates their healthcare wishes:    They will be given medical care that matches their values and goals.    Their family  members will not be forced to make decisions in a crisis with no guidance.  Creating a Plan  Making an advance care plan is often done in 3 steps:    Thinking about ones wishes. To create an advance care plan, you should think about what kind of medical treatment you would want if you lose the ability to communicate. Are there any situations in which you would refuse or stop treatment? Are there therapies you would want or not want? And whom do you want to make decisions for you? There are many places to learn more about how to plan for your care. Ask your doctor or  for resources.    Picking a health care proxy. This means choosing a trusted person to speak for you only when you cant speak for yourself. When you cannot make medical decisions, your proxy makes sure the instructions in your advance care plan are followed. A proxy does not make decisions based on his or her own opinions. They must put aside those opinions and values if needed, and carry out your wishes.    Filling out the legal documents. There are several kinds of legal documents for advance care planning. Each one tells health care providers your wishes. The documents may vary by state. They must be signed and may need to be witnessed or notarized. You can cancel or change them whenever you wish. Depending on your state, the documents may include a Healthcare Proxy form, Living Will, Durable Medical Power of , Advance Directive, or others.  The Familys Role  The best help a family can give is to support their loved ones wishes. Open and honest communication is vital. Family should express any concerns they have about the patients choices while the patient can still make decisions.    2566-6140 [employer]. [address] [place] [address], [place] [address]. All rights reserved. This information is not intended as a substitute for professional medical care. Always follow your healthcare professional's  instructions.         Also, [facility] offers a free, downloadable health care directive that allows you to share your treatment choices and personal preferences if you cannot communicate your wishes. It also allows you to appoint another person (called a health care agent) to make health care decisions if you are unable to do so. You can download an advance directive by going here: [email or web address]     Patient Education   Personalized Prevention Plan  You are due for the preventive services outlined below.  Your care team is available to assist you in scheduling these services.  If you have already completed any of these items, please share that information with your care team to update in your medical record.  Health Maintenance   Topic Date Due   ? DEPRESSION ACTION PLAN  1950   ? ADVANCE CARE PLANNING  01/01/1967   ? MEDICARE ANNUAL WELLNESS VISIT  07/16/2016   ? ZOSTER VACCINES (1 of 2) 08/17/2016   ? INFLUENZA VACCINE RULE BASED (1) 08/01/2019   ? FALL RISK ASSESSMENT  11/27/2019   ? LIPID  10/11/2022   ? TD 18+ HE  06/22/2026   ? COLONOSCOPY  09/26/2027   ? HEPATITIS C SCREENING  Completed   ? PNEUMOCOCCAL IMMUNIZATION 65+ LOW/MEDIUM RISK  Completed

## 2021-06-17 NOTE — PROGRESS NOTES
"ASSESSMENT AND PLAN:  1. Dysphagia as late effect of stroke no changes in swallowing noted.  Patient's been able to protect his airway no gagging noted    2. Anorexia appetites about the same no gain weight however he is able to eat    3. Primary insomnia sleeping well no side effects noted    4. Major depressive disorder with single episode, in partial remission PHQ 9 reviewed he denies being sad or depressed he is tired but that is because of his hypertension    5. Essential hypertension blood pressures elevated, increasing lisinopril to 20 mg a day recheck in 2 weeks    6. Pure hyperglyceridemia on a statin no side effects noted          No orders of the defined types were placed in this encounter.    There are no discontinued medications.    CHIEF COMPLAINT:  Chief Complaint   Patient presents with     Depression       HISTORY OF PRESENT ILLNESS:  jJ is a 68 y.o. male presenting for a follow-up. Jj is present with a Yolie .    Depression: He is taking is Paxil faithfully. His PHQ-9 score today is 2. He states that his life is going well.     Hypertension: He is taking his lisinopril faithfully. His blood pressure today is 150/86. He notes that he can tell when his blood pressure is elevated because he becomes very fatigued and dizzy.    Hyperlipidemia: He is taking his statin faithfully.     REVIEW OF SYSTEMS:   He denies nausea.    All other 10 point review of systems are negative.    PFSH:  . Pertinent past, family, social and medical history reviewed.     TOBACCO USE:  History   Smoking Status     Former Smoker     Years: 45.00     Quit date: 4/1/2015   Smokeless Tobacco     Former User       VITALS:  Vitals:    05/10/18 1414   BP: 150/86   Patient Site: Right Arm   Patient Position: Sitting   Cuff Size: Adult Regular   Pulse: 62   Resp: 16   Temp: 97.5  F (36.4  C)   TempSrc: Oral   SpO2: 97%   Weight: 120 lb 2 oz (54.5 kg)   Height: 5' 0.75\" (1.543 m)     Wt Readings from Last 3 Encounters: "   05/10/18 120 lb 2 oz (54.5 kg)   02/21/18 123 lb 4 oz (55.9 kg)   12/12/17 122 lb 1 oz (55.4 kg)     Body mass index is 22.88 kg/(m^2).    PHYSICAL EXAM:  General: Alert, cooperative, no distress, appears stated age  Head: Normocephalic, without obvious abnormality, atraumatic  Lungs: Clear to auscultation bilaterally, respirations unlabored  Chest wall: No tenderness or deformity  Heart: Regular rate and rhythm, S1 and S2 normal, no murmur, rub, or gallop  Neurologic: No tremor, no focal findings.     Psych: Oriented x3. Affect normal.     DATA REVIEWED:  Additional History from Old Records Summarized (2): None  Decision to Obtain Records (1): None  Radiology Tests Summarized or Ordered (1): None  Labs Reviewed or Ordered (1): Reviewed Labs from 12/10/2017.   Medicine Test Summarized or Ordered (1): None  Independent Review of EKG or X-RAY(2 each): None    The visit lasted a total of 25 minutes face to face with the patient. Over 50% of the time was spent counseling and educating the patient about follow-up.  Regarding depression, hypertension, hyperlipidemia and insomnia    Sylvia VALLES, am scribing for and in the presence of, Dr. Reyes.    Ipito, personally performed the services described in this documentation, as scribed by Sylvia Friedman in my presence, and it is both accurate and complete.      MEDICATIONS:  Current Outpatient Prescriptions   Medication Sig Dispense Refill     aspirin 81 MG EC tablet TAKE 1 TABLET BY MOUTH DAILY 90 tablet 1     lisinopril (PRINIVIL,ZESTRIL) 10 MG tablet Take 1 tablet (10 mg total) by mouth daily. 30 tablet 11     omeprazole (PRILOSEC) 20 MG capsule TAKE 1 CAPSULE BY MOUTH EVERY DAY 90 capsule 2     PARoxetine (PAXIL) 20 MG tablet Take 1 tablet (20 mg total) by mouth every morning. 30 tablet 11     simvastatin (ZOCOR) 20 MG tablet Take 1 tablet (20 mg total) by mouth at bedtime. 30 tablet 9     albuterol (PROAIR HFA;PROVENTIL HFA;VENTOLIN HFA) 90  mcg/actuation inhaler Inhale 1-2 puffs every 6 (six) hours as needed for wheezing. 1 Inhaler 0     cetirizine (ZYRTEC) 10 MG tablet Take 1 tablet (10 mg total) by mouth daily. 30 tablet 2     clobetasol (TEMOVATE) 0.05 % ointment Apply topically daily as needed. 60 g 3     codeine-guaiFENesin (GUAIFENESIN AC)  mg/5 mL liquid Take 5 mL by mouth 3 (three) times a day as needed for cough. 120 mL 0     fluticasone (FLONASE) 50 mcg/actuation nasal spray 2 sprays into each nostril daily. 10 g 11     mirtazapine (REMERON) 7.5 MG tablet TAKE 1 TABLET(7.5 MG) BY MOUTH AT BEDTIME 90 tablet 3     No current facility-administered medications for this visit.        Total Data Points: 0

## 2021-06-18 NOTE — PROGRESS NOTES
"ASSESSMENT AND PLAN:  1. Dizziness patient complains of dizziness still is not drinking much water, checking a BMP today no postural symptoms noted. HM1(CBC and Differential)    Basic Metabolic Panel    HM1 (CBC with Diff)   2. Fatigue patient sleeping for most the day he does go to adult  and there he is active.  Checking a hemogram again today.  Denies headaches altered sensorium.  Daughter says he is acting normally.  He is not depressed. HM1(CBC and Differential)    HM1 (CBC with Diff)   3. Hypertension he is responded well to the lisinopril 20 mg she is normotensive today.    4. Primary insomnia sleeping well at night the daytime somnolence is just occurred in the last 2 weeks        CHIEF COMPLAINT:  Chief Complaint   Patient presents with     Hypertension     Headache     x 2-3 days     Dizziness       HISTORY OF PRESENT ILLNESS:  Jj is a 68 y.o. male presenting for a follow-up. Jj is present with a [employer] .     Hypertension: He is taking lisinopril 20mg faithfully. His blood pressure today is 124/80    Dizziness: He has been experiencing intermittent headaches and dizziness for the past 2 months. He notes that the episodes cause him to feel somnolent throughout the day.      GERD: He is taking omeprazole faithfully.  He denies epigastric abdominal pain.     REVIEW OF SYSTEMS:   He denies vomiting.   He notes that he drinks about 3 glasses of water a day.   All other 10 point review of systems are negative.    PFSH:  . Pertinent past, family, social and medical history reviewed.     TOBACCO USE:  History   Smoking Status     Former Smoker     Years: 45.00     Quit date: 4/1/2015   Smokeless Tobacco     Former User       VITALS:  Vitals:    06/27/18 1648   BP: 124/80   Patient Site: Right Arm   Patient Position: Sitting   Cuff Size: Adult Regular   Pulse: 80   Resp: 16   Temp: 98.1  F (36.7  C)   TempSrc: Oral   Weight: 125 lb 1 oz (56.7 kg)   Height: 5' 0.75\" (1.543 m)     Wt Readings from " Last 3 Encounters:   06/27/18 125 lb 1 oz (56.7 kg)   06/12/18 122 lb (55.3 kg)   05/10/18 120 lb 2 oz (54.5 kg)     Body mass index is 23.83 kg/(m^2).    PHYSICAL EXAM:  General: Alert, cooperative, no distress, appears stated age  Head: Normocephalic, without obvious abnormality, atraumatic  Lungs: Clear to auscultation bilaterally, respirations unlabored  Chest wall: No tenderness or deformity  Heart: Regular rate and rhythm, S1 and S2 normal, no murmur, rub, or gallop  CVS: No edema noted.   Neurologic: No tremor, no focal findings.    Psych: Oriented x3. Affect normal.     DATA REVIEWED:  Additional History from Old Records Summarized (2): None  Decision to Obtain Records (1): none  Radiology Tests Summarized or Ordered (1): none  Labs Reviewed or Ordered (1): Labs ordered .  Medicine Test Summarized or Ordered (1): None  Independent Review of EKG or X-RAY(2 each): none    The visit lasted a total of 14 minutes face to face with the patient. Over 50% of the time was spent counseling and educating the patient about follow-up.     Sylvia VALLES, am scribing for and in the presence of, Dr. Reyes.    Ipito personally performed the services described in this documentation, as scribed by Sylvia Friedman in my presence, and it is both accurate and complete.      MEDICATIONS:  Current Outpatient Prescriptions   Medication Sig Dispense Refill     aspirin 81 MG EC tablet TAKE 1 TABLET BY MOUTH DAILY 90 tablet 1     lisinopril (PRINIVIL,ZESTRIL) 20 MG tablet Take 1 tablet (20 mg total) by mouth daily. 30 tablet 5     PARoxetine (PAXIL) 20 MG tablet Take 1 tablet (20 mg total) by mouth every morning. 30 tablet 11     simvastatin (ZOCOR) 20 MG tablet Take 1 tablet (20 mg total) by mouth at bedtime. 30 tablet 9     albuterol (PROAIR HFA;PROVENTIL HFA;VENTOLIN HFA) 90 mcg/actuation inhaler Inhale 1-2 puffs every 6 (six) hours as needed for wheezing. 1 Inhaler 0     cetirizine (ZYRTEC) 10 MG tablet Take 1  tablet (10 mg total) by mouth daily. 30 tablet 2     clobetasol (TEMOVATE) 0.05 % ointment Apply topically daily as needed. (Patient not taking: Reported on 6/12/2018) 60 g 3     codeine-guaiFENesin (GUAIFENESIN AC)  mg/5 mL liquid Take 5 mL by mouth 3 (three) times a day as needed for cough. 120 mL 0     fluticasone (FLONASE) 50 mcg/actuation nasal spray 2 sprays into each nostril daily. 10 g 11     mirtazapine (REMERON) 7.5 MG tablet TAKE 1 TABLET(7.5 MG) BY MOUTH AT BEDTIME 90 tablet 3     omeprazole (PRILOSEC) 20 MG capsule TAKE 1 CAPSULE BY MOUTH EVERY DAY 90 capsule 2     triamcinolone (NASACORT) 55 mcg nasal inhaler Use spray per nostril daily 1 Inhaler 12     No current facility-administered medications for this visit.        Total Data Points: 1    Please note that this clinical encounter uses voice recognition software, there may be typographical errors present

## 2021-06-18 NOTE — PROGRESS NOTES
ASSESSMENT AND PLAN:  1. Hypertension now normotensive, no side effects noted with increased dose of lisinopril to 20 mg daily.  Continue current dose.    2. Other acute rhinitis continues to have rhinitis she now understands that this is a chronic problem he was using the Flonase as a as needed medication only given particular side effects and concerns regarding safety with Flonase I am switching over to Nasacort.    3. Reactive depression denies being depressed continue to take all medications did not bring them in for review today daughter helps with dosing    4. Major depressive disorder with single episode, in partial remission sleeping well still taking mirtazapine. mirtazapine (REMERON) 7.5 MG tablet   5. Adjustment insomnia         CHIEF COMPLAINT:  Chief Complaint   Patient presents with     URI     C/o Cough, Nasal Congestion, Hard to breath, Headaches x 4 days       HISTORY OF PRESENT ILLNESS:  Jj is a 68 y.o. male presenting with cough. Jj is present with a [employer] . For the past 4 days, he has been experiencing runny nose, intermittent headaches and mild shortness of breath. Over the past day, he has developed a cough as well. He has not had a fever over the past 4 days. He denies known ill contacts. In the past, he has been taking Flonase as needed for episodes of epistaxis.     Hypertension: He is taking his medications faithfully. His blood pressure is currently controlled at 106/70.       REVIEW OF SYSTEMS:   He denies vomiting. He has not been eating well.   All other 10 point review of systems are negative.    PFSH:  . Pertinent past, family, social and medical history reviewed.     TOBACCO USE:  History   Smoking Status     Former Smoker     Years: 45.00     Quit date: 4/1/2015   Smokeless Tobacco     Former User       VITALS:  Vitals:    06/12/18 1002   BP: 106/70   Patient Site: Right Arm   Patient Position: Sitting   Cuff Size: Adult Regular   Pulse: 64   Resp: 18   Temp: 98.3  F  "(36.8  C)   TempSrc: Oral   SpO2: 96%   Weight: 122 lb (55.3 kg)   Height: 5' 0.75\" (1.543 m)     Wt Readings from Last 3 Encounters:   06/12/18 122 lb (55.3 kg)   05/10/18 120 lb 2 oz (54.5 kg)   02/21/18 123 lb 4 oz (55.9 kg)     Body mass index is 23.24 kg/(m^2).      PHYSICAL EXAM:  General: Alert, cooperative, no distress, appears stated age  Head: Normocephalic, without obvious abnormality, atraumatic  Ears: Normal TM's and external ear canals, both ears  Nose: Bilateral inferior turbinate hypertrophy present. Left > Right.   Throat: Lips, mucosa, and tongue normal; teeth and gums normal  Musculoskeletal: Back symmetric, no curvature, ROM normal, no CVA tenderness.  Lungs: Clear to auscultation bilaterally, respirations unlabored  Chest wall: No tenderness or deformity  Heart: Regular rate and rhythm, S1 and S2 normal, no murmur, rub, or gallop  CVS: No edema noted.   Neurologic: No tremor, no focal findings.    Psych: Oriented x3. Affect normal.     DATA REVIEWED:  Additional History from Old Records Summarized (2): None  Decision to Obtain Records (1): none  Radiology Tests Summarized or Ordered (1): None  Labs Reviewed or Ordered (1): None  Medicine Test Summarized or Ordered (1): None  Independent Review of EKG or X-RAY(2 each): None    The visit lasted a total of 12 minutes face to face with the patient. Over 50% of the time was spent counseling and educating the patient about nasal congestion.   ISylvia, am scribing for and in the presence of, Dr. Reyes.    pito VALLES, personally performed the services described in this documentation, as scribed by Sylvia Friedman in my presence, and it is both accurate and complete.      MEDICATIONS:  Current Outpatient Prescriptions   Medication Sig Dispense Refill     albuterol (PROAIR HFA;PROVENTIL HFA;VENTOLIN HFA) 90 mcg/actuation inhaler Inhale 1-2 puffs every 6 (six) hours as needed for wheezing. 1 Inhaler 0     aspirin 81 MG EC tablet TAKE 1 " TABLET BY MOUTH DAILY 90 tablet 1     cetirizine (ZYRTEC) 10 MG tablet Take 1 tablet (10 mg total) by mouth daily. 30 tablet 2     fluticasone (FLONASE) 50 mcg/actuation nasal spray 2 sprays into each nostril daily. 10 g 11     lisinopril (PRINIVIL,ZESTRIL) 20 MG tablet Take 1 tablet (20 mg total) by mouth daily. 30 tablet 5     mirtazapine (REMERON) 7.5 MG tablet TAKE 1 TABLET(7.5 MG) BY MOUTH AT BEDTIME 90 tablet 3     omeprazole (PRILOSEC) 20 MG capsule TAKE 1 CAPSULE BY MOUTH EVERY DAY 90 capsule 2     PARoxetine (PAXIL) 20 MG tablet Take 1 tablet (20 mg total) by mouth every morning. 30 tablet 11     simvastatin (ZOCOR) 20 MG tablet Take 1 tablet (20 mg total) by mouth at bedtime. 30 tablet 9     clobetasol (TEMOVATE) 0.05 % ointment Apply topically daily as needed. (Patient not taking: Reported on 6/12/2018) 60 g 3     codeine-guaiFENesin (GUAIFENESIN AC)  mg/5 mL liquid Take 5 mL by mouth 3 (three) times a day as needed for cough. 120 mL 0     No current facility-administered medications for this visit.        Total Data Points: 0

## 2021-06-19 NOTE — LETTER
Letter by Gina Ramírez RN at      Author: Gina Ramírez RN Service: -- Author Type: --    Filed:  Encounter Date: 5/28/2019 Status: (Other)       May 28, 2019    [facility]  [address]  [place] [address]        Dear Maria Del Carmen:    At [facility], we are dedicated to improving your health and well-being. Enclosed is the Comprehensive Care Plan that we developed with you on 5/20/19. Please review the Care Plan carefully.    As a reminder, some of the things we discussed at your visit include:    Your physical and mental health    Ways to reduce falls    Health care needs you may have    Dont forget to contact your care coordinator if you:    Have been hospitalized or plan to be hospitalized     Have had a fall     Have experienced a change in physical health    Are experiencing emotional problems     If you do not agree with your Care Plan, have questions about it, or have experienced a change in your needs, please call me at [phone]. If you are hearing impaired, please call the [place] Relay at [phone] or [phone] (wipuqj-mi-zruzjm relay service).    Sincerely,          Gina Ramírez RN    E-mail: [email or web address]  Phone: [phone]    Care Manager  [id number] [place] [id number]     [id number] (11/18)

## 2021-06-19 NOTE — LETTER
Letter by Gina Ramírez RN at      Author: Gina Ramírez RN Service: -- Author Type: --    Filed:  Encounter Date: 5/23/2019 Status: (Other)       [address], [address]  [place] [address]  Phone:  [phone]  Fax:  [phone]        May 23, 2019    [facility]  [address]  [place] [address]    Dear Prabhu Gardner is a copy of your completed PCA Assessment and Service Plan.  This is for your records and no action is required by you.  If you have additional questions regarding your assessment please contact me at [phone]. If you feel that your needs are not being met, please contact the Clinical Supervisor at [phone].    Sincerely,      Gina Ramírez RN    E-mail: [email or web address]  Phone: [phone]    Care Manager  [facility]            Enclosure:  Completed PCA assessment

## 2021-06-20 NOTE — LETTER
Letter by Donald Pavon MD at      Author: Donald Pavon MD Service: -- Author Type: --    Filed:  Encounter Date: 12/20/2019 Status: Signed                    My Depression Action Plan  Name: Jj Rios   Date of Birth 1950  Date: 12/20/2019    My Doctor: Donald Pavon MD   My Clinic: [facility]   [address] [place] [id number]  [phone]          GREEN    ZONE   Good Control    What it looks like:     Things are going generally well. You have normal ups and downs. You may even feel depressed from time to time, but bad moods usually last less than a day.   What you need to do:  1. Continue to care for yourself (see self care plan)  2. Check your depression survival kit and update it as needed  3. Follow your physicians recommendations including any medication.  4. Do not stop taking medication unless you consult with your physician first.           YELLOW         ZONE Getting Worse    What it looks like:     Depression is starting to interfere with your life.     It may be hard to get out of bed; you may be starting to isolate yourself from others.    Symptoms of depression are starting to last most all day and this has happened for several days.     You may have suicidal thoughts but they are not constant.   What you need to do:     1. Call your care team, your response to treatment will improve if you keep your care team informed of your progress. Yellow periods are signs an adjustment may need to be made.     2. Continue your self-care, even if you have to fake it!    3. Talk to someone in your support network    4. Open up your depression survival kit           RED    ZONE Medical Alert - Get Help    What it looks like:     Depression is seriously interfering with your life.     You may experience these or other symptoms: You cant get out of bed most days, cant work or engage in other necessary activities, you have trouble taking care of basic hygiene, or basic responsibilities,  thoughts of suicide or death that will not go away, self-injurious behavior.     What you need to do:  1. Call your care team and request a same-day appointment. If they are not available (weekends or after hours) call your local crisis line, emergency room or 911.            Depression Self Care Plan / Survival Kit    Self-Care for Depression  Heres the deal. Your body and mind are really not as separate as most people think.  What you do and think affects how you feel and how you feel influences what you do and think. This means if you do things that people who feel good do, it will help you feel better.  Sometimes this is all it takes.  There is also a place for medication and therapy depending on how severe your depression is, so be sure to consult with your medical provider and/ or Behavioral Health Consultant if your symptoms are worsening or not improving.     In order to better manage my stress, I will:    Exercise  Get some form of exercise, every day. This will help reduce pain and release endorphins, the feel good chemicals in your brain. This is almost as good as taking antidepressants!  This is not the same as joining a gym and then never going! (they count on that by the way?) It can be as simple as just going for a walk or doing some gardening, anything that will get you moving.      Hygiene   Maintain good hygiene (Get out of bed in the morning, Make your bed, Brush your teeth, Take a shower, and Get dressed like you were going to work, even if you are unemployed).  If your clothes don't fit try to get ones that do.    Diet  I will strive to eat foods that are good for me, drink plenty of water, and avoid excessive sugar, caffeine, alcohol, and other mood-altering substances.  Some foods that are helpful in depression are: complex carbohydrates, B vitamins, flaxseed, fish or fish oil, fresh fruits and vegetables.    Psychotherapy  I agree to participate in Individual Therapy (if  recommended).    Medication  If prescribed medications, I agree to take them.  Missing doses can result in serious side effects.  I understand that drinking alcohol, or other illicit drug use, may cause potential side effects.  I will not stop my medication abruptly without first discussing it with my provider.    Staying Connected With Others  I will stay in touch with my friends, family members, and my primary care provider/team.    Use your imagination  Be creative.  We all have a creative side; it doesnt matter if its oil painting, sand castles, or mud pies! This will also kick up the endorphins.    Witness Beauty  (AKA stop and smell the roses) Take a look outside, even in mid-winter. Notice colors, textures. Watch the squirrels and birds.     Service to others  Be of service to others.  There is always someone else in need.  By helping others we can get out of ourselves and remember the really important things.  This also provides opportunities for practicing all the other parts of the program.    Humor  Laugh and be silly!  Adjust your TV habits for less news and crime-drama and more comedy.    Control your stress  Try breathing deep, massage therapy, biofeedback, and meditation. Find time to relax each day.     My support system    Clinic Contact:  Phone number:    Contact 1:  Phone number:    Contact 2:  Phone number:    [place]/:  Phone number:    Therapist:  Phone number:    [facility] crisis center:    Phone number:    Other community support:  Phone number:

## 2021-06-20 NOTE — LETTER
Letter by Gina Ramírez RN at      Author: Gina Ramírez RN Service: -- Author Type: --    Filed:  Encounter Date: 4/6/2020 Status: (Other)       April 6, 2020    [facility]  [address]  [place] [address]        Dear Maria Del Carmen:    At [facility], we are dedicated to improving your health and well-being. Enclosed is the Comprehensive Care Plan that we developed with you on 4/3/20. Please review the Care Plan carefully.    As a reminder, some of the things we discussed at your visit include:    Your physical and mental health    Ways to reduce falls    Health care needs you may have    Dont forget to contact your care coordinator if you:    Have been hospitalized or plan to be hospitalized     Have had a fall     Have experienced a change in physical health    Are experiencing emotional problems     If you do not agree with your Care Plan, have questions about it, or have experienced a change in your needs, please call me at [phone]. If you are hearing impaired, please call the [place] [employer]ay at 441 or [phone] (wheyof-wx-yvqyjf relay service).    Sincerely,          Gina Ramírez RN    E-mail: [email or web address]  Phone: [phone]    Care Manager  [id number] [place] [phone]     [id number] (11/18)

## 2021-06-20 NOTE — LETTER
Letter by Veronica Gooden BSW at      Author: Veronica Gooden BSW Service: -- Author Type: --    Filed:  Encounter Date: 4/17/2020 Status: (Other)       CARE COORDINATION  [facility]  [address], [address]  [place] [address]    April 17, 2020    Jj Collier [facility]  [address]  [place] [address]      Dear Jj,    I am a clinic care coordinator  who works with Donald Pavon MD at the [facility]. I wanted to thank you for spending the time to talk with me.  Below is a description of clinic care coordination and how I can further assist you.      The clinic care coordination team is made up of a registered nurse,  and community health worker who understand the health care system. The goal of clinic care coordination is to help you manage your health and improve access to the health care system in the most efficient manner. The team can assist you in meeting your health care goals by providing education, coordinating services, strengthening the communication among your providers and supporting you with any resource needs.    Please feel free to contact me and the Community Health Worker, Judd, at [phone] with any questions or concerns. We are focused on providing you with the highest-quality healthcare experience possible and that all starts with you.     Sincerely,     KAIA Perla, LSW    Enclosed: I have enclosed a copy of the Care Plan. This has helpful information and goals that we have talked about. Please keep this in an easy to access place to use as needed.

## 2021-06-20 NOTE — LETTER
Letter by Veronica Gooden BSW at      Author: Veronica Gooden BSW Service: -- Author Type: --    Filed:  Encounter Date: 4/17/2020 Status: (Other)       Care Plan  About Me:    Patient Name:  Jj Rios    YOB: 1950  Age:         70 y.o.   [facility] MRN:    [id number] Telephone Information:  Home Phone [phone]   Mobile [phone]       Address:  [address]  [place] [address] Email address:  No e-mail address on record      Emergency Contact(s)  Extended Emergency Contact Information      Name: Jack Palmer  Address:       [address] [place] [address]  Relation: Child      [place] [address]  Home Phone Number: [phone]  Relation: Child      [place] [address]  Home Phone Number: [phone]  Relation: Other          Primary language:  Yolie     needed? Yes   [facility]:  [phone] op. 1  Other communication barriers: Language barrier, Lack of coping  Preferred Method of Communication:     Current living arrangement: I live in a private home with family  Mobility Status/ Medical Equipment: Independent w/Device    Health Maintenance  Health Maintenance Reviewed: Not assessed    My Access Plan  Medical Emergency 911   Primary Clinic Line Donald Pavon MD - [phone]   24 Hour Appointment Line [phone] or  [phone]) (toll-free)   24 Hour Nurse Line [phone] (toll-free)   Preferred Urgent Care [facility], [phone]   Preferred Hospital [facility]  [phone]   Preferred Pharmacy [id number] - [place], [place] - [address] AT [address] [facility]     Behavioral Health Crisis Line The National Suicide Prevention Lifeline at 1-294.643.3192 or 911             My Care Team Members  Patient Care Team       Relationship Specialty Notifications Start End    Donald Pavon MD PCP - General Family  Medicine  6/20/19     Phone: [phone] Fax: [phone]         [address] [place] [address]    Gina Ramírez, RN Lead Care Coordinator Primary Care - CC Admissions 11/14/19     Phone: [phone] Fax: [phone]        Donald Pavon MD Assigned PCP   12/2/19     Phone: [phone] Fax: [phone]         [address] [place] [address] [address] [place] [address]    Veronica Gooden BSW Clinic Care Coordinator Primary Care - CC Admissions 4/17/20     Fax: [phone]         Sarah Cdaena RN Clinic Care Coordinator Primary Care - CC Admissions 4/17/20     Fax: [phone]                 My Care Plans  Self Management and Treatment Plan  Goals and (Comments)  Goals        General    Mental Health Management 1 (pt-stated)     Notes - Note edited  4/17/2020  4:49 PM by Veronica Gooden BSW    Goal statement: I would like to be assigned an [facility] worker within the next 60 days.    Date goal set: 4/17/20  Barriers: Language barrier, poor historian, history of stroke, memory problems  Strengths: Has support of immediate family, receiving PCA services, appears willing to accept support  Date to achieve by: 6/17/20  Patient expressed understanding of goal: Yes    Action steps to achieve this goal:  1.  [facility] SW submitted [facility] and in-home therapy referral to [facility] on 4/16/20.  2.  My daughter will answer the phone when [facility] contacts her to schedule my initial diagnostic assessment.  3.  My daughter will learn the name and phone number of my [facility] worker once assigned and provide this information to CHW at outreach.       Mental Health Management 2 (pt-stated)     Notes - Note created  4/17/2020  4:52 PM by Veronica Gooden BSW    Goal statement: I would like to schedule an N-648 waiver evaluation and neuropsychological testing through [facility] within the next 90 days.    Date goal set: 4/17/20  Barriers: Language barrier, poor historian,  history of stroke, memory problems  Strengths: Has support of immediate family, receiving PCA services, appears willing to accept support  Date to achieve by: 7/17/20  Patient expressed understanding of goal: Yes    Action steps to achieve this goal:  1.  My daughter will answer the phone when [facility] contacts her to schedule an appointment.  2.  My daughter will ensure I attend my appointment with [facility] as scheduled.  3.  My daughter will communicate with CHW at [facility].               Advance Care Plans/Directives Type:        My Medical and Care Information  Problem List   Patient Active Problem List   Diagnosis   ? Hypertension   ? Hyperlipidemia   ? Cataract   ? Depression   ? Rotator cuff syndrome of right shoulder   ? Insomnia   ? History of stroke   ? Blind left eye   ? Memory problem      Current Medications and Allergies:  See printed Medication Report.    Care Coordination Start Date: 4/17/2020   Frequency of Care Coordination:     Form Last Updated: 04/17/2020

## 2021-06-20 NOTE — LETTER
Letter by Veronica Gooden BSW at      Author: Veronica Gooden BSW Service: -- Author Type: --    Filed:  Encounter Date: 9/30/2020 Status: (Other)       CARE COORDINATION  [facility]  [address]  [place] [address]    September 30, 2020    Jj Collier Kler  [address]  [place] [address]      Dear Jj,  Your Care Team congratulates you on your journey to maintain wellness. This document will help guide you on your journey to maintain a healthy lifestyle.  You can use this to help you overcome any barriers you may encounter.  If you should have any questions or concerns, you can contact the members of your Care Team or contact your Primary Care Clinic for assistance.    Health Maintenance  Health Maintenance Reviewed:      My Access Plan  Medical Emergency 911   Primary Clinic Line Donald Pavon MD - [phone]   24 Hour Appointment Line [phone] or  [phone] (toll-free)   24 Hour Nurse Line [phone]   Preferred Urgent Care     Preferred Hospital     Preferred Pharmacy [id number] - [place], [place] - [address] [facility] [address] [facility]     Behavioral Health Crisis Line The National Suicide Prevention Lifeline at 1-639.910.8934 or 729     My Care Team Members  Patient Care Team       Relationship Specialty Notifications Start End    Donald Pavon MD PCP - General Family Medicine  6/20/19     Phone: [phone] Fax: [phone]         [address] [place] [address]    Gina Ramírez, RN Lead Care Coordinator Primary Care - CC Admissions 11/14/19     Phone: [phone] Fax: [phone]        Donald Pavon MD Assigned PCP   12/2/19     Phone: [phone] Fax: [phone]         [address] [place] [address] [address] [place] [address]    ALEXANDRA Silverio Worker    5/18/20     [facility]    Phone: [phone]                   Goals        Patient Stated    ? COMPLETED: Mental Health Management 1 (pt-stated)       Goal statement: I would like to be assigned an [facility] worker within the next 60 days.    Date goal set: 4/17/20  Barriers: Language barrier, poor historian, history of stroke, memory problems  Strengths: Has support of immediate family, receiving PCA services, appears willing to accept support  Date to achieve by: 6/17/20  Patient expressed understanding of goal: Yes    Personal Plan:  1. I have been assigned and [facility] worker Nusrat through [facility], [phone].      ? COMPLETED: Mental Health Management 2 (pt-stated)      Goal statement: I would like to schedule an N-648 waiver evaluation and neuropsychological testing through [facility] for [facility] within the next 90 days.    Date goal set: 4/17/20  Barriers: Language barrier, poor historian, history of stroke, memory problems  Strengths: Has support of immediate family, receiving PCA services, appears willing to accept support  Date to achieve by: 7/17/20  Patient expressed understanding of goal: Yes    Personal Plan:  1. I scheduled and attended my [facility] N-648 appointment and will receive a citizenship waiver.  2. I will follow up with my ARM worker regarding my citizenship process.             Advance Care Plans/Directives Type:      We notice that you do not have an Advance Directive on file. Upon completion of your Health Care Directive, please bring a copy with you to your next office visit.    It has been your Clinic Care Team's pleasure to work with you on your goals.    Regards,  Your Clinic Care Team

## 2021-06-21 NOTE — LETTER
Letter by Gina Ramírez RN at      Author: Gina Ramírez RN Service: -- Author Type: --    Filed:  Encounter Date: 3/22/2021 Status: (Other)       March 22, 2021    MARIA DEL CARMEN [facility]  [address]  [place] [address]        Dear Maria Del Carmen:    At [facility], we are dedicated to improving your health and well-being. Enclosed is the Comprehensive Care Plan that we developed with you on 3/12/21. Please review the Care Plan carefully.    As a reminder, some of the things we discussed at your visit include:    Your physical and mental health    Ways to reduce falls    Health care needs you may have    Dont forget to contact your care coordinator if you:    Have been hospitalized or plan to be hospitalized     Have had a fall     Have experienced a change in physical health    Are experiencing emotional problems     If you do not agree with your Care Plan, have questions about it, or have experienced a change in your needs, please call me at [phone]. If you are hearing impaired, please call the [place] [employer] at 048 or [phone] (qdhcxq-ru-jayqrn relay service).    Sincerely,          Gina Ramírez RN    E-mail: [email or web address]  Phone: [phone]    Care Manager  [id number] [place] [id number]     [id number] (11/18)

## 2021-06-21 NOTE — PROGRESS NOTES
"ASSESSMENT and plan   1. Essential hypertension  Blood pressures well controlled continue lisinopril recheck in 3 months    2. Primary insomnia    He has not used mirtazapine for more than 6 weeks she is sleeping well if discontinued the medication and taken out of his medication list.    3. Reactive depression    He is not using the SSRI he says it makes him feel better while he is not taking any no longer feels drowsy or \"\" lethargic.  I would remove the peroxide teen from his list.    4. Anorexia    His daughter mentions his appetite is normal he is eating well he has a bowel movement daily.    5. Chronic cough    Patient has a history of coughing no longer using cetirizine was seen in the ED for bronchitis.  He uses an inhaler periodically with cold air no shortness of breath noted test results discussed with patient.      There are no Patient Instructions on file for this visit.    No orders of the defined types were placed in this encounter.    Medications Discontinued During This Encounter   Medication Reason     PARoxetine (PAXIL) 20 MG tablet Alternate therapy     mirtazapine (REMERON) 7.5 MG tablet Alternate therapy     albuterol (PROAIR HFA;PROVENTIL HFA;VENTOLIN HFA) 90 mcg/actuation inhaler Reorder     lisinopril (PRINIVIL,ZESTRIL) 20 MG tablet Reorder       No Follow-up on file.    CHIEF COMPLAINT:  Chief Complaint   Patient presents with     BP Check       HISTORY OF PRESENT ILLNESS:  Jj is a 68 y.o. male     Who is here for a follow-up for his depression, insomnia and hypertension.  Back in July he had problem with his chronic cough and was seen in the emergency room.  He had an x-ray and blood tests which are proved to be unremarkable.  Currently he is only taking a vitamin and his blood pressure medication because he has stopped all other medications he says he is sleeping well.  He notes no shortness of breath.  And no chest pain.    REVIEW OF SYSTEMS:     According to the patient 10 point review " "of  All other systems are negative.    PFSH:    Lives with his daughter    TOBACCO USE:  Social History     Tobacco Use   Smoking Status Former Smoker     Years: 45.00     Last attempt to quit: 4/1/2015     Years since quitting: 3.6   Smokeless Tobacco Former User       VITALS:  Vitals:    11/27/18 1144   BP: 128/76   Pulse: (!) 56   Resp: 18   Temp: 97.6  F (36.4  C)   TempSrc: Oral   SpO2: 95%   Weight: 122 lb 3 oz (55.4 kg)   Height: 5' 0.75\" (1.543 m)     Wt Readings from Last 3 Encounters:   11/27/18 122 lb 3 oz (55.4 kg)   07/05/18 125 lb (56.7 kg)   06/27/18 125 lb 1 oz (56.7 kg)       PHYSICAL EXAM:    Interactive elderly male sitting in exam room in no acute distress  HEENT neck supple mucous membranes moist no lymph enlargement noted in the neck  Respiratory system clear to auscultation equal breath sounds no wheezes no crackles  CVS regular rate and rhythm no murmurs rubs appreciated  Abdomen soft there is no focal tenderness bowel sounds are absent  Psych oriented x3 seems content not agitated grooming is adequate  DATA REVIEWED:  Additional History from Old Records Summarized (2): 2  Reviewed ED notes from  patient was diagnosed with bronchitis.  Decision to Obtain Records (1): 0  Radiology Tests Summarized or Ordered (1): 1 chest x-ray read as being normal with no evidence of atelectasis  Labs Reviewed or Ordered (1): 1  CMP was unremarkable hemogram was unremarkable.  Medicine Test Summarized or Ordered (1): 0  Independent Review of EKG or X-RAY(2 each): 0    The visit lasted a total of 22 minutes face to face with the patient. Over 50% of the time was spent counseling and educating the patient about hypertension, chronic cough, depression and insomnia    MEDICATIONS:  Current Outpatient Medications   Medication Sig Dispense Refill     albuterol (PROAIR HFA;PROVENTIL HFA;VENTOLIN HFA) 90 mcg/actuation inhaler Inhale 1-2 puffs every 6 (six) hours as needed for wheezing. 1 Inhaler 0     aspirin " 81 MG EC tablet TAKE 1 TABLET BY MOUTH DAILY 90 tablet 1     fluticasone (FLONASE) 50 mcg/actuation nasal spray 2 sprays into each nostril daily. 10 g 11     lisinopril (PRINIVIL,ZESTRIL) 20 MG tablet Take 1 tablet (20 mg total) by mouth daily. 30 tablet 5     omeprazole (PRILOSEC) 20 MG capsule TAKE 1 CAPSULE BY MOUTH EVERY DAY 90 capsule 2     simvastatin (ZOCOR) 20 MG tablet TAKE 1 TABLET(20 MG) BY MOUTH AT BEDTIME 30 tablet 9     albuterol (PROAIR HFA;PROVENTIL HFA;VENTOLIN HFA) 90 mcg/actuation inhaler Inhale 1-2 puffs every 6 (six) hours as needed for wheezing. 1 Inhaler 0     cetirizine (ZYRTEC) 10 MG tablet Take 1 tablet (10 mg total) by mouth daily. 30 tablet 2     clobetasol (TEMOVATE) 0.05 % ointment Apply topically daily as needed. (Patient not taking: Reported on 6/12/2018) 60 g 3     codeine-guaiFENesin (GUAIFENESIN AC)  mg/5 mL liquid Take 5 mL by mouth 3 (three) times a day as needed for cough. 120 mL 0     predniSONE (DELTASONE) 10 mg tablet Take 2 tablets (20 mg total) by mouth daily. 15 tablet 0     triamcinolone (NASACORT) 55 mcg nasal inhaler Use spray per nostril daily 1 Inhaler 12     No current facility-administered medications for this visit.      Data points 4    Please note that this clinical encounter uses voice recognition software, there may be typographical errors present

## 2021-06-23 NOTE — TELEPHONE ENCOUNTER
Provider Refill Request  Medication:  paxil  RN cannot refill this medication per RN refill protocol because medication not on med list        Refill Approved    Rx renewed per Medication Renewal Policy. Medication was last renewed on 11/29/17.    Ov: 11/27/18    Kati Noguera, [facility] Triage/Med Refill 1/11/2019     Requested Prescriptions   Pending Prescriptions Disp Refills     PARoxetine (PAXIL) 20 MG tablet [Pharmacy Med Name: PAROXETINE 20MG TABLETS] 30 tablet 0     Sig: TAKE 1 TABLET(20 MG) BY MOUTH EVERY MORNING    SSRI Refill Protocol  Passed - 1/10/2019  4:18 PM       Passed - PCP or prescribing provider visit in last year    Last office visit with prescriber/PCP: 11/27/2018 Reji Reyes MD OR same dept: 11/27/2018 Reji Reyes MD OR same specialty: 11/27/2018 Reji Reyes MD  Last physical: Visit date not found Last MTM visit: Visit date not found   Next visit within 3 mo: Visit date not found  Next physical within 3 mo: Visit date not found  Prescriber OR PCP: Reji Reyes MD  Last diagnosis associated with med order: 1. Gastroesophageal reflux disease without esophagitis  - omeprazole (PRILOSEC) 20 MG capsule [Pharmacy Med Name: OMEPRAZOLE 20MG CAPSULES]; TAKE 1 CAPSULE BY MOUTH EVERY DAY  Dispense: 90 capsule; Refill: 0    If protocol passes may refill for 12 months if within 3 months of last provider visit (or a total of 15 months).             omeprazole (PRILOSEC) 20 MG capsule [Pharmacy Med Name: OMEPRAZOLE 20MG CAPSULES] 90 capsule 0     Sig: TAKE 1 CAPSULE BY MOUTH EVERY DAY    GI Medications Refill Protocol Passed - 1/10/2019  4:18 PM       Passed - PCP or prescribing provider visit in last 12 or next 3 months.    Last office visit with prescriber/PCP: 11/27/2018 Reji Reyes MD OR same dept: 11/27/2018 Reji Reyes MD OR same specialty: 11/27/2018 Reji Reyes MD  Last physical: Visit date not found Last MTM visit: Visit date not found   Next visit within 3 mo: Visit date not found   Next physical within 3 mo: Visit date not found  Prescriber OR PCP: Reji Reyes MD  Last diagnosis associated with med order: 1. Gastroesophageal reflux disease without esophagitis  - omeprazole (PRILOSEC) 20 MG capsule [Pharmacy Med Name: OMEPRAZOLE 20MG CAPSULES]; TAKE 1 CAPSULE BY MOUTH EVERY DAY  Dispense: 90 capsule; Refill: 0    If protocol passes may refill for 12 months if within 3 months of last provider visit (or a total of 15 months).

## 2021-06-24 NOTE — TELEPHONE ENCOUNTER
Refill Approved    Rx renewed per Medication Renewal Policy. Medication was last renewed on 4/25/18. 1/11/19    Sepideh Allen, [facility] Triage/Med Refill 2/13/2019     Requested Prescriptions   Pending Prescriptions Disp Refills     aspirin 81 MG EC tablet [Pharmacy Med Name: ASPIRIN 81MG EC TABLETS] 90 tablet 0     Sig: TAKE 1 TABLET BY MOUTH DAILY    Aspirin/Dipyridamole Refill Protocol Passed - 2/11/2019 11:50 AM       Passed - PCP or prescribing provider visit in past 12 months      Last office visit with prescriber/PCP: 11/27/2018 Reji Reyes MD OR same dept: 11/27/2018 Reji Reyes MD OR same specialty: 11/27/2018 Reji Reyes MD  Last physical: Visit date not found Last MTM visit: Visit date not found    Next appt within 3 mo: Visit date not found Next physical within 3 mo: Visit date not found  Prescriber OR PCP: Reji Reyes MD  Last diagnosis associated with med order: 1. CVA (cerebral vascular accident) (H)  - aspirin 81 MG EC tablet [Pharmacy Med Name: ASPIRIN 81MG EC TABLETS]; TAKE 1 TABLET BY MOUTH DAILY  Dispense: 90 tablet; Refill: 0    If protocol passes may refill for 6 months if within 3 months of last provider visit (or a total of 9 months).          PARoxetine (PAXIL) 20 MG tablet [Pharmacy Med Name: PAROXETINE 20MG TABLETS] 30 tablet 0     Sig: TAKE 1 TABLET(20 MG) BY MOUTH EVERY MORNING    SSRI Refill Protocol  Passed - 2/11/2019 11:50 AM       Passed - PCP or prescribing provider visit in last year    Last office visit with prescriber/PCP: 11/27/2018 Reji Reyes MD OR same dept: 11/27/2018 Reji Reyes MD OR same specialty: 11/27/2018 Reji Reyes MD  Last physical: Visit date not found Last MTM visit: Visit date not found   Next visit within 3 mo: Visit date not found  Next physical within 3 mo: Visit date not found  Prescriber OR PCP: Reji Reyes MD  Last diagnosis associated with med order: 1. CVA (cerebral vascular accident) (H)  - aspirin 81 MG EC tablet [Pharmacy Med Name:  ASPIRIN 81MG EC TABLETS]; TAKE 1 TABLET BY MOUTH DAILY  Dispense: 90 tablet; Refill: 0    If protocol passes may refill for 12 months if within 3 months of last provider visit (or a total of 15 months).

## 2021-06-24 NOTE — TELEPHONE ENCOUNTER
Refill Approved    Rx renewed per Medication Renewal Policy. Medication was last renewed on 11/27/18.    Sepideh Allen, [facility] Triage/Med Refill 2/14/2019     Requested Prescriptions   Pending Prescriptions Disp Refills     albuterol (PROAIR HFA;PROVENTIL HFA;VENTOLIN HFA) 90 mcg/actuation inhaler [Pharmacy Med Name: ALBUTEROL HFA INH (200 PUFFS) 18GM] 18 g 0     Sig: INHALE 1 TO 2 PUFFS BY MOUTH EVERY 6 HOURS AS NEEDED FOR WHEEZING    Albuterol/Levalbuterol Refill Protocol Passed - 2/12/2019 10:32 PM       Passed - PCP or prescribing provider visit in last year    Last office visit with prescriber/PCP: 11/27/2018 Reji Reyes MD OR same dept: 11/27/2018 Reji Reyes MD OR same specialty: 11/27/2018 Reji Reyes MD Last physical: Visit date not found       Next appt within 3 mo: Visit date not found  Next physical within 3 mo: Visit date not found  Prescriber OR PCP: Reji Reyes MD  Last diagnosis associated with med order: There are no diagnoses linked to this encounter.  If protocol passes may refill for 6 months if within 3 months of last provider visit (or a total of 9 months). If patient requesting >1 inhaler per month refill x 6 months and have patient make appointment with provider.

## 2021-06-25 NOTE — PROGRESS NOTES
ASSESSMENT AND PLAN:  1. Pure hyperglyceridemia  On a statin no side effects recheck lipid level at next visit.    2. Hypotension, unspecified hypotension type  He still taking lisinopril have stopped the medication because his diastolic blood pressures below 90 admits that he has been dizzy and feeling some episodic chest pain checking a BMP and a hemogram today.  - HM1(CBC and Differential)  - Basic Metabolic Panel  - HM1 (CBC with Diff)    3. Other rhinitis  Taking his Flonase no rhinitis noted    4. Reactive depression  Sleeping well not agitated no symptoms of overt depression noted on paroxetine            Orders Placed This Encounter   Procedures     Basic Metabolic Panel     HM1 (CBC with Diff)     There are no discontinued medications.    Return in about 1 week (around 3/27/2019).    CHIEF COMPLAINT:  Chief Complaint   Patient presents with     BP Check       HISTORY OF PRESENT ILLNESS:  Jj is a 69 y.o. male with hypertension, hyperlipidemia, history of stroke, depression, insomnia, anorexia, and chronic cough, who presents to the clinic today with his daughter for blood pressure check. Jj is present with a Yolie . He continues on lisinopril faithfully. His blood pressure is on the lower side today. The patient reports dizziness and stiff neck, the former especially with positional changes. He is having some throbbing muscle pain. They have not been checking his blood pressure outside of the clinic. His breathing has been okay though Jj is using his albuterol inhaler at least once daily as needed for shortness of breath. He is walking around his house, and does not like to be accompanied. His daughter has not noticed any falls this year. His mood has been good on Paxil. The patient does not talk much at home, but is more apt to do so on medication. He tends to watch TV at home.    REVIEW OF SYSTEMS:   Respiratory: Shortness of breath once daily.  Musculoskeletal: Stiff neck. Throbbing muscle  "pain.  Neuro: Dizziness.   Mental: No worsening depression.  All other systems are negative.    PFSH:  Reviewed as below.     TOBACCO USE:  Social History     Tobacco Use   Smoking Status Former Smoker     Years: 45.00     Last attempt to quit: 4/1/2015     Years since quitting: 3.9   Smokeless Tobacco Former User       VITALS:  Vitals:    03/20/19 1559   BP: (!) 86/62   Pulse: 86   Resp: 20   Temp: 97.7  F (36.5  C)   TempSrc: Oral   SpO2: 96%   Weight: 125 lb (56.7 kg)   Height: 5' 0.75\" (1.543 m)     Wt Readings from Last 3 Encounters:   03/20/19 125 lb (56.7 kg)   11/27/18 122 lb 3 oz (55.4 kg)   07/05/18 125 lb (56.7 kg)     Body mass index is 23.81 kg/m .    PHYSICAL EXAM:  General: Alert, cooperative, no distress, appears stated age  HEENT: Normocephalic, without obvious abnormality, atraumatic, moist mucous membranes  Eyes: PERRL, conjunctiva/cornea clear, EOM's intact  Lungs: Clear to auscultation bilaterally, respirations unlabored  Heart: Regular rate and rhythm, S1 and S2 normal, no murmur, rub, or gallop  Neurologic:  A & O x 3.  No tremor, no focal findings.  Uses cane for assistance with ambulation.  Psych oriented x3 not agitated well-groomed comfortable at rest  DATA REVIEWED:  Additional History from Old Records Summarized (2): none  Decision to Obtain Records (1): none  Radiology Tests Summarized or Ordered (1): none  Labs Reviewed or Ordered (1): BMP and CBC with differential ordered today.  Medicine Test Summarized or Ordered (1): none  Independent Review of EKG or X-RAY(2 each): none    IMee, am scribing for and in the presence of, Dr. Reyes.    I, Dr. Reyes, personally performed the services described in this documentation, as scribed by Mee Ramirez in my presence, and it is both accurate and complete.      MEDICATIONS:  Current Outpatient Medications   Medication Sig Dispense Refill     albuterol (PROAIR HFA;PROVENTIL HFA;VENTOLIN HFA) 90 mcg/actuation inhaler INHALE 1 TO 2 PUFFS BY " MOUTH EVERY 6 HOURS AS NEEDED FOR WHEEZING 18 g 4     aspirin 81 MG EC tablet TAKE 1 TABLET BY MOUTH DAILY 90 tablet 1     fluticasone (FLONASE) 50 mcg/actuation nasal spray 2 sprays into each nostril daily. 10 g 11     lisinopril (PRINIVIL,ZESTRIL) 20 MG tablet Take 1 tablet (20 mg total) by mouth daily. 30 tablet 5     omeprazole (PRILOSEC) 20 MG capsule Take 1 capsule (20 mg total) by mouth daily. 90 capsule 2     PARoxetine (PAXIL) 20 MG tablet TAKE 1 TABLET(20 MG) BY MOUTH EVERY MORNING 90 tablet 2     simvastatin (ZOCOR) 20 MG tablet TAKE 1 TABLET(20 MG) BY MOUTH AT BEDTIME 30 tablet 9     albuterol (PROAIR HFA;PROVENTIL HFA;VENTOLIN HFA) 90 mcg/actuation inhaler Inhale 1-2 puffs every 6 (six) hours as needed for wheezing. 1 Inhaler 0     cetirizine (ZYRTEC) 10 MG tablet Take 1 tablet (10 mg total) by mouth daily. 30 tablet 2     clobetasol (TEMOVATE) 0.05 % ointment Apply topically daily as needed. (Patient not taking: Reported on 6/12/2018) 60 g 3     codeine-guaiFENesin (GUAIFENESIN AC)  mg/5 mL liquid Take 5 mL by mouth 3 (three) times a day as needed for cough. 120 mL 0     predniSONE (DELTASONE) 10 mg tablet Take 2 tablets (20 mg total) by mouth daily. 15 tablet 0     triamcinolone (NASACORT) 55 mcg nasal inhaler Use spray per nostril daily 1 Inhaler 12     No current facility-administered medications for this visit.      Total amount time spent today with the patient was 25 minutes, more than 50% of this time was spent discussing hypotension, depression, rhinitis, myalgia.  Total Data Points: 1    Please note that this clinical encounter uses voice recognition software, there may be typographical errors present

## 2021-06-29 NOTE — PROGRESS NOTES
Progress Notes by Veronica Gooden BSW at 4/17/2020  9:00 AM     Author: Veronica Gooden BSW Service: -- Author Type:     Filed: 4/17/2020  5:07 PM Encounter Date: 4/17/2020 Status: Signed    : Veronica Gooden BSW ()       Clinic Care Coordination Contact    Clinic Care Coordination Contact  OUTREACH     Visit Note:    Present for today's phone visit is pt's daughter (Jack Palmer), inter #[id number], and [facility] SW.    Jj Rios is a 69yo male who was referred by PCP for [facility] assessment as the pt needs assistance navigating the citizenship process.     He currently lives with his daughter, son-in-law, 2 grandchildren, and a nephew. He and his wife arrived to the [place] in [place]. His wife passed away shortly thereafter in 2016. He is receiving $780/month in SSI benefits.    He is on the Elderly Waiver (EW) and is approved for 4 hours/day of PCA services. These services are provided by his daughter, Jack Palmer, who is employed through [employer]. Information has been added to Care Team tab.     He [facility] for an N-648 waiver evaluation and neuropsychological testing by PCP earlier this month. His daughter states that they have not yet been contacted to schedule this appointment. SW explained that delay in scheduling is due to the current COVID-19 outbreak. She understands that they will contact her directly to schedule this once precautions have been lifted.     For additional support navigating the citizenship process, a referral for [facility] was submitted to [facility] on 4/17/20. His daughter is aware of this and understands that they will contact her sometime next week to schedule an initial diagnostic assessment. Once this is completed, a worker will be assigned.    Plan:  1.) See goals.  2.) Begin scheduled outreach.    Referral Information:  Referral Source: PCP  Primary Diagnosis:  Psychosocial    Chief Complaint   Patient presents with   ? Clinic Care Coordination - Initial     Clinic Utilization  Difficulty keeping appointments:: No  Compliance Concerns: No  No-Show Concerns: No  No PCP office visit in Past Year: No     Utilization    Last refreshed: 4/17/2020  9:50 AM:  Hospital Admissions 0           Last refreshed: 4/17/2020  9:50 AM:  ED Visits 0           Last refreshed: 4/17/2020  9:50 AM:  No Show Count (past year) 0              Current as of: 4/17/2020  9:50 AM            Clinical Concerns:  Current Medical Concerns: See Problem List    Current Behavioral Concerns: See Problem List    Pain  Pain (GOAL):: No  Health Maintenance Reviewed: Not assessed  Clinical Pathway: None     Medication Management: Pt's daughter/PCA reports that he is taking all medications as prescribed; she sets them up in a pill box weekly and provides medication reminders.     Functional Status:  Dependent ADLs:: Ambulation-cane, Bathing, Dressing, Eating, Grooming, Incontinence, Positioning, Transfers, Toileting  Dependent IADLs:: Cleaning, Cooking, Laundry, Shopping, Meal Preparation, Medication Management, Money Management, Transportation, Incontinence  Bed or wheelchair confined:: No  Mobility Status: Independent w/Device  Fallen 2 or more times in the past year?: No  Any fall with injury in the past year?: No    Living Situation:  Current living arrangement:: I live in a private home with family  Type of residence:: Apartment    Lifestyle & Psychosocial Needs:  Lifestyle   ? Physical activity     Days per week: 5 days     Minutes per session: 30 min   ? Stress: Only a little     Social Needs   ? Financial resource strain: Not very hard   ? Food insecurity     Worry: Never true     Inability: Never true   ? Transportation needs     Medical: No     Non-medical: No     Diet:: Regular  Inadequate nutrition (GOAL):: No  Tube Feeding: No  Inadequate activity/exercise (GOAL):: No  Significant changes in sleep  pattern (GOAL): No  Transportation means:: Family     Gnosticism or spiritual beliefs that impact treatment:: No  Informal Support system:: Family, Children     Socioeconomic History   ? Marital status:      Spouse name: Not on file   ? Number of children: 5   ? Years of education: Not on file   ? Highest education level: Not on file   Relationships   ? Social connections     Talks on phone: More than three times a week     Gets together: More than three times a week     Attends Anabaptist service: More than 4 times per year     Active member of club or organization: Yes     Attends meetings of clubs or organizations: More than 4 times per year     Relationship status:    ? Intimate partner violence     Fear of current or ex partner: No     Emotionally abused: No     Physically abused: No     Forced sexual activity: No     Tobacco Use   ? Smoking status: Former Smoker     Years: 45.00     Last attempt to quit: 2015     Years since quittin.0   ? Smokeless tobacco: Former User   Substance and Sexual Activity   ? Alcohol use: No     Frequency: Never     Binge frequency: Never   ? Drug use: No   ? Sexual activity: Not Currently     Partners: Female     Community Resources: [facility], PCA  Supplies used at home:: None    Advance Care Plan/Directive  Advanced Care Plans/Directives on file:: No  Advanced Care Plan/Directive Status: Considering Options    Referrals Placed: Mental Health    Goals        Patient Stated    ? Mental Health Management 1 (pt-stated)      Goal statement: I would like to be assigned an [facility] worker within the next 60 days.    Date goal set: 20  Barriers: Language barrier, poor historian, history of stroke, memory problems  Strengths: Has support of immediate family, receiving PCA services, appears willing to accept support  Date to achieve by: 20  Patient expressed understanding of goal: Yes    Action steps to achieve this goal:  1.  [facility] submitted [facility] and in-home  therapy referral to [facility] on 4/16/20.  2.  My daughter will answer the phone when [facility] contacts her to schedule my initial diagnostic assessment.  3.  My daughter will learn the name and phone number of my [facility] worker once assigned and provide this information to [facility] at outreach.       ? Mental Health Management 2 (pt-stated)      Goal statement: I would like to schedule an N-648 waiver evaluation and neuropsychological testing through [facility] within the next 90 days.    Date goal set: 4/17/20  Barriers: Language barrier, poor historian, history of stroke, memory problems  Strengths: Has support of immediate family, receiving PCA services, appears willing to accept support  Date to achieve by: 7/17/20  Patient expressed understanding of goal: Yes    Action steps to achieve this goal:  1.  My daughter will answer the phone when [facility] contacts her to schedule an appointment.  2.  My daughter will ensure I attend my appointment with [facility] as scheduled.  3.  My daughter will communicate with [facility] at [facility].          Future Appointments              In 1 month Donald Pavon MD [facility]

## 2021-07-04 NOTE — ADDENDUM NOTE
Addendum Note by Donald Pavon MD at 3/23/2021  1:08 PM     Author: Donald Pavon MD Service: -- Author Type: Physician    Filed: 3/23/2021  1:08 PM Encounter Date: 3/20/2021 Status: Signed    : Donald Pavon MD (Physician)    Addended by: DONALD PAVON on: 3/23/2021 01:08 PM        Modules accepted: Orders

## 2021-07-23 ENCOUNTER — PATIENT OUTREACH (OUTPATIENT)
Dept: GERIATRIC MEDICINE | Facility: CLINIC | Age: 71
End: 2021-07-23

## 2021-07-23 NOTE — PROGRESS NOTES
[facility] Care Coordination Contact    Internal CC change effective 8/1/2021.  Mailed member CC Change letter.  Additional tasks to be completed by CMS include: update database & EPIC, enter CC Change in MMIS, and move member files on Q drive.  Antonietta Tripathi  Case Management Specialist  [facility]  [phone]

## 2021-07-23 NOTE — LETTER
July 23, 2021    MARIA DEL CARMEN HYDE  [address]   [place] [address]      Dear Maria Del Carmen:    As a member of [facility] ([facility] you are provided a care coordinator. I will be your new care coordinator as of 8/1/2021. I will be calling you soon to see how you are doing and determine your needs.    If you have any questions, please feel free to call me at  [phone]. If you reach my voice mail, please leave a message and your phone number. If you are hearing impaired, please call the [place] Relay at 090 or [phone] (kiplxa-ui-zqlocp relay service).    I look forward to speaking with you soon.    Sincerely,      CJ Bazan          [facility]  [id number] DHS Approved ([id number])  [id number] (11/18)

## 2021-09-09 ENCOUNTER — PATIENT OUTREACH (OUTPATIENT)
Dept: GERIATRIC MEDICINE | Facility: CLINIC | Age: 71
End: 2021-09-09

## 2021-09-09 NOTE — PROGRESS NOTES
[facility] Care Coordination Contact    Care coordinator called Kirill Aguero  to schedule 6 month phone call for Jj. Kirill Aguero confirmed 9/16 @ 9am to complete 6 month phone call.   CJ Bazan  [facility]  Cell: [phone]          
Patent

## 2021-09-16 ENCOUNTER — PATIENT OUTREACH (OUTPATIENT)
Dept: GERIATRIC MEDICINE | Facility: CLINIC | Age: 71
End: 2021-09-16

## 2021-09-16 NOTE — PROGRESS NOTES
[facility] Care Coordination Contact      [facility] Six-Month Telephone Assessment    6 month telephone assessment completed on 9/16/2021.    ER visits: No  Hospitalizations: No  TCU stays: No  Significant health status changes: Jj reports that his health has been stable the past six months no hospialitzations or ED visits. No concerns to report at this time.   Falls/Injuries: No  ADL/IADL changes: No  Changes in services: No    Caregiver Assessment follow up:  NA    Goals: See POC in chart for goal progress documentation.  Jj reports that he has a doctor appointment scheduled for next week to obtain his COVID vaccine so that he is able to return back to adult day care. Jj reports that he continues to use his cane/and or physical assistance with all mobility. Jj reports that he hasn't had any falls in the past 6 months to report. No further services or equipment needs requested at this time.     Will see member in 6 months for an annual health risk assessment.   Encouraged member to call CC with any questions or concerns in the meantime.  CJ Bazan  [facility]  Cell: [phone]

## 2021-09-17 ENCOUNTER — OFFICE VISIT (OUTPATIENT)
Dept: FAMILY MEDICINE | Facility: CLINIC | Age: 71
End: 2021-09-17
Payer: COMMERCIAL

## 2021-09-17 VITALS
WEIGHT: 120.38 LBS | TEMPERATURE: 97.1 F | SYSTOLIC BLOOD PRESSURE: 114 MMHG | RESPIRATION RATE: 16 BRPM | OXYGEN SATURATION: 97 % | HEIGHT: 60 IN | HEART RATE: 56 BPM | DIASTOLIC BLOOD PRESSURE: 68 MMHG | BODY MASS INDEX: 23.63 KG/M2

## 2021-09-17 DIAGNOSIS — I63.59 CEREBROVASCULAR ACCIDENT (CVA) DUE TO OCCLUSION OF OTHER CEREBRAL ARTERY (H): ICD-10-CM

## 2021-09-17 DIAGNOSIS — K21.9 GASTROESOPHAGEAL REFLUX DISEASE WITHOUT ESOPHAGITIS: ICD-10-CM

## 2021-09-17 DIAGNOSIS — Z76.0 ENCOUNTER FOR MEDICATION REFILL: Primary | ICD-10-CM

## 2021-09-17 DIAGNOSIS — F32.1 CURRENT MODERATE EPISODE OF MAJOR DEPRESSIVE DISORDER, UNSPECIFIED WHETHER RECURRENT (H): ICD-10-CM

## 2021-09-17 DIAGNOSIS — E78.1 PURE HYPERGLYCERIDEMIA: ICD-10-CM

## 2021-09-17 PROCEDURE — 90471 IMMUNIZATION ADMIN: CPT | Performed by: FAMILY MEDICINE

## 2021-09-17 PROCEDURE — 90662 IIV NO PRSV INCREASED AG IM: CPT | Performed by: FAMILY MEDICINE

## 2021-09-17 PROCEDURE — 99214 OFFICE O/P EST MOD 30 MIN: CPT | Mod: 25 | Performed by: FAMILY MEDICINE

## 2021-09-17 RX ORDER — SIMVASTATIN 20 MG
TABLET ORAL
Qty: 90 TABLET | Refills: 3 | Status: SHIPPED | OUTPATIENT
Start: 2021-09-17 | End: 2022-10-20

## 2021-09-17 RX ORDER — PAROXETINE 40 MG/1
TABLET, FILM COATED ORAL
Qty: 30 TABLET | Refills: 11 | Status: SHIPPED | OUTPATIENT
Start: 2021-09-17 | End: 2022-10-21

## 2021-09-17 ASSESSMENT — MIFFLIN-ST. JEOR: SCORE: 1147.33

## 2021-09-17 NOTE — PROGRESS NOTES
ASSESSMENT and plan   1. Current moderate episode of major depressive disorder, unspecified whether recurrent (H)    Patient reports when he takes his medication he does not feel tired he ran out of it 2 days no mood changes have been noted refilled medication for a year.  - PARoxetine (PAXIL) 40 MG tablet; [PAROXETINE (PAXIL) 40 MG TABLET] TAKE 1 TABLET(40 MG) BY MOUTH EVERY MORNING  Dispense: 30 tablet; Refill: 11    2. CVA (cerebral vascular accident) (H)    Patient is relatively active no new pain noted no new numbness noted    3. Encounter for medication refill      - aspirin (ASA) 81 MG EC tablet; [ASPIRIN 81 MG EC TABLET] TAKE 1 TABLET BY MOUTH DAILY  Dispense: 90 tablet; Refill: 3    4. Pure hyperglyceridemia    He needs to have his cholesterol checked suggested that his daughter schedule physical  - simvastatin (ZOCOR) 20 MG tablet; [SIMVASTATIN (ZOCOR) 20 MG TABLET] TAKE 1 TABLET(20 MG) BY MOUTH AT BEDTIME  Dispense: 90 tablet; Refill: 3    5. Gastroesophageal reflux disease without esophagitis    Omeprazole refilled no side effects with medication previously  - omeprazole (PRILOSEC) 20 MG DR capsule; [OMEPRAZOLE (PRILOSEC) 20 MG CAPSULE] TAKE 1 CAPSULE(20 MG) BY MOUTH DAILY  Dispense: 90 capsule; Refill: 3        6.  Vaccination for influenza  Patient agrees for vaccination today.    There are no Patient Instructions on file for this visit.    Orders Placed This Encounter   Procedures     SD FLU VACCINE, INCREASED ANTIGEN, PRESV FREE     Medications Discontinued During This Encounter   Medication Reason     aspirin 81 MG EC tablet Reorder     PARoxetine (PAXIL) 40 MG tablet Reorder     simvastatin (ZOCOR) 20 MG tablet Reorder     omeprazole (PRILOSEC) 20 MG capsule Reorder       Return in about 3 months (around 12/17/2021) for depression.    CHIEF COMPLAINT:  chief complaint    HISTORY OF PRESENT ILLNESS:  Jj is a 71 year old male who is here for a follow-up accompanied by his daughter.  His medical  "history significant for depression, CVA causing weakness, gastritis.  He has been out of some of his medications for 2 days his daughter reports his appetites been normal he still has access to his omeprazole.  He does not note any change in his sleep pattern.    REVIEW OF SYSTEMS:     According to patient 10 point review of  All other systems are negative.    PFSH:      TOBACCO USE:  History   Smoking Status     Former Smoker     Years: 45.00     Quit date: 4/1/2015   Smokeless Tobacco     Former User       VITALS:  Vitals:    09/17/21 0931   BP: 114/68   Pulse: 56   Resp: 16   Temp: 97.1  F (36.2  C)   TempSrc: Oral   SpO2: 97%   Weight: 54.6 kg (120 lb 6 oz)   Height: 1.53 m (5' 0.24\")     Wt Readings from Last 3 Encounters:   09/17/21 54.6 kg (120 lb 6 oz)   02/16/21 54.5 kg (120 lb 2 oz)   11/12/20 54.1 kg (119 lb 5 oz)       PHYSICAL EXAM:   active elderly male sitting in exam room no acute distress  HEENT neck supple mucous members moist, oral cavity shows no exudate no erythema  Respiratory system clear to auscultation equal breath sounds no wheezes no crackles  Abdomen soft there is no focal tenderness bowel sounds are present no hepatosplenomegaly  Psych oriented x3 not agitated grooming is adequate  DATA REVIEWED:  Additional History from Old Records Summarized (2): 0  Decision to Obtain Records (1): 0  Radiology Tests Summarized or Ordered (1): 0  Labs Reviewed or Ordered (1): 0  Medicine Test Summarized or Ordered (1): 0  Independent Review of EKG or X-RAY(2 each): 0    The visit lasted a total of 30 minutes.    MEDICATIONS:  Current Outpatient Medications   Medication Sig Dispense Refill     aspirin (ASA) 81 MG EC tablet [ASPIRIN 81 MG EC TABLET] TAKE 1 TABLET BY MOUTH DAILY 90 tablet 3     omeprazole (PRILOSEC) 20 MG DR capsule [OMEPRAZOLE (PRILOSEC) 20 MG CAPSULE] TAKE 1 CAPSULE(20 MG) BY MOUTH DAILY 90 capsule 3     PARoxetine (PAXIL) 40 MG tablet [PAROXETINE (PAXIL) 40 MG TABLET] TAKE 1 TABLET(40 " MG) BY MOUTH EVERY MORNING 30 tablet 11     simvastatin (ZOCOR) 20 MG tablet [SIMVASTATIN (ZOCOR) 20 MG TABLET] TAKE 1 TABLET(20 MG) BY MOUTH AT BEDTIME 90 tablet 3     albuterol (PROAIR HFA;PROVENTIL HFA;VENTOLIN HFA) 90 mcg/actuation inhaler [ALBUTEROL (PROAIR HFA;PROVENTIL HFA;VENTOLIN HFA) 90 MCG/ACTUATION INHALER] INHALE 1 TO 2 PUFFS BY MOUTH EVERY 6 HOURS AS NEEDED FOR WHEEZING 18 g 5     blood-gluc,BP meter,adult cuff Miguelina [BLOOD-GLUC,BP METER,ADULT CUFF MIGUELINA] Check BP daily 1 Device 0     clobetasoL (TEMOVATE) 0.05 % ointment [CLOBETASOL (TEMOVATE) 0.05 % OINTMENT] Apply thin layer to affected area twice a day 30 g 0     diaper,brief,adult,disposable Misc [DIAPER,BRIEF,ADULT,DISPOSABLE MISC] Use 2-3 a day as needed 80 each 5     fluticasone propionate (FLONASE) 50 mcg/actuation nasal spray [FLUTICASONE PROPIONATE (FLONASE) 50 MCG/ACTUATION NASAL SPRAY] SHAKE LIQUID AND USE 2 SPRAYS IN EACH NOSTRIL DAILY 48 g 3     hydrOXYzine HCL (ATARAX) 25 MG tablet [HYDROXYZINE HCL (ATARAX) 25 MG TABLET] TAKE 1 TABLET(25 MG) BY MOUTH AT BEDTIME AS NEEDED FOR ITCHING 90 tablet 3     lanolin alcohol-mo-w.pet-ceres (EUCERIN) Crea [LANOLIN ALCOHOL-MO-W.PET-CERES (EUCERIN) CREA] Apply thin layer to affected area twice a day  0     loratadine (CLARITIN) 10 mg tablet [LORATADINE (CLARITIN) 10 MG TABLET] TAKE 1 TABLET(10 MG) BY MOUTH DAILY 90 tablet 2

## 2021-09-21 ENCOUNTER — TRANSFERRED RECORDS (OUTPATIENT)
Dept: HEALTH INFORMATION MANAGEMENT | Facility: CLINIC | Age: 71
End: 2021-09-21

## 2021-09-21 ENCOUNTER — OFFICE VISIT (OUTPATIENT)
Dept: FAMILY MEDICINE | Facility: CLINIC | Age: 71
End: 2021-09-21
Payer: COMMERCIAL

## 2021-09-21 VITALS
DIASTOLIC BLOOD PRESSURE: 86 MMHG | HEART RATE: 64 BPM | RESPIRATION RATE: 16 BRPM | OXYGEN SATURATION: 93 % | BODY MASS INDEX: 23.47 KG/M2 | HEIGHT: 60 IN | WEIGHT: 119.56 LBS | TEMPERATURE: 98 F | SYSTOLIC BLOOD PRESSURE: 164 MMHG

## 2021-09-21 DIAGNOSIS — Z86.73 HISTORY OF STROKE: ICD-10-CM

## 2021-09-21 DIAGNOSIS — F32.1 CURRENT MODERATE EPISODE OF MAJOR DEPRESSIVE DISORDER, UNSPECIFIED WHETHER RECURRENT (H): Primary | ICD-10-CM

## 2021-09-21 DIAGNOSIS — I10 ESSENTIAL HYPERTENSION: ICD-10-CM

## 2021-09-21 DIAGNOSIS — R41.3 MEMORY PROBLEM: ICD-10-CM

## 2021-09-21 DIAGNOSIS — I63.59 CEREBROVASCULAR ACCIDENT (CVA) DUE TO OCCLUSION OF OTHER CEREBRAL ARTERY (H): ICD-10-CM

## 2021-09-21 DIAGNOSIS — H54.40 BLINDNESS OF LEFT EYE, UNSPECIFIED RIGHT EYE VISUAL IMPAIRMENT CATEGORY: ICD-10-CM

## 2021-09-21 LAB
ALBUMIN SERPL-MCNC: 3.5 G/DL (ref 3.5–5)
ALP SERPL-CCNC: 97 U/L (ref 45–120)
ALT SERPL W P-5'-P-CCNC: 11 U/L (ref 0–45)
ANION GAP SERPL CALCULATED.3IONS-SCNC: 11 MMOL/L (ref 5–18)
AST SERPL W P-5'-P-CCNC: 18 U/L (ref 0–40)
BILIRUB SERPL-MCNC: 0.3 MG/DL (ref 0–1)
BUN SERPL-MCNC: 19 MG/DL (ref 8–28)
CALCIUM SERPL-MCNC: 9.7 MG/DL (ref 8.5–10.5)
CHLORIDE BLD-SCNC: 104 MMOL/L (ref 98–107)
CO2 SERPL-SCNC: 23 MMOL/L (ref 22–31)
CREAT SERPL-MCNC: 1.36 MG/DL (ref 0.7–1.3)
GFR SERPL CREATININE-BSD FRML MDRD: 52 ML/MIN/1.73M2
GLUCOSE BLD-MCNC: 101 MG/DL (ref 70–125)
POTASSIUM BLD-SCNC: 4 MMOL/L (ref 3.5–5)
PROT SERPL-MCNC: 8.3 G/DL (ref 6–8)
SODIUM SERPL-SCNC: 138 MMOL/L (ref 136–145)

## 2021-09-21 PROCEDURE — 80053 COMPREHEN METABOLIC PANEL: CPT | Performed by: FAMILY MEDICINE

## 2021-09-21 PROCEDURE — 99214 OFFICE O/P EST MOD 30 MIN: CPT | Performed by: FAMILY MEDICINE

## 2021-09-21 PROCEDURE — 36415 COLL VENOUS BLD VENIPUNCTURE: CPT | Performed by: FAMILY MEDICINE

## 2021-09-21 RX ORDER — LISINOPRIL 20 MG/1
20 TABLET ORAL DAILY
Qty: 30 TABLET | Refills: 0 | Status: SHIPPED | OUTPATIENT
Start: 2021-09-21 | End: 2021-10-18

## 2021-09-21 ASSESSMENT — PATIENT HEALTH QUESTIONNAIRE - PHQ9: SUM OF ALL RESPONSES TO PHQ QUESTIONS 1-9: 11

## 2021-09-21 ASSESSMENT — MIFFLIN-ST. JEOR: SCORE: 1143.64

## 2021-09-21 NOTE — PROGRESS NOTES
ASSESMENT AND PLAN:  Diagnoses and all orders for this visit:  Current moderate episode of major depressive disorder, unspecified whether recurrent (H)  Continue Paxil.  No suicidal or homicidal ideations.    Memory problem  We will consider referral to psychology for citizenship waiver again.  We await further information from [facility] psychology and wellness center first.    Essential hypertension  Controlled.  Added lisinopril.  - lisinopril (ZESTRIL) 20 MG tablet; Take 1 tablet (20 mg) by mouth daily  - Comprehensive metabolic panel (BMP + Alb, Alk Phos, ALT, AST, Total. Bili, TP)    History of stroke  Remote history of stroke.  No acute symptoms.    Blindness of left eye, unspecified right eye visual impairment category        This transcription uses voice recognition software, which may contain typographical errors.    SUBJECTIVE: Jj Rios is here for follow up.  He was last seen by Dr. Reyes last week.  Depression medication was restarted.  States he is doing well with the medication.  No suicidal or homicidal ideations.  He was on medication for high blood pressure in the past.  Currently not taking any medication.  He was referred to dermatology for extreme dry skin on hands.  Was seen once by dermatology, note reviewed.  He was recommended to soak hands with warm water for 5 minutes every night and apply clobetasol cream.  Daughter states he is doing that.  No significant improvement.  He was referred to psychology for citizenship waiver last year and was provided waiver form.  But they received a letter from immigration stating they needed update waiver form.  They went and saw psychologist this morning to update the form but was told that he is no longer qualify for the waiver. (I have not heard from the psychologist.)      Past Medical History:   Diagnosis Date     Anorexia      Cerebral vascular accident (H) 12/2014     CVA (cerebrovascular accident) (H)      Depression      GERD  "(gastroesophageal reflux disease)      High cholesterol      HLD (hyperlipidemia)      Hypertension      Insomnia      Patient Active Problem List   Diagnosis     Hypertension     Hyperlipidemia     Cataract     Depression     Rotator cuff syndrome of right shoulder     Insomnia     History of stroke     Blind left eye     Memory problem     Urinary incontinence, unspecified type       Allergies:  No Known Allergies    History   Smoking Status     Former Smoker     Years: 45.00     Quit date: 4/1/2015   Smokeless Tobacco     Former User       Review of systems otherwise negative except as listed in HPI.   History   Smoking Status     Former Smoker     Years: 45.00     Quit date: 4/1/2015   Smokeless Tobacco     Former User       OBJECTICE: BP (!) 164/86   Pulse 64   Temp 98  F (36.7  C) (Oral)   Resp 16   Ht 1.53 m (5' 0.24\")   Wt 54.2 kg (119 lb 9 oz)   SpO2 93%   BMI 23.16 kg/m      DATA REVIEWED:  Additional History from Old Records Summarized (2):   Labs Reviewed or Ordered (1):       GEN-alert,  in no apparent distress.  HEENT- neck is supple.  CV-regular rate and rhythm with no murmur.   RESP-lungs clear to auscultation .  EXTREM- No edema.  SKIN- Dry skin both hands, Unchanged.         Donald Pavon MD   9/21/2021   "

## 2021-09-23 ENCOUNTER — TELEPHONE (OUTPATIENT)
Dept: FAMILY MEDICINE | Facility: CLINIC | Age: 71
End: 2021-09-23

## 2021-09-23 NOTE — TELEPHONE ENCOUNTER
Spoke with patient's daughter. Informed about patient's lab results and follow up appointment day and time.     Pauline BETTENCOURT RN

## 2021-10-17 DIAGNOSIS — I10 ESSENTIAL HYPERTENSION: ICD-10-CM

## 2021-10-18 RX ORDER — LISINOPRIL 20 MG/1
TABLET ORAL
Qty: 30 TABLET | Refills: 11 | Status: SHIPPED | OUTPATIENT
Start: 2021-10-18 | End: 2022-12-21

## 2021-10-18 NOTE — TELEPHONE ENCOUNTER
"Routing refill request to provider for review/approval because:  Labs out of range:  Multiple     Last Written Prescription Date:  9/21/21  Last Fill Quantity: 30,  # refills: 0   Last office visit provider:  9/21/21     Requested Prescriptions   Pending Prescriptions Disp Refills     lisinopril (ZESTRIL) 20 MG tablet [Pharmacy Med Name: LISINOPRIL 20MG TABLETS] 30 tablet 0     Sig: TAKE 1 TABLET(20 MG) BY MOUTH DAILY       ACE Inhibitors (Including Combos) Protocol Failed - 10/17/2021  5:16 AM        Failed - Blood pressure under 140/90 in past 12 months     BP Readings from Last 3 Encounters:   09/21/21 (!) 164/86   09/17/21 114/68   02/16/21 122/74                 Failed - Normal serum creatinine on file in past 12 months     Recent Labs   Lab Test 09/21/21  1722   CR 1.36*       Ok to refill medication if creatinine is low          Passed - Recent (12 mo) or future (30 days) visit within the authorizing provider's specialty     Patient has had an office visit with the authorizing provider or a provider within the authorizing providers department within the previous 12 mos or has a future within next 30 days. See \"Patient Info\" tab in inbasket, or \"Choose Columns\" in Meds & Orders section of the refill encounter.              Passed - Medication is active on med list        Passed - Patient is age 18 or older        Passed - Normal serum potassium on file in past 12 months     Recent Labs   Lab Test 09/21/21  1722   POTASSIUM 4.0                  Hoang Soriano RN 10/18/21 7:36 AM  "

## 2021-10-26 ENCOUNTER — TELEPHONE (OUTPATIENT)
Dept: FAMILY MEDICINE | Facility: CLINIC | Age: 71
End: 2021-10-26

## 2021-10-26 ENCOUNTER — OFFICE VISIT (OUTPATIENT)
Dept: FAMILY MEDICINE | Facility: CLINIC | Age: 71
End: 2021-10-26
Payer: COMMERCIAL

## 2021-10-26 VITALS
BODY MASS INDEX: 22.74 KG/M2 | HEIGHT: 60 IN | SYSTOLIC BLOOD PRESSURE: 136 MMHG | OXYGEN SATURATION: 95 % | WEIGHT: 115.8 LBS | DIASTOLIC BLOOD PRESSURE: 76 MMHG | TEMPERATURE: 98.1 F | HEART RATE: 55 BPM

## 2021-10-26 DIAGNOSIS — F32.A DEPRESSION, UNSPECIFIED DEPRESSION TYPE: ICD-10-CM

## 2021-10-26 DIAGNOSIS — R41.3 MEMORY PROBLEM: ICD-10-CM

## 2021-10-26 DIAGNOSIS — I10 ESSENTIAL HYPERTENSION: Primary | ICD-10-CM

## 2021-10-26 DIAGNOSIS — I63.59 CEREBROVASCULAR ACCIDENT (CVA) DUE TO OCCLUSION OF OTHER CEREBRAL ARTERY (H): ICD-10-CM

## 2021-10-26 DIAGNOSIS — H54.40 BLINDNESS OF LEFT EYE, UNSPECIFIED RIGHT EYE VISUAL IMPAIRMENT CATEGORY: ICD-10-CM

## 2021-10-26 PROCEDURE — 99214 OFFICE O/P EST MOD 30 MIN: CPT | Performed by: FAMILY MEDICINE

## 2021-10-26 ASSESSMENT — MIFFLIN-ST. JEOR: SCORE: 1123.39

## 2021-10-26 NOTE — PROGRESS NOTES
ASSESMENT AND PLAN:  Essential hypertension  Controlled.     Memory problem  Referred to [facility]. Order faxed today.  - MENTAL HEALTH REFERRAL  - Adult; Assessments and Testing; Neuropsychological Testing; Other: [facility] [phone]; We will contact you to schedule the appointment or please call with any questions    Blindness of left eye, unspecified right eye visual impairment category  Unchanged.    Depression, unspecified depression type  Referral order placed for neuropsych testing and citizenship waiver.  - MENTAL HEALTH REFERRAL  - Adult; Assessments and Testing; Neuropsychological Testing; Other: [facility] [phone]; We will contact you to schedule the appointment or please call with any questions    Cerebrovascular accident (CVA) due to occlusion of other cerebral artery (H)  No residual weaknesses.    This transcription uses voice recognition software, which may contain typographical errors.      SUBJECTIVE: Jj Rios is a 71-year-old male with history of hypertension, remote history of CVA without residual symptoms and depression here to follow-up on hypertension. He is currently taking lisinopril 20 mg daily for hypertension. No medication side effects reported. He also takes Zocor 20 mg daily, Paxil 40 mg daily, Claritin 10 mg daily and omeprazole. He uses butyryl inhaler as needed. No acute wheezing or shortness of breath. No albuterol use in the past 2 weeks.  He has history of significant memory problems and depression. He was referred to [facility] psychology and wellness center for citizenship waiver. Waiver form was provided to him last year but was told by  that his waiver was . They went back to the same place for updated form but was told he no longer qualify for waiver.    Past Medical History:   Diagnosis Date     Anorexia      Cerebral vascular accident (H) 2014     CVA (cerebrovascular accident) (H)      Depression      GERD  "(gastroesophageal reflux disease)      High cholesterol      HLD (hyperlipidemia)      Hypertension      Insomnia      Patient Active Problem List   Diagnosis     Hypertension     Hyperlipidemia     Cataract     Depression     Rotator cuff syndrome of right shoulder     Insomnia     History of stroke     Blind left eye     Memory problem     Urinary incontinence, unspecified type       Allergies:  No Known Allergies    History   Smoking Status     Former Smoker     Years: 45.00     Quit date: 4/1/2015   Smokeless Tobacco     Former User     Comment: no passive exposure       Review of systems otherwise negative except as listed in HPI.   History   Smoking Status     Former Smoker     Years: 45.00     Quit date: 4/1/2015   Smokeless Tobacco     Former User     Comment: no passive exposure       OBJECTICE: /76 (BP Location: Right arm, Cuff Size: Adult Small)   Pulse 55   Temp 98.1  F (36.7  C) (Oral)   Ht 1.525 m (5' 0.04\")   Wt 52.5 kg (115 lb 12.8 oz)   SpO2 95%   BMI 22.59 kg/m          GEN-alert,  in no apparent distress.  HEENT- neck is supple.  CV-regular rate and rhythm with no murmur.   RESP-lungs clear to auscultation .  ABDOMEN- Soft , not tender.  EXTREM- No joint swelling or tenderness.   SKIN-normal        Donald Pavon MD   10/26/2021   "

## 2021-10-28 ENCOUNTER — TELEPHONE (OUTPATIENT)
Dept: FAMILY MEDICINE | Facility: CLINIC | Age: 71
End: 2021-10-28

## 2021-10-28 NOTE — TELEPHONE ENCOUNTER
Received a letter from [facility] for psychology and wellness.   Patient was seen there on 2021. Citizenship waiver form was completed one year ago and now .  Updated and 648 waiver will be completed by [facility] for psychology and wellness center. They do not need to go to [facility]. Please call the patient.

## 2021-10-28 NOTE — TELEPHONE ENCOUNTER
Called pt and [facility] to daughter [facility]  [facility] and therefore pt does not need to go to [facility].   Daughter said she received a [facility] and [facility] up waiver.  Thanks

## 2021-12-02 ENCOUNTER — OFFICE VISIT (OUTPATIENT)
Dept: FAMILY MEDICINE | Facility: CLINIC | Age: 71
End: 2021-12-02
Payer: COMMERCIAL

## 2021-12-02 VITALS
RESPIRATION RATE: 16 BRPM | OXYGEN SATURATION: 99 % | WEIGHT: 114.31 LBS | HEART RATE: 52 BPM | HEIGHT: 60 IN | DIASTOLIC BLOOD PRESSURE: 74 MMHG | TEMPERATURE: 98.1 F | SYSTOLIC BLOOD PRESSURE: 126 MMHG | BODY MASS INDEX: 22.44 KG/M2

## 2021-12-02 DIAGNOSIS — H54.40 BLINDNESS OF LEFT EYE, UNSPECIFIED RIGHT EYE VISUAL IMPAIRMENT CATEGORY: ICD-10-CM

## 2021-12-02 DIAGNOSIS — I10 PRIMARY HYPERTENSION: Primary | ICD-10-CM

## 2021-12-02 DIAGNOSIS — R94.4 ABNORMAL RENAL FUNCTION TEST: ICD-10-CM

## 2021-12-02 DIAGNOSIS — R62.7 ADULT FAILURE TO THRIVE: ICD-10-CM

## 2021-12-02 DIAGNOSIS — E78.5 HYPERLIPIDEMIA, UNSPECIFIED HYPERLIPIDEMIA TYPE: ICD-10-CM

## 2021-12-02 LAB
CREAT SERPL-MCNC: 1.4 MG/DL (ref 0.7–1.3)
GFR SERPL CREATININE-BSD FRML MDRD: 50 ML/MIN/1.73M2

## 2021-12-02 PROCEDURE — 82565 ASSAY OF CREATININE: CPT | Performed by: FAMILY MEDICINE

## 2021-12-02 PROCEDURE — 99214 OFFICE O/P EST MOD 30 MIN: CPT | Performed by: FAMILY MEDICINE

## 2021-12-02 PROCEDURE — 36415 COLL VENOUS BLD VENIPUNCTURE: CPT | Performed by: FAMILY MEDICINE

## 2021-12-02 RX ORDER — CYPROHEPTADINE HYDROCHLORIDE 4 MG/1
4 TABLET ORAL 2 TIMES DAILY
Qty: 60 TABLET | Refills: 4 | Status: SHIPPED | OUTPATIENT
Start: 2021-12-02 | End: 2024-02-22

## 2021-12-02 ASSESSMENT — MIFFLIN-ST. JEOR: SCORE: 1116.66

## 2021-12-02 NOTE — PROGRESS NOTES
ASSESMENT AND PLAN:  Primary hypertension  Controlled.     Abnormal renal function test  Recheck today  - Creatinine    Hyperlipidemia, unspecified hyperlipidemia type  Recheck at next visit.    Adult failure to thrive  Wt Readings from Last 3 Encounters:   12/02/21 51.9 kg (114 lb 5 oz)   10/26/21 52.5 kg (115 lb 12.8 oz)   09/21/21 54.2 kg (119 lb 9 oz)   Will try below med.  - cyproheptadine (PERIACTIN) 4 MG tablet; Take 1 tablet (4 mg) by mouth 2 times daily    Blindness of left eye, unspecified right eye visual impairment category        SUBJECTIVE: Jj Rios is here for follow up.  He is here with his daughter.  He has history of hypertension, hyperlipidemia, depression and history of stroke.  He is also blind in the left eye.  He brought in for pill bottles today Paxil, simvastatin and lisinopril.  Daughter states he has 1 bottle left at home.  He also uses nasal spray but not using it daily now.  Nosebleeding has improved.  Use albuterol to use as needed, has been using 2-3 times a week.  No acute cough or wheezing today.  His main concern today is poor appetite and low energy.  These symptoms are not new.  He also reports occasional dizziness but not daily.  He thinks he gets dizzy when his blood pressure is high but not checking blood pressure at home.  No acute chest pain or palpitations.  No acute fever or URI symptoms.  No known exposure to COVID-19.  Creatinine was slightly elevated in 9/21.  No known history of chronic kidney disease.    Past Medical History:   Diagnosis Date     Cerebral vascular accident (H) 12/2014     Depression      Hypertension      Insomnia      Patient Active Problem List   Diagnosis     Hypertension     Hyperlipidemia     Cataract     Depression     Rotator cuff syndrome of right shoulder     Insomnia     History of stroke     Blind left eye     Memory problem       Allergies:  No Known Allergies    History   Smoking Status     Former Smoker     Years: 45.00     Quit date:  4/1/2015   Smokeless Tobacco     Former User     Comment: no passive exposure       Review of systems otherwise negative except as listed in HPI.   History   Smoking Status     Former Smoker     Years: 45.00     Quit date: 4/1/2015   Smokeless Tobacco     Former User     Comment: no passive exposure       OBJECTICE: There were no vitals taken for this visit.    DATA REVIEWED:    Labs Reviewed or Ordered (1):      GEN-alert,  in no apparent distress.  HEENT-mucous membranes are moist, neck is supple.  CV-regular rate and rhythm with no murmur.   RESP-lungs clear to auscultation .  ABDOMEN- Soft , not tender.  EXTREM- No edema.  SKIN-normal        Donald Pavon MD   12/2/2021

## 2022-02-14 ENCOUNTER — PATIENT OUTREACH (OUTPATIENT)
Dept: GERIATRIC MEDICINE | Facility: CLINIC | Age: 72
End: 2022-02-14
Payer: COMMERCIAL

## 2022-02-14 NOTE — PROGRESS NOTES
[facility] Care Coordination Contact    Called member to schedule annual HRA home visit. Member's phone reports that member is not receiving incoming calls at this time. Care coordinator tried the other contact number on file for Jj's son and got the same message stating no incoming calls. Care coordinator will try again on a later date.   CJ Bazan  [facility]  Cell: [phone]    Care coordinator contacted PCA agency to confirm what contact numbers they had on file for member. PCA agency had the same numbers listed.   CJ Bazan  [facility]  Cell: [phone]

## 2022-02-16 NOTE — PROGRESS NOTES
[facility] Care Coordination Contact    Called member to schedule annual HRA home visit. Member's phone reports that member is not receiving incoming calls at this time. Care coordinator tried the other contact number on file for Jj's son and got the same message stating no incoming calls. Care coordinator will try again on a later date.     Care coordinator reached out to PCA agency again requesting that the PCA working with member give care coordinator a call when she is with the member to get updated contact information and to schedule annual assessment. Care coordinator left contact information with PCA agency.   CJ Bazan  [facility]  Cell: [phone]

## 2022-02-17 ENCOUNTER — PATIENT OUTREACH (OUTPATIENT)
Dept: GERIATRIC MEDICINE | Facility: CLINIC | Age: 72
End: 2022-02-17
Payer: COMMERCIAL

## 2022-02-17 NOTE — PROGRESS NOTES
[facility] Care Coordination Contact    [facility] Home Visit Assessment     Home visit for Health Risk Assessment with Jj Rios completed on February 17, 2022    Current Care Plan  Member currently receiving the following home care services:  None  Member currently receiving the following community resources:  PCA, Adult Day Care and DME supplies.     Medication Review  Medication reconciliation completed in Epic: Yes  Medication set-up & administration: Family/informal caregiver sets up daily.  Family caregiver administers medications.  Medication Risk Assessment Medication (1 or more, place referral to MTM): N/A: No risk factors identified  MTM Referral Placed: No: No risk factors idenified       Mental/Behavioral Health   Depression Screening:    PHQ-2 Total Score: 2        Mental health DX:: Yes(Depression)   Mental health DX how managed:: Medication     Falls Assessment:   Fallen 2 or more times in the past year?: No   Any fall with injury in the past year?: No     ADL/IADL Dependencies:   Dependent ADLs:: Ambulation-cane, Bathing, Dressing, Grooming, Incontinence, Transfers, Toileting  Dependent IADLs:: Cleaning, Cooking, Laundry, Shopping, Meal Preparation, Transportation, Money Management, Incontinence     [facility] sponsored benefits: Shared information re: Silver Sneakers/gym memberships, ASA, Calcium +D.     PCA Assessment completed at visit: Yes Annual PCA assessment indicated 22 units per day of PCA. Same as last year.       Elderly Waiver Eligibility: Yes - will continue on EW     Care Plan & Recommendations: Member would like to continue to receive elderly waivers services that include daily PCA services and DME supplies. Adult Day care is still on hold due to COVID-19. No further services or equipment needs requested at this time.      See CC for detailed assessment information.     Follow-Up Plan: Member informed of future contact, plan to f/u with member with a 6 month  telephone assessment.  Contact information shared with member and family, encouraged member to call with any questions or concerns at any time.

## 2022-02-23 ENCOUNTER — OFFICE VISIT (OUTPATIENT)
Dept: FAMILY MEDICINE | Facility: CLINIC | Age: 72
End: 2022-02-23
Payer: COMMERCIAL

## 2022-02-23 VITALS
TEMPERATURE: 98.3 F | HEIGHT: 60 IN | SYSTOLIC BLOOD PRESSURE: 92 MMHG | RESPIRATION RATE: 16 BRPM | OXYGEN SATURATION: 97 % | WEIGHT: 117 LBS | BODY MASS INDEX: 22.97 KG/M2 | HEART RATE: 57 BPM | DIASTOLIC BLOOD PRESSURE: 58 MMHG

## 2022-02-23 DIAGNOSIS — H10.45 CHRONIC ALLERGIC CONJUNCTIVITIS: Primary | ICD-10-CM

## 2022-02-23 DIAGNOSIS — Z28.39 IMMUNIZATION DEFICIENCY: ICD-10-CM

## 2022-02-23 DIAGNOSIS — H54.40 BLINDNESS OF LEFT EYE, UNSPECIFIED RIGHT EYE VISUAL IMPAIRMENT CATEGORY: ICD-10-CM

## 2022-02-23 PROCEDURE — 99213 OFFICE O/P EST LOW 20 MIN: CPT | Performed by: FAMILY MEDICINE

## 2022-02-23 NOTE — PROGRESS NOTES
ASSESMENT AND PLAN:  Diagnoses and all orders for this visit:  Chronic allergic conjunctivitis  -     ketotifen (ZADITOR) 0.025 % ophthalmic solution; Place 1 drop into both eyes 2 times daily as needed for itching  Blindness of left eye, unspecified right eye visual impairment category  Counseled the patient with the help of a professional  on the importance of regular follow-up with his ophthalmologist to ensure good care of his remaining functional eye.  Immunization deficiency  Immunization review and counseling done with the patient, he declines COVID-19 vaccination.    Reviewed the risks and benefits of the treatment plan with the patient and/or caregiver and we discussed indications for routine and emergent follow-up.        SUBJECTIVE: 72-year-old male is blind in the left eye.  Patient reports some itching of both eyes on and off over the last month.  He has had similar symptoms over the past years and has had good response to eyedrops in the past.  No visual changes in the right eye.    Past Medical History:   Diagnosis Date     Cerebral vascular accident (H) 12/2014     Depression      Hypertension      Insomnia      Patient Active Problem List   Diagnosis     Hypertension     Hyperlipidemia     Cataract     Depression     Rotator cuff syndrome of right shoulder     Insomnia     History of stroke     Blind left eye     Memory problem     Chronic allergic conjunctivitis     Current Outpatient Medications   Medication Sig Dispense Refill     albuterol (PROAIR HFA;PROVENTIL HFA;VENTOLIN HFA) 90 mcg/actuation inhaler [ALBUTEROL (PROAIR HFA;PROVENTIL HFA;VENTOLIN HFA) 90 MCG/ACTUATION INHALER] INHALE 1 TO 2 PUFFS BY MOUTH EVERY 6 HOURS AS NEEDED FOR WHEEZING 18 g 5     aspirin (ASA) 81 MG EC tablet [ASPIRIN 81 MG EC TABLET] TAKE 1 TABLET BY MOUTH DAILY 90 tablet 3     blood-gluc,BP meter,adult cuff Miguelina [BLOOD-GLUC,BP METER,ADULT CUFF MIGUELINA] Check BP daily 1 Device 0     clobetasoL (TEMOVATE) 0.05 %  ointment [CLOBETASOL (TEMOVATE) 0.05 % OINTMENT] Apply thin layer to affected area twice a day 30 g 0     cyproheptadine (PERIACTIN) 4 MG tablet Take 1 tablet (4 mg) by mouth 2 times daily 60 tablet 4     diaper,brief,adult,disposable Misc [DIAPER,BRIEF,ADULT,DISPOSABLE MISC] Use 2-3 a day as needed 80 each 5     fluticasone propionate (FLONASE) 50 mcg/actuation nasal spray [FLUTICASONE PROPIONATE (FLONASE) 50 MCG/ACTUATION NASAL SPRAY] SHAKE LIQUID AND USE 2 SPRAYS IN EACH NOSTRIL DAILY 48 g 3     ketotifen (ZADITOR) 0.025 % ophthalmic solution Place 1 drop into both eyes 2 times daily as needed for itching 10 mL 11     lanolin alcohol-mo-w.pet-ceres (EUCERIN) Crea [LANOLIN ALCOHOL-MO-W.PET-CERES (EUCERIN) CREA] Apply thin layer to affected area twice a day  0     lisinopril (ZESTRIL) 20 MG tablet TAKE 1 TABLET(20 MG) BY MOUTH DAILY 30 tablet 11     omeprazole (PRILOSEC) 20 MG DR capsule [OMEPRAZOLE (PRILOSEC) 20 MG CAPSULE] TAKE 1 CAPSULE(20 MG) BY MOUTH DAILY 90 capsule 3     PARoxetine (PAXIL) 40 MG tablet [PAROXETINE (PAXIL) 40 MG TABLET] TAKE 1 TABLET(40 MG) BY MOUTH EVERY MORNING 30 tablet 11     simvastatin (ZOCOR) 20 MG tablet [SIMVASTATIN (ZOCOR) 20 MG TABLET] TAKE 1 TABLET(20 MG) BY MOUTH AT BEDTIME 90 tablet 3     History   Smoking Status     Former Smoker     Years: 45.00     Quit date: 4/1/2015   Smokeless Tobacco     Former User     Comment: no passive exposure       OBJECTICE: BP 92/58 (BP Location: Right arm)   Pulse 57   Temp 98.3  F (36.8  C) (Oral)   Resp 16   Ht 1.524 m (5')   Wt 53.1 kg (117 lb)   SpO2 97%   BMI 22.85 kg/m       No results found for this or any previous visit (from the past 24 hour(s)).     HEENT-left eye is opacified, right eye shows some mild conjunctivitis, no corneal opacity   CV-regular rate and rhythm    RESP-lungs clear to auscultation       Terence Purvis MD

## 2022-03-01 ENCOUNTER — PATIENT OUTREACH (OUTPATIENT)
Dept: GERIATRIC MEDICINE | Facility: CLINIC | Age: 72
End: 2022-03-01
Payer: COMMERCIAL

## 2022-03-01 NOTE — LETTER
March 1, 2022    MARIA DEL CARMEN HYDE  [address]   [place] [address]        Dear Maria Del Carmen:    At [facility], we are dedicated to improving your health and well-being. Enclosed is the Comprehensive Care Plan that we developed with you on 2/17/2022. Please review the Care Plan carefully.    As a reminder, some of the things we discussed at your visit include:    Your physical and mental health    Ways to reduce falls    Health care needs you may have    Don t forget to contact your care coordinator if you:    Have been hospitalized or plan to be hospitalized     Have had a fall     Have experienced a change in physical health    Are experiencing emotional problems     If you do not agree with your Care Plan, have questions about it, or have experienced a change in your needs, please call me at [phone]. If you are hearing impaired, please call the [place] [employer] at 681 or [phone] (fvnusj-nc-ngauko relay service).    Sincerely,        CJ Bazan    E-mail: [email or web address]  Phone: [phone]      [id number] [place] ([id number])     [id number] (11/18)

## 2022-03-01 NOTE — PROGRESS NOTES
[facility] Care Coordination Contact    Received after visit chart from care coordinator.  Completed following tasks: Mailed copy of care plan to client, Updated services in access and Submitted referrals/auths for ADC  , Provider Signature - No POC Shared:  Member indicates that they do not want their POC shared with any EW providers.  [facility]:  Emailed completed PCA assessment to [facility].  Faxed copy of PCA assessment to PCA Agency and mailed copy to member.  Faxed MD Communication to PCP.   Mailed POC and PCA sig pages to member to sign and return in SASE.    Kati Shields  Care Management Specialist   [facility]   [phone]

## 2022-03-07 NOTE — PROGRESS NOTES
"ASSESMENT AND PLAN:  Diagnoses and all orders for this visit:    Essential hypertension  I will recheck his blood pressure twice today, 98/76 and 104/78.  Continue to hold lisinopril.  They will continue to monitor blood pressure at home.  Normal CMP last month.    Mixed hyperlipidemia  No medication side effects reported.    Depression, unspecified depression type  On Paxil.  Stable.    Chest tightness or pressure  Unable to complete stress test.  Symptoms improving.  Will consider pharmacology/nuclear stress testing if needed in the future.    This transcription uses voice recognition software, which may contain typographical errors.      SUBJECTIVE: Jj Rios is a 69-year-old male with history of depression, hyperlipidemia, history of stroke, blind left eye here to follow-up on hypertension.  He was on lisinopril 20 mg daily for hypertension.  Lisinopril was discontinued since 2/19 due to normal blood pressure.  He has home blood pressure cuff, daughter has been monitoring his blood pressure, the numbers are 130s-140s/80s-90s at home.  Daughter states that she usually take it first thing in the morning.  Patient thinks his blood pressure is high when he has headaches and neck pain.      He was referred to get a stress test.  They were unable to complete the test.  Did not achieve 85% of his maximum predicted heart rate.  States he was\" tired\" during the test.  He denied chest pain.    Past Medical History:   Diagnosis Date     Anorexia      Cerebral vascular accident (H) 12/2014     CVA (cerebrovascular accident) (H)      Depression      GERD (gastroesophageal reflux disease)      High cholesterol      HLD (hyperlipidemia)      Hypertension      Insomnia      Patient Active Problem List   Diagnosis     Hypertension     Hyperlipidemia     Cataract     Depression     Rotator cuff syndrome of right shoulder     Insomnia     History of stroke     Blind left eye       Allergies:  No Known Allergies    Social History " "    Tobacco Use   Smoking Status Former Smoker     Years: 45.00     Last attempt to quit: 2015     Years since quittin.2   Smokeless Tobacco Former User       Review of systems otherwise negative except as listed in HPI.   Social History     Tobacco Use   Smoking Status Former Smoker     Years: 45.00     Last attempt to quit: 2015     Years since quittin.2   Smokeless Tobacco Former User       OBJECTICE: /70 (Patient Site: Right Arm, Patient Position: Sitting, Cuff Size: Adult Regular)   Pulse 80   Temp 98.3  F (36.8  C) (Oral)   Resp 16   Ht 5' 0.12\" (1.527 m)   Wt 121 lb 9 oz (55.1 kg)   SpO2 94%   BMI 23.65 kg/m      DATA REVIEWED:  Additional History from Old Records Summarized (2):   Labs Reviewed or Ordered (1):       GEN-alert,  in no apparent distress.  CV-regular rate and rhythm with no murmur.   RESP-lungs clear to auscultation .  ABDOMEN- Soft , not tender.  NEURO- Normal.  SKIN-normal        Donald Pavon   2019   " Adult

## 2022-03-24 ENCOUNTER — MEDICAL CORRESPONDENCE (OUTPATIENT)
Dept: HEALTH INFORMATION MANAGEMENT | Facility: CLINIC | Age: 72
End: 2022-03-24
Payer: COMMERCIAL

## 2022-06-05 DIAGNOSIS — K21.9 GASTROESOPHAGEAL REFLUX DISEASE WITHOUT ESOPHAGITIS: ICD-10-CM

## 2022-06-06 NOTE — TELEPHONE ENCOUNTER
"Last Written Prescription Date:  9/17/2021  Last Fill Quantity: 90,  # refills: 3  Last office visit provider: 2/23/2022 with DR CAMPOS Purvis      Requested Prescriptions   Pending Prescriptions Disp Refills     omeprazole (PRILOSEC) 20 MG DR capsule [Pharmacy Med Name: OMEPRAZOLE 20MG CAPSULES] 90 capsule 3     Sig: TAKE 1 CAPSULE(20 MG) BY MOUTH DAILY       PPI Protocol Passed - 6/5/2022 10:10 AM        Passed - Not on Clopidogrel (unless Pantoprazole ordered)        Passed - No diagnosis of osteoporosis on record        Passed - Recent (12 mo) or future (30 days) visit within the authorizing provider's specialty     Patient has had an office visit with the authorizing provider or a provider within the authorizing providers department within the previous 12 mos or has a future within next 30 days. See \"Patient Info\" tab in inbasket, or \"Choose Columns\" in Meds & Orders section of the refill encounter.              Passed - Medication is active on med list        Passed - Patient is age 18 or older             Matilda Robertson RN 06/05/22 11:03 PM  "

## 2022-08-23 ENCOUNTER — PATIENT OUTREACH (OUTPATIENT)
Dept: GERIATRIC MEDICINE | Facility: CLINIC | Age: 72
End: 2022-08-23

## 2022-08-24 ENCOUNTER — PATIENT OUTREACH (OUTPATIENT)
Dept: GERIATRIC MEDICINE | Facility: CLINIC | Age: 72
End: 2022-08-24

## 2022-08-24 NOTE — PROGRESS NOTES
[facility] Care Coordination Contact      [facility] Six-Month Telephone Assessment    6 month telephone assessment completed on 8/24/22.    ER visits: No  Hospitalizations: No  TCU stays: No  Significant health status changes: No changes to report. Member reports health is stable at this time.   Falls/Injuries: No  ADL/IADL changes: No  Changes in services: No- No further services or equipment needs requested at this time.     Caregiver Assessment follow up:  NA    Goals: See POC in chart for goal progress documentation.  Goals continued.     Will see member in 6 months for an annual health risk assessment.   Encouraged member to call CC with any questions or concerns in the meantime.   /CJ Bazan  [facility]  Cell: [phone]

## 2022-08-26 ENCOUNTER — PATIENT OUTREACH (OUTPATIENT)
Dept: GERIATRIC MEDICINE | Facility: CLINIC | Age: 72
End: 2022-08-26

## 2022-10-12 ENCOUNTER — TELEPHONE (OUTPATIENT)
Dept: FAMILY MEDICINE | Facility: CLINIC | Age: 72
End: 2022-10-12

## 2022-10-12 NOTE — TELEPHONE ENCOUNTER
Daughter called to schedule an annual preventative care with Dr. Pavon. Writer scheduled pt on 11/29/22.    Magdy Aragon BSN RN  [facility]

## 2022-10-19 DIAGNOSIS — F32.1 CURRENT MODERATE EPISODE OF MAJOR DEPRESSIVE DISORDER, UNSPECIFIED WHETHER RECURRENT (H): ICD-10-CM

## 2022-10-19 DIAGNOSIS — E78.1 PURE HYPERGLYCERIDEMIA: ICD-10-CM

## 2022-10-19 DIAGNOSIS — Z76.0 ENCOUNTER FOR MEDICATION REFILL: Primary | ICD-10-CM

## 2022-10-20 RX ORDER — SIMVASTATIN 20 MG
TABLET ORAL
Qty: 90 TABLET | Refills: 0 | Status: SHIPPED | OUTPATIENT
Start: 2022-10-20 | End: 2022-11-28

## 2022-10-20 NOTE — TELEPHONE ENCOUNTER
One time refill sent. Due for office visit. Please call the patient.    Dr. Donald Pavon  10/20/2022 11:19 AM

## 2022-10-21 RX ORDER — PAROXETINE 40 MG/1
TABLET, FILM COATED ORAL
Qty: 90 TABLET | Refills: 0 | Status: SHIPPED | OUTPATIENT
Start: 2022-10-21 | End: 2022-12-21

## 2022-11-04 ENCOUNTER — TELEPHONE (OUTPATIENT)
Dept: FAMILY MEDICINE | Facility: CLINIC | Age: 72
End: 2022-11-04

## 2022-11-04 NOTE — TELEPHONE ENCOUNTER
Name of form/paperwork: Request for Medical Opinion and Verification Request forms  Have you been seen for this request: yes  Do we have the form: yes in blue folder  When is form needed by: 11/08/2022  How would you like the form returned: will    and Phone#:Ravinder Aragon [phone]  Fax Number: N/A  Patient Notified form requests are processed in 3-5 business days: yes  (If patient needs form sooner, please note that in this message.)  Okay to leave a detailed message? Yes

## 2022-11-17 NOTE — TELEPHONE ENCOUNTER
Form was completed but will need a signature from patient and a copy for medical records. Called patient left message form will be at the .

## 2022-11-25 DIAGNOSIS — E78.1 PURE HYPERGLYCERIDEMIA: ICD-10-CM

## 2022-11-25 DIAGNOSIS — Z76.0 ENCOUNTER FOR MEDICATION REFILL: ICD-10-CM

## 2022-11-25 NOTE — TELEPHONE ENCOUNTER
"Routing refill request to provider for review/approval because:  Labs not current:  LDL  Early refill request    Last Written Prescription Date:  10/20/2022  Last Fill Quantity: 90,  # refills: 0   Last office visit provider:  2/23/2022     Requested Prescriptions   Pending Prescriptions Disp Refills     simvastatin (ZOCOR) 20 MG tablet [Pharmacy Med Name: SIMVASTATIN 20MG TABLETS] 90 tablet 0     Sig: TAKE 1 TABLET BY MOUTH EVERY NIGHT AT BEDTIME.       Statins Protocol Failed - 11/25/2022  4:25 PM        Failed - LDL on file in past 12 months     Recent Labs   Lab Test 11/03/20  1336                Passed - No abnormal creatine kinase in past 12 months     No lab results found.             Passed - Recent (12 mo) or future (30 days) visit within the authorizing provider's specialty     Patient has had an office visit with the authorizing provider or a provider within the authorizing providers department within the previous 12 mos or has a future within next 30 days. See \"Patient Info\" tab in inbasket, or \"Choose Columns\" in Meds & Orders section of the refill encounter.              Passed - Medication is active on med list        Passed - Patient is age 18 or older             Yolie Pantoja RN 11/25/22 4:25 PM  "

## 2022-11-28 RX ORDER — SIMVASTATIN 20 MG
TABLET ORAL
Qty: 90 TABLET | Refills: 0 | Status: SHIPPED | OUTPATIENT
Start: 2022-11-28 | End: 2022-12-21

## 2022-12-20 ENCOUNTER — MEDICAL CORRESPONDENCE (OUTPATIENT)
Dept: HEALTH INFORMATION MANAGEMENT | Facility: CLINIC | Age: 72
End: 2022-12-20

## 2022-12-20 DIAGNOSIS — R32 URINARY INCONTINENCE, UNSPECIFIED TYPE: Primary | ICD-10-CM

## 2022-12-21 ENCOUNTER — OFFICE VISIT (OUTPATIENT)
Dept: FAMILY MEDICINE | Facility: CLINIC | Age: 72
End: 2022-12-21
Payer: COMMERCIAL

## 2022-12-21 VITALS
SYSTOLIC BLOOD PRESSURE: 88 MMHG | OXYGEN SATURATION: 98 % | HEIGHT: 60 IN | TEMPERATURE: 98.2 F | HEART RATE: 62 BPM | WEIGHT: 116 LBS | RESPIRATION RATE: 12 BRPM | DIASTOLIC BLOOD PRESSURE: 50 MMHG | BODY MASS INDEX: 22.78 KG/M2

## 2022-12-21 DIAGNOSIS — E78.1 PURE HYPERGLYCERIDEMIA: ICD-10-CM

## 2022-12-21 DIAGNOSIS — Z00.00 ROUTINE GENERAL MEDICAL EXAMINATION AT HEALTH CARE FACILITY: Primary | ICD-10-CM

## 2022-12-21 DIAGNOSIS — H54.40 BLINDNESS OF LEFT EYE, UNSPECIFIED RIGHT EYE VISUAL IMPAIRMENT CATEGORY: ICD-10-CM

## 2022-12-21 DIAGNOSIS — I10 ESSENTIAL HYPERTENSION: ICD-10-CM

## 2022-12-21 DIAGNOSIS — Z76.0 ENCOUNTER FOR MEDICATION REFILL: ICD-10-CM

## 2022-12-21 DIAGNOSIS — L30.9 DERMATITIS: ICD-10-CM

## 2022-12-21 DIAGNOSIS — J98.01 BRONCHOSPASM: ICD-10-CM

## 2022-12-21 DIAGNOSIS — F32.1 CURRENT MODERATE EPISODE OF MAJOR DEPRESSIVE DISORDER, UNSPECIFIED WHETHER RECURRENT (H): ICD-10-CM

## 2022-12-21 LAB
ANION GAP SERPL CALCULATED.3IONS-SCNC: 20 MMOL/L (ref 7–15)
BUN SERPL-MCNC: 29.5 MG/DL (ref 8–23)
CALCIUM SERPL-MCNC: 9.6 MG/DL (ref 8.8–10.2)
CHLORIDE SERPL-SCNC: 103 MMOL/L (ref 98–107)
CREAT SERPL-MCNC: 1.56 MG/DL (ref 0.67–1.17)
DEPRECATED HCO3 PLAS-SCNC: 18 MMOL/L (ref 22–29)
GFR SERPL CREATININE-BSD FRML MDRD: 47 ML/MIN/1.73M2
GLUCOSE SERPL-MCNC: 102 MG/DL (ref 70–99)
POTASSIUM SERPL-SCNC: 4.3 MMOL/L (ref 3.4–5.3)
SODIUM SERPL-SCNC: 141 MMOL/L (ref 136–145)

## 2022-12-21 PROCEDURE — 36415 COLL VENOUS BLD VENIPUNCTURE: CPT | Performed by: FAMILY MEDICINE

## 2022-12-21 PROCEDURE — 90471 IMMUNIZATION ADMIN: CPT | Performed by: FAMILY MEDICINE

## 2022-12-21 PROCEDURE — 99213 OFFICE O/P EST LOW 20 MIN: CPT | Mod: 25 | Performed by: FAMILY MEDICINE

## 2022-12-21 PROCEDURE — 99397 PER PM REEVAL EST PAT 65+ YR: CPT | Mod: 25 | Performed by: FAMILY MEDICINE

## 2022-12-21 PROCEDURE — 90662 IIV NO PRSV INCREASED AG IM: CPT | Performed by: FAMILY MEDICINE

## 2022-12-21 PROCEDURE — 80048 BASIC METABOLIC PNL TOTAL CA: CPT | Performed by: FAMILY MEDICINE

## 2022-12-21 RX ORDER — LISINOPRIL 10 MG/1
10 TABLET ORAL DAILY
Qty: 90 TABLET | Refills: 3 | Status: SHIPPED | OUTPATIENT
Start: 2022-12-21 | End: 2024-02-22

## 2022-12-21 RX ORDER — CLOTRIMAZOLE 1 %
CREAM (GRAM) TOPICAL 2 TIMES DAILY
Qty: 60 G | Refills: 2 | Status: SHIPPED | OUTPATIENT
Start: 2022-12-21 | End: 2024-02-22

## 2022-12-21 RX ORDER — ALBUTEROL SULFATE 90 UG/1
AEROSOL, METERED RESPIRATORY (INHALATION)
Qty: 18 G | Refills: 5 | Status: SHIPPED | OUTPATIENT
Start: 2022-12-21 | End: 2024-02-12

## 2022-12-21 RX ORDER — SIMVASTATIN 20 MG
TABLET ORAL
Qty: 90 TABLET | Refills: 3 | Status: SHIPPED | OUTPATIENT
Start: 2022-12-21 | End: 2024-02-06

## 2022-12-21 RX ORDER — PAROXETINE 40 MG/1
TABLET, FILM COATED ORAL
Qty: 90 TABLET | Refills: 3 | Status: SHIPPED | OUTPATIENT
Start: 2022-12-21 | End: 2024-02-06

## 2022-12-21 ASSESSMENT — ACTIVITIES OF DAILY LIVING (ADL)
CURRENT_FUNCTION: MONEY MANAGEMENT REQUIRES ASSISTANCE
CURRENT_FUNCTION: HOUSEWORK REQUIRES ASSISTANCE
CURRENT_FUNCTION: TRANSPORTATION REQUIRES ASSISTANCE
CURRENT_FUNCTION: SHOPPING REQUIRES ASSISTANCE
CURRENT_FUNCTION: MEDICATION ADMINISTRATION REQUIRES ASSISTANCE
CURRENT_FUNCTION: LAUNDRY REQUIRES ASSISTANCE
CURRENT_FUNCTION: TELEPHONE REQUIRES ASSISTANCE
CURRENT_FUNCTION: PREPARING MEALS REQUIRES ASSISTANCE

## 2022-12-21 ASSESSMENT — ENCOUNTER SYMPTOMS
NERVOUS/ANXIOUS: 0
JOINT SWELLING: 0
CHILLS: 1
FEVER: 0
HEADACHES: 0
NAUSEA: 0
SHORTNESS OF BREATH: 0
DYSURIA: 0
EYE PAIN: 0
HEMATOCHEZIA: 0
SORE THROAT: 0
PALPITATIONS: 0
CONSTIPATION: 0
ABDOMINAL PAIN: 0
MYALGIAS: 0
DIZZINESS: 0
HEMATURIA: 0
COUGH: 1
FREQUENCY: 0
ARTHRALGIAS: 0
PARESTHESIAS: 0
WEAKNESS: 0
DIARRHEA: 0
HEARTBURN: 0

## 2022-12-21 ASSESSMENT — PATIENT HEALTH QUESTIONNAIRE - PHQ9
SUM OF ALL RESPONSES TO PHQ QUESTIONS 1-9: 1
SUM OF ALL RESPONSES TO PHQ QUESTIONS 1-9: 1
10. IF YOU CHECKED OFF ANY PROBLEMS, HOW DIFFICULT HAVE THESE PROBLEMS MADE IT FOR YOU TO DO YOUR WORK, TAKE CARE OF THINGS AT HOME, OR GET ALONG WITH OTHER PEOPLE: NOT DIFFICULT AT ALL

## 2022-12-21 NOTE — PROGRESS NOTES
ASSESMENT AND PLAN:    Physical, Health Maitenence -   Reviewed healthy lifestyle, diet, exercise, vitamins, and follow-up plan today with patient.  Reviewed age appropriate cancer and other screening recommendations.  Immunization review and update done.  Reviewed indicated lab tests, see lab orders.   -     INFLUENZA VACCINE 65+ (FLUZONE HD)  Dermatitis  -     clotrimazole (LOTRIMIN) 1 % external cream; Apply topically 2 times daily  Bronchospasm  -     albuterol (PROAIR HFA/PROVENTIL HFA/VENTOLIN HFA) 108 (90 Base) MCG/ACT inhaler; [ALBUTEROL (PROAIR HFA;PROVENTIL HFA;VENTOLIN HFA) 90 MCG/ACTUATION INHALER] INHALE 1 TO 2 PUFFS BY MOUTH EVERY 6 HOURS AS NEEDED FOR WHEEZING  Current moderate episode of major depressive disorder, well contriolled  -     PARoxetine (PAXIL) 40 MG tablet; TAKE 1 TABLET(40 MG) BY MOUTH EVERY MORNING  Encounter for medication refill  -     simvastatin (ZOCOR) 20 MG tablet; TAKE 1 TABLET BY MOUTH EVERY NIGHT AT BEDTIME.  -     PARoxetine (PAXIL) 40 MG tablet; TAKE 1 TABLET(40 MG) BY MOUTH EVERY MORNING  Pure hyperglyceridemia  -     simvastatin (ZOCOR) 20 MG tablet; TAKE 1 TABLET BY MOUTH EVERY NIGHT AT BEDTIME.  Essential hypertension  Given that today's blood pressure is in the hypotensive range and that he is getting some lightheadedness as detailed below we decided to decrease his dose of lisinopril to 10 mg daily.  -     lisinopril (ZESTRIL) 10 MG tablet; Take 1 tablet (10 mg) by mouth daily  -     Basic metabolic panel  (Ca, Cl, CO2, Creat, Gluc, K, Na, BUN); Future  Blindness of left eye, unspecified right eye visual impairment category  Patient counseled with the help of a professional  on the importance of regular eye clinic follow-up for preservation of vision in his right eye.  Patient meeting with our specialty scheduling staff today to be assisted with scheduling a appointment for ophthalmology.  -     Adult Eye  Referral; Future        HPI: 72-year-old  male here for his preventative and health maintenance visit.  He has multiple medical problems as detailed below.  His blood pressure today is noted to be low.  He does report that over the last few weeks has been getting intermittent mild to moderate lightheadedness.  No syncope or near syncope.  Patient also reports an itchy rash that he has had intermittently on his right hand over the past year.  He reports that the cream that he was prescribed previously, which on chart review is a steroid, was not helpful.  He would like to try something different.      Past Medical History:   Diagnosis Date     Cerebral vascular accident (H) 12/2014     Depression      Hypertension      Insomnia        Current Outpatient Medications   Medication Sig Dispense Refill     albuterol (PROAIR HFA/PROVENTIL HFA/VENTOLIN HFA) 108 (90 Base) MCG/ACT inhaler [ALBUTEROL (PROAIR HFA;PROVENTIL HFA;VENTOLIN HFA) 90 MCG/ACTUATION INHALER] INHALE 1 TO 2 PUFFS BY MOUTH EVERY 6 HOURS AS NEEDED FOR WHEEZING 18 g 5     aspirin (ASA) 81 MG EC tablet [ASPIRIN 81 MG EC TABLET] TAKE 1 TABLET BY MOUTH DAILY 90 tablet 3     clotrimazole (LOTRIMIN) 1 % external cream Apply topically 2 times daily 60 g 2     ketotifen (ZADITOR) 0.025 % ophthalmic solution Place 1 drop into both eyes 2 times daily as needed for itching 10 mL 11     lanolin alcohol-mo-w.pet-ceres (EUCERIN) Crea [LANOLIN ALCOHOL-MO-W.PET-CERES (EUCERIN) CREA] Apply thin layer to affected area twice a day  0     lisinopril (ZESTRIL) 10 MG tablet Take 1 tablet (10 mg) by mouth daily 90 tablet 3     omeprazole (PRILOSEC) 20 MG DR capsule TAKE 1 CAPSULE(20 MG) BY MOUTH DAILY 90 capsule 2     PARoxetine (PAXIL) 40 MG tablet TAKE 1 TABLET(40 MG) BY MOUTH EVERY MORNING 90 tablet 3     simvastatin (ZOCOR) 20 MG tablet TAKE 1 TABLET BY MOUTH EVERY NIGHT AT BEDTIME. 90 tablet 3     blood-gluc,BP meter,adult cuff Miguelina [BLOOD-GLUC,BP METER,ADULT CUFF MIGUELINA] Check BP daily 1 Device 0     cyproheptadine  (PERIACTIN) 4 MG tablet Take 1 tablet (4 mg) by mouth 2 times daily 60 tablet 4     diaper,brief,adult,disposable Misc [DIAPER,BRIEF,ADULT,DISPOSABLE MISC] Use 2-3 a day as needed 80 each 5     fluticasone propionate (FLONASE) 50 mcg/actuation nasal spray [FLUTICASONE PROPIONATE (FLONASE) 50 MCG/ACTUATION NASAL SPRAY] SHAKE LIQUID AND USE 2 SPRAYS IN EACH NOSTRIL DAILY 48 g 3       Patient Active Problem List   Diagnosis     Hypertension     Hyperlipidemia     Cataract     Depression     Rotator cuff syndrome of right shoulder     Insomnia     History of stroke     Blind left eye     Memory problem     Chronic allergic conjunctivitis     Bronchospasm     Dermatitis     Current moderate episode of major depressive disorder, unspecified whether recurrent (H)       Social History     Socioeconomic History     Marital status:      Spouse name: None     Number of children: 5     Years of education: None     Highest education level: None   Tobacco Use     Smoking status: Former     Years: 45.00     Types: Cigarettes     Quit date: 2015     Years since quittin.7     Smokeless tobacco: Former     Tobacco comments:     no passive exposure   Substance and Sexual Activity     Alcohol use: No     Drug use: No     Sexual activity: Not Currently     Partners: Female   Social History Narrative    2020:    - [place] refugee. [place] ; he is not yet a [place] citizen.  - ; wife passed away in . They had 5 children together.  - Approved for 4 hours/day of PCA services; daughter Jack Palmer is his PCA.       History   Smoking Status     Former     Years: 45.00     Quit date: 2015   Smokeless Tobacco     Former     Comment: no passive exposure       OBJECTICE: BP (!) 88/50 (BP Location: Right arm)   Pulse 62   Temp 98.2  F (36.8  C) (Oral)   Resp 12   Ht 1.524 m (5')   Wt 52.6 kg (116 lb)   SpO2 98%   BMI 22.65 kg/m        Gen - alert, orientated, NAD  Eyes - left eye opacification  ENT -  "oropharynx clear, TMs clear  Neck - supple, no palpable mass or lymphadenopathy  CV - RRR, no murmur  Resp - lungs CTA  Ab - soft, nontender, no palpable mass or organomegaly   - normal appearance to the external genetalia, normal testicular exam bilaterally, no hernia  Extrem - warm, no edema  Neuro - CN II-XII intact, strength, sensation, reflexes intact and symmetric  Skin - hand rash, no atypical appearing lesions seen.         Healthy Habits:     In general, how would you rate your overall health?  Fair    Frequency of exercise:  6-7 days/week    Duration of exercise:  15-30 minutes    Do you usually eat at least 4 servings of fruit and vegetables a day, include whole grains    & fiber and avoid regularly eating high fat or \"junk\" foods?  Yes    Taking medications regularly:  Yes    Medication side effects:  None    Ability to successfully perform activities of daily living:  Telephone requires assistance, transportation requires assistance, shopping requires assistance, preparing meals requires assistance, housework requires assistance, laundry requires assistance, medication administration requires assistance and money management requires assistance    Home Safety:  Throw rugs in the hallway    Hearing Impairment:  No hearing concerns    In the past 6 months, have you been bothered by leaking of urine? Yes    In general, how would you rate your overall mental or emotional health?  Good      PHQ-2 Total Score: 0    Additional concerns today:  No    Today's PHQ-2 Score:   PHQ-2 (  [employer]) 2022   Q1: Little interest or pleasure in doing things 0   Q2: Feeling down, depressed or hopeless 0   PHQ-2 Score 0   Q1: Little interest or pleasure in doing things Not at all   Q2: Feeling down, depressed or hopeless Not at all   PHQ-2 Score 0           Social History     Tobacco Use     Smoking status: Former     Years: 45.00     Types: Cigarettes     Quit date: 2015     Years since quittin.7     " Smokeless tobacco: Former     Tobacco comments:     no passive exposure   Substance Use Topics     Alcohol use: No     If you drink alcohol do you typically have >3 drinks per day or >7 drinks per week? No    Alcohol Use 12/21/2022   Prescreen: >3 drinks/day or >7 drinks/week? No   No flowsheet data found.        Review of Systems   Constitutional: Positive for chills. Negative for fever.   HENT: Negative for congestion, ear pain, hearing loss and sore throat.    Eyes: Positive for visual disturbance. Negative for pain.   Respiratory: Positive for cough. Negative for shortness of breath.    Cardiovascular: Negative for chest pain, palpitations and peripheral edema.   Gastrointestinal: Negative for abdominal pain, constipation, diarrhea, heartburn, hematochezia and nausea.   Genitourinary: Negative for dysuria, frequency, genital sores, hematuria, impotence, penile discharge and urgency.   Musculoskeletal: Negative for arthralgias, joint swelling and myalgias.   Skin: Negative for rash.   Neurological: Negative for dizziness, weakness, headaches and paresthesias.   Psychiatric/Behavioral: Negative for mood changes. The patient is not nervous/anxious.      Answers for HPI/ROS submitted by the patient on 12/21/2022  If you checked off any problems, how difficult have these problems made it for you to do your work, take care of things at home, or get along with other people?: Not difficult at all  PHQ9 TOTAL SCORE: 1

## 2022-12-26 ENCOUNTER — PATIENT OUTREACH (OUTPATIENT)
Dept: GERIATRIC MEDICINE | Facility: CLINIC | Age: 72
End: 2022-12-26

## 2022-12-26 NOTE — PROGRESS NOTES
[facility] Care Coordination Contact    Internal CC change effective 1/1/2023.  Mailed member CC Change letter.  Additional tasks to be completed by CMS include: update database & EPIC, enter CC Change in MMIS, and move member files on Q drive.    Antonietta Tripathi  Case Management Specialist  [facility]  [phone]

## 2022-12-26 NOTE — LETTER
December 26, 2022    MARIA DEL CARMEN HYDE  [address]   [place] [address]      Dear Maria Del Carmen:    As a member of [facility] ([facility] you are provided a care coordinator. I will be your new care coordinator as of 1/1/2023. I will be calling you soon to see how you are doing and determine your needs.    If you have any questions, please feel free to call me at [phone]. If you reach my voice mail, please leave a message and your phone number. If you are hearing impaired, please call the [place] Relay at 853 or [phone] (ozoqgd-ub-flcuht relay service).    I look forward to speaking with you soon.    Sincerely,          Marcie Rivera RN  [phone]  [email or web address]        [facility]  [id number] DHS Approved ([id number])  [id number] (11/18)

## 2022-12-26 NOTE — PROGRESS NOTES
Encounter opened due to Regulatory [employer] Update to open FVP Program.    Yolie Mishra  Case Management Specialist   [facility]  [phone]

## 2022-12-26 NOTE — PROGRESS NOTES
Encounter opened due to Regulatory [employer] Update to close FVP Program.    Yolie Mishra  Case Management Specialist   [facility]  [phone]

## 2023-01-02 ENCOUNTER — PATIENT OUTREACH (OUTPATIENT)
Dept: GERIATRIC MEDICINE | Facility: CLINIC | Age: 73
End: 2023-01-02

## 2023-01-05 ENCOUNTER — PATIENT OUTREACH (OUTPATIENT)
Dept: GERIATRIC MEDICINE | Facility: CLINIC | Age: 73
End: 2023-01-05

## 2023-01-05 SDOH — ECONOMIC STABILITY: TRANSPORTATION INSECURITY
IN THE PAST 12 MONTHS, HAS THE LACK OF TRANSPORTATION KEPT YOU FROM MEDICAL APPOINTMENTS OR FROM GETTING MEDICATIONS?: NO

## 2023-01-05 SDOH — ECONOMIC STABILITY: INCOME INSECURITY: IN THE LAST 12 MONTHS, WAS THERE A TIME WHEN YOU WERE NOT ABLE TO PAY THE MORTGAGE OR RENT ON TIME?: NO

## 2023-01-05 SDOH — ECONOMIC STABILITY: TRANSPORTATION INSECURITY
IN THE PAST 12 MONTHS, HAS LACK OF TRANSPORTATION KEPT YOU FROM MEETINGS, WORK, OR FROM GETTING THINGS NEEDED FOR DAILY LIVING?: NO

## 2023-01-05 SDOH — HEALTH STABILITY: PHYSICAL HEALTH: ON AVERAGE, HOW MANY DAYS PER WEEK DO YOU ENGAGE IN MODERATE TO STRENUOUS EXERCISE (LIKE A BRISK WALK)?: 0 DAYS

## 2023-01-05 SDOH — HEALTH STABILITY: PHYSICAL HEALTH: ON AVERAGE, HOW MANY MINUTES DO YOU ENGAGE IN EXERCISE AT THIS LEVEL?: 0 MIN

## 2023-01-05 ASSESSMENT — SOCIAL DETERMINANTS OF HEALTH (SDOH)
HOW OFTEN DO YOU ATTENT MEETINGS OF THE CLUB OR ORGANIZATION YOU BELONG TO?: MORE THAN 4 TIMES PER YEAR
IN A TYPICAL WEEK, HOW MANY TIMES DO YOU TALK ON THE PHONE WITH FAMILY, FRIENDS, OR NEIGHBORS?: THREE TIMES A WEEK
DO YOU BELONG TO ANY CLUBS OR ORGANIZATIONS SUCH AS CHURCH GROUPS UNIONS, FRATERNAL OR ATHLETIC GROUPS, OR SCHOOL GROUPS?: YES
HOW OFTEN DO YOU ATTEND CHURCH OR RELIGIOUS SERVICES?: MORE THAN 4 TIMES PER YEAR
HOW OFTEN DO YOU GET TOGETHER WITH FRIENDS OR RELATIVES?: THREE TIMES A WEEK

## 2023-01-05 ASSESSMENT — LIFESTYLE VARIABLES
HOW OFTEN DO YOU HAVE A DRINK CONTAINING ALCOHOL: NEVER
AUDIT-C TOTAL SCORE: 0
HOW OFTEN DO YOU HAVE SIX OR MORE DRINKS ON ONE OCCASION: NEVER
SKIP TO QUESTIONS 9-10: 1
HOW MANY STANDARD DRINKS CONTAINING ALCOHOL DO YOU HAVE ON A TYPICAL DAY: PATIENT DOES NOT DRINK

## 2023-01-05 ASSESSMENT — PATIENT HEALTH QUESTIONNAIRE - PHQ9: SUM OF ALL RESPONSES TO PHQ QUESTIONS 1-9: 1

## 2023-01-05 NOTE — PROGRESS NOTES
[facility] Care Coordination Contact    [facility] Home Visit Assessment     Home visit for Health Risk Assessment with Jj Collier Blanca completed on January 6, 2023    Type of residence:: Apartment  Current living arrangement:: I live in a private home with family     Assessment completed with:: Caregiver, Children    Current Care Plan  Member currently receiving the following home care services:     Member currently receiving the following community resources: PCA      Medication Review  Medication reconciliation completed in Epic: Yes  Medication set-up & administration: Family/informal caregiver sets up every two weeks.  Family caregiver administers medications.  Medication Risk Assessment Medication (1 or more, place referral to MTM): N/A: No risk factors identified  MTM Referral Placed: No: No risk factors idenified    Mental/Behavioral Health   Depression Screening:   PHQ-2 Total Score (Adult) - Positive if 3 or more points; Administer PHQ-9 if positive: 0  PHQ-9 Total Score: 1    Mental health DX:: Yes   Mental health DX how managed:: Medication    Falls Assessment:   Fallen 2 or more times in the past year?: No   Any fall with injury in the past year?: No    ADL/IADL Dependencies:   Dependent ADLs:: Ambulation-cane, Bathing, Dressing, Grooming, Incontinence, Transfers, Toileting  Dependent IADLs:: Cleaning, Cooking, Laundry, Shopping, Meal Preparation, Transportation, Money Management, Incontinence    [facility] Plan sponsored benefits: Shared information re: Silver Sneakers/gym memberships, ASA, Calcium +D.    PCA Assessment completed at visit: Yes Annual PCA assessment indicated 5.5 hours per day or 22 units per day of PCA. This is the same as the previous assessment.     Elderly Waiver Eligibility: Yes-will open to EW    Care Plan & Recommendations: No falls or ER visits noted or in medical report since last annual assessment.  Jj Collier would like to continue receiving formal PCA servcies  provided by his daughter. He will continue to receive pull ups. Family will provide informal support as needed. Jj Collier reports poor appetite and difficulty swallowing foods and liquids. PCP recommended soft food.  PCP notified that Jj Collier reports choking 2/3x a week and if a swallow study is needed. Jj Collier hasn t attended day care all year, but does want to go once the weather is nicer. CC will keep EW open for that purpose, but did state that if waiver services aren't utilized over the next year they will be closed.     See [facility] for detailed assessment information.    Follow-Up Plan: Member informed of future contact, plan to f/u with member with a 6 month telephone assessment.  Contact information shared with member and family, encouraged member to call with any questions or concerns at any time.    [facility] care continuum providers: Please see Snapshot and Care Management Flowsheets for Specific details of care plan.    This CC note routed to PCP.    Marcie Rivera RN  [facility]  [phone]

## 2023-01-06 NOTE — TELEPHONE ENCOUNTER
Donald Pavon MD  [facility] Family [facility]/Ob Support Pool 8 minutes ago (2:41 PM)       Please call the patient to make appointment to address these concerns.     Dr. Donald Pavon   1/6/2023 2:41 PM      Writer attempt to call pt back with the help of a Yolie  regarding Dr. Pavon's message above. No answer, left non-detailed VM with call back number.    If pt calls back, please relay Dr. Pavon's message to pt. Thanks.    RAQUEL RangelN, RN   [facility]

## 2023-01-09 NOTE — TELEPHONE ENCOUNTER
Called with assistance of an  to assist daughter and patient to set up a future an appointment with pcp to address health concerns per provider.  Date, time and location confirmed with patient and family.    RAQUEL PerezN, RN  [facility]

## 2023-01-11 ENCOUNTER — PATIENT OUTREACH (OUTPATIENT)
Dept: GERIATRIC MEDICINE | Facility: CLINIC | Age: 73
End: 2023-01-11

## 2023-01-11 NOTE — LETTER
January 11, 2023    MARIA DEL CARMEN HYDE  [address]   [place] [address]        Dear Maria Del Carmen:    At [facility], we re dedicated to improving your health and wellness. Enclosed is the Care Plan developed with you on 01/05/2023. Please review the Care Plan carefully.    As a reminder, during your visit we talked about:    Ways to manage your physical and mental health    Using health care to maintain and improve your health     Your preventive care needs     Remember to contact your care coordinator if you:    Are hospitalized, or plan to be hospitalized     Have a fall      Have a change in your physical or mental health    Need help finding support or services    If you have questions, or don t agree with your Care Plan, call me at [phone]. You can also call me if your needs change. TTY users, call the [place] Relay at (467) or [phone] (vqhvba-ge-txpzlb relay service).    Sincerely,          Marcie Rivera RN  [phone]  [email or web address]        [facility]    [id number] accepted    [id number] (07/2022)

## 2023-01-11 NOTE — PROGRESS NOTES
[facility] Care Coordination Contact    Received after visit chart from care coordinator.  Completed following tasks: Mailed copy of care plan to client, Updated services in Database, Mailed copy of the signed  POC and PCA to member and Mailed [facility] Safe Medication Disposal   , Provider Signature - No POC Shared:  Member indicates that they do not want their POC shared with any EW providers.      [facility]:  Emailed completed PCA assessment to [facility].  Faxed copy of PCA assessment to PCA Agency and mailed copy to member.  Faxed MD Communication to PCP.     Sobia Ray  Care Management Specialist  [facility]  [phone]

## 2023-01-13 NOTE — PROGRESS NOTES
[facility] Care Coordination Contact    Called member and  Kirill Aguero to schedule annual HRA home visit. HRA has been scheduled for 01/04/2023.     [phone]

## 2023-01-19 ENCOUNTER — OFFICE VISIT (OUTPATIENT)
Dept: FAMILY MEDICINE | Facility: CLINIC | Age: 73
End: 2023-01-19
Payer: COMMERCIAL

## 2023-01-19 VITALS
OXYGEN SATURATION: 98 % | SYSTOLIC BLOOD PRESSURE: 104 MMHG | HEART RATE: 55 BPM | BODY MASS INDEX: 23.2 KG/M2 | TEMPERATURE: 97.9 F | WEIGHT: 118.19 LBS | RESPIRATION RATE: 16 BRPM | HEIGHT: 60 IN | DIASTOLIC BLOOD PRESSURE: 68 MMHG

## 2023-01-19 DIAGNOSIS — F32.1 CURRENT MODERATE EPISODE OF MAJOR DEPRESSIVE DISORDER, UNSPECIFIED WHETHER RECURRENT (H): ICD-10-CM

## 2023-01-19 DIAGNOSIS — E78.2 MIXED HYPERLIPIDEMIA: ICD-10-CM

## 2023-01-19 DIAGNOSIS — I10 ESSENTIAL HYPERTENSION: Primary | ICD-10-CM

## 2023-01-19 DIAGNOSIS — H54.40 BLINDNESS OF LEFT EYE, UNSPECIFIED RIGHT EYE VISUAL IMPAIRMENT CATEGORY: ICD-10-CM

## 2023-01-19 DIAGNOSIS — L30.9 DERMATITIS: ICD-10-CM

## 2023-01-19 LAB
CHOLEST SERPL-MCNC: 199 MG/DL
CREAT SERPL-MCNC: 1.48 MG/DL (ref 0.67–1.17)
GFR SERPL CREATININE-BSD FRML MDRD: 49 ML/MIN/1.73M2
HDLC SERPL-MCNC: 44 MG/DL
LDLC SERPL CALC-MCNC: 120 MG/DL
NONHDLC SERPL-MCNC: 155 MG/DL
TRIGL SERPL-MCNC: 175 MG/DL

## 2023-01-19 PROCEDURE — 36415 COLL VENOUS BLD VENIPUNCTURE: CPT | Performed by: FAMILY MEDICINE

## 2023-01-19 PROCEDURE — 80061 LIPID PANEL: CPT | Performed by: FAMILY MEDICINE

## 2023-01-19 PROCEDURE — 99214 OFFICE O/P EST MOD 30 MIN: CPT | Performed by: FAMILY MEDICINE

## 2023-01-19 PROCEDURE — 82565 ASSAY OF CREATININE: CPT | Performed by: FAMILY MEDICINE

## 2023-01-19 RX ORDER — TRIAMCINOLONE ACETONIDE 1 MG/G
CREAM TOPICAL 2 TIMES DAILY
Qty: 80 G | Refills: 1 | Status: SHIPPED | OUTPATIENT
Start: 2023-01-19 | End: 2024-02-22

## 2023-01-19 NOTE — PROGRESS NOTES
Essential hypertension  BP is still at the low normal range.  We will continue lisinopril at 10 mg (not 20 mg).  - Creatinine; Future  - Creatinine    Mixed hyperlipidemia  - Lipid panel    Blindness of left eye, unspecified right eye visual impairment category  States he saw ophthalmology recently.  No note available to review.  States he was advised to follow-up with them.  Unclear if he has follow-up appointment scheduled.  His grandson will check on this.    Dermatitis  - triamcinolone (KENALOG) 0.1 % external cream; Apply topically 2 times daily    Current moderate episode of major depressive disorder, unspecified whether recurrent (H)  Stable.  Denies suicidal or homicidal ideations.      Subjective   Jj is a 73 year old accompanied by his Grandson, presenting for the following health issues:  Follow Up  HTN:  Lisinopril was decreased to 10 mg from 20 mg.   No new concerns today but cream for his hands is not helping. ( Clotrimazole ). Still having dryness and itchiness.     History of Present Illness       Reason for visit:  F/u      Review of Systems   Constitutional, cardiovascular, pulmonary, endocrine and psych systems are negative, except as otherwise noted.      Objective    /68 (BP Location: Left arm, Patient Position: Sitting, Cuff Size: Adult Regular)   Pulse 55   Temp 97.9  F (36.6  C) (Oral)   Resp 16   Ht 1.524 m (5')   Wt 53.6 kg (118 lb 3 oz)   SpO2 98%   BMI 23.08 kg/m    Body mass index is 23.08 kg/m .  Physical Exam   GENERAL: healthy, alert and no distress  EYES: Opaque left eye.  NECK: no adenopathy, no asymmetry, masses, or scars and thyroid normal to palpation  RESP: lungs clear to auscultation - no rales, rhonchi or wheezes  CV: regular rate and rhythm, normal S1 S2, no S3 or S4, no murmur, click or rub, no peripheral edema and peripheral pulses strong  ABDOMEN: soft, nontender, no hepatosplenomegaly, no masses and bowel sounds normal  SKIN-dryness and rash on hands, no  signs of infection.

## 2023-01-20 ENCOUNTER — TELEPHONE (OUTPATIENT)
Dept: FAMILY MEDICINE | Facility: CLINIC | Age: 73
End: 2023-01-20
Payer: COMMERCIAL

## 2023-01-20 DIAGNOSIS — R94.4 ABNORMAL RENAL FUNCTION TEST: Primary | ICD-10-CM

## 2023-01-20 NOTE — TELEPHONE ENCOUNTER
Called pt and spoke to daughter (CTC on file). Relayed lab result and lab appt scheduled on 2/17.    Magdy Aragon, BSN RN  [facility]        ----- Message from Donald Pavon MD sent at 1/20/2023  2:46 PM CST -----  Kidney function is improving but still abnormal.  Needs to increase daily water intake.  We should recheck in about 4 weeks.  Future lab order placed.  Please call the patient.  Thank you,    Dr. Donald Pavon  1/20/2023 2:46 PM

## 2023-02-17 ENCOUNTER — LAB (OUTPATIENT)
Dept: LAB | Facility: CLINIC | Age: 73
End: 2023-02-17
Payer: COMMERCIAL

## 2023-02-17 DIAGNOSIS — R94.4 ABNORMAL RENAL FUNCTION TEST: ICD-10-CM

## 2023-02-17 LAB
CREAT SERPL-MCNC: 1.43 MG/DL (ref 0.67–1.17)
GFR SERPL CREATININE-BSD FRML MDRD: 51 ML/MIN/1.73M2

## 2023-02-17 PROCEDURE — 36415 COLL VENOUS BLD VENIPUNCTURE: CPT

## 2023-02-17 PROCEDURE — 82565 ASSAY OF CREATININE: CPT

## 2023-05-01 ENCOUNTER — TELEPHONE (OUTPATIENT)
Dept: FAMILY MEDICINE | Facility: CLINIC | Age: 73
End: 2023-05-01
Payer: COMMERCIAL

## 2023-05-01 NOTE — TELEPHONE ENCOUNTER
Forms/Letter Request    Type of form/letter: history & physical form    Have you been seen for this request: Yes 01/19/2023    Do we have the form/letter: Yes:     Who is the form from? Patient    Where did/will the form come from? Patient or family brought in       When is form/letter needed by: 05/05/23    How would you like the form/letter returned: Fax : [phone] and     Patient Notified form requests are processed in 3-5 business days:Yes    Okay to leave a detailed message?: Yes at Home number on file [phone] (home)

## 2023-05-01 NOTE — TELEPHONE ENCOUNTER
This form reviewed. No notes about upcoming procedure or the WHO/WHEN/WHAT of this request. Patient will need a pre-op exam with PCP. VF will hold form in RN office until scheduled.     Please contact patient and schedule pre-op exam - cannot complete form without that office visit. Thanks.

## 2023-05-03 NOTE — TELEPHONE ENCOUNTER
Daughter  calls back inquiring about status of form.  TC related message below however daughter states pt eye surgery is scheduled for 5/15/23 .  PCP has NO OPENINGS for a pre op before the 15th.      Daughter would like to know if PCP can work him in.  TC did offer other providers but daughter declined. Pt will be calling back tomorrow to check on message.      Writer inform caller message was sent to provider- awaiting for response. Writer will send provider another message. Caller verbalizes understanding.     Greta Lott CMA ,   [facility]  May 3, 2023 9:48 AM

## 2023-05-04 ENCOUNTER — TELEPHONE (OUTPATIENT)
Dept: FAMILY MEDICINE | Facility: CLINIC | Age: 73
End: 2023-05-04
Payer: COMMERCIAL

## 2023-05-04 NOTE — TELEPHONE ENCOUNTER
granddaughter calls back inquiring about status of apt  Request.       PCP said either 5/9/ at 3 pm or 5/11 at 940 am.  Scheduled pt for 940 am on 5/11 for pre op.     Caller verbalizes understanding.     Greta Lott CMA ,   [facility]  May 4, 2023 4:39 PM

## 2023-05-11 ENCOUNTER — OFFICE VISIT (OUTPATIENT)
Dept: FAMILY MEDICINE | Facility: CLINIC | Age: 73
End: 2023-05-11
Payer: COMMERCIAL

## 2023-05-11 VITALS
BODY MASS INDEX: 22.82 KG/M2 | OXYGEN SATURATION: 98 % | HEIGHT: 60 IN | SYSTOLIC BLOOD PRESSURE: 120 MMHG | DIASTOLIC BLOOD PRESSURE: 78 MMHG | TEMPERATURE: 98 F | RESPIRATION RATE: 16 BRPM | HEART RATE: 64 BPM | WEIGHT: 116.25 LBS

## 2023-05-11 DIAGNOSIS — Z01.818 PRE-OP EXAM: Primary | ICD-10-CM

## 2023-05-11 DIAGNOSIS — H54.40 BLINDNESS OF LEFT EYE, UNSPECIFIED RIGHT EYE VISUAL IMPAIRMENT CATEGORY: ICD-10-CM

## 2023-05-11 DIAGNOSIS — I10 ESSENTIAL HYPERTENSION: ICD-10-CM

## 2023-05-11 DIAGNOSIS — H26.9 CATARACT OF RIGHT EYE, UNSPECIFIED CATARACT TYPE: ICD-10-CM

## 2023-05-11 DIAGNOSIS — Z86.73 H/O: STROKE: ICD-10-CM

## 2023-05-11 DIAGNOSIS — E78.2 MIXED HYPERLIPIDEMIA: ICD-10-CM

## 2023-05-11 DIAGNOSIS — F32.1 CURRENT MODERATE EPISODE OF MAJOR DEPRESSIVE DISORDER, UNSPECIFIED WHETHER RECURRENT (H): ICD-10-CM

## 2023-05-11 DIAGNOSIS — N18.9 CHRONIC KIDNEY DISEASE, UNSPECIFIED CKD STAGE: ICD-10-CM

## 2023-05-11 LAB
ANION GAP SERPL CALCULATED.3IONS-SCNC: 11 MMOL/L (ref 7–15)
BUN SERPL-MCNC: 20.5 MG/DL (ref 8–23)
CALCIUM SERPL-MCNC: 9.5 MG/DL (ref 8.8–10.2)
CHLORIDE SERPL-SCNC: 105 MMOL/L (ref 98–107)
CHOLEST SERPL-MCNC: 202 MG/DL
CREAT SERPL-MCNC: 1.43 MG/DL (ref 0.67–1.17)
DEPRECATED HCO3 PLAS-SCNC: 24 MMOL/L (ref 22–29)
GFR SERPL CREATININE-BSD FRML MDRD: 51 ML/MIN/1.73M2
GLUCOSE SERPL-MCNC: 102 MG/DL (ref 70–99)
HDLC SERPL-MCNC: 43 MG/DL
LDLC SERPL CALC-MCNC: 116 MG/DL
NONHDLC SERPL-MCNC: 159 MG/DL
POTASSIUM SERPL-SCNC: 4.7 MMOL/L (ref 3.4–5.3)
SODIUM SERPL-SCNC: 140 MMOL/L (ref 136–145)
TRIGL SERPL-MCNC: 217 MG/DL

## 2023-05-11 PROCEDURE — 80061 LIPID PANEL: CPT | Performed by: FAMILY MEDICINE

## 2023-05-11 PROCEDURE — 36415 COLL VENOUS BLD VENIPUNCTURE: CPT | Performed by: FAMILY MEDICINE

## 2023-05-11 PROCEDURE — 99214 OFFICE O/P EST MOD 30 MIN: CPT | Performed by: FAMILY MEDICINE

## 2023-05-11 PROCEDURE — 80048 BASIC METABOLIC PNL TOTAL CA: CPT | Performed by: FAMILY MEDICINE

## 2023-05-11 NOTE — PROGRESS NOTES
[address]  [place] [address]  Phone: [phone]  Fax: [phone]  Primary Provider: Arturo Pavon  Pre-op Performing Provider:    ARTURO PAVON  PHONE,     Pre-op exam  Proceed with procedure.  No COVID test needed per patient.  Ok to take meds with small sip of water on the day of procedure.  Daughter states she was not able to  eye drops at the pharmacy, advised to call Ophthalmology office.     Cataract of right eye, unspecified cataract type    Essential hypertension  Controlled.  - Basic metabolic panel    Current moderate episode of major depressive disorder, unspecified whether recurrent (H)  Stable.    Mixed hyperlipidemia  - Lipid panel    Blindness of left eye, unspecified right eye visual impairment category    Chronic kidney disease, unspecified CKD stage  Recheck today. Baseline creatinine 1.3, 1.4. Will consider Nephrology referral. Labs pending.    H/O: stroke  No acute concerns.       [id number]}  PREOPERATIVE EVALUATION:  Today's date: 5/11/2023    Jj Rios is a 73 year old male who presents for a preoperative evaluation.        5/11/2023     9:36 AM   Additional Questions   Roomed by Elena Santillan   Accompanied by daughter : Jack Palmer     Forms 5/11/2023   Any forms needing to be completed Yes     Surgical Information:  Surgery/Procedure: Cataract Right Eye   Surgery Location: [facility]-  Surgeon:  / Dr. Munguia?  Surgery Date: May 19, 2023  Time of Surgery: unknown   Where patient plans to recover: At home with family  Fax number for surgical facility: [phone]        Subjective     HPI related to upcoming procedure:     Cataract right eye. Blind left eye.     No h/o diabetes. Not on anticoagulant.           5/11/2023     9:30 AM   Preop Questions   1. Have you ever had a heart attack or stroke? YES - No residual symptoms   2. Have you ever had surgery on your heart or blood vessels, such as a stent  placement, a coronary artery bypass, or surgery on an artery in your head, neck, heart, or legs? No   3. Do you have chest pain with activity? No   4. Do you have a history of  heart failure? No   5. Do you currently have a cold, bronchitis or symptoms of other infection? No   6. Do you have a cough, shortness of breath, or wheezing? UNKNOWN    7. Do you or anyone in your family have previous history of blood clots? No   8. Do you or does anyone in your family have a serious bleeding problem such as prolonged bleeding following surgeries or cuts? No   9. Have you ever had problems with anemia or been told to take iron pills? No   10. Have you had any abnormal blood loss such as black, tarry or bloody stools? No   11. Have you ever had a blood transfusion? No   12. Are you willing to have a blood transfusion if it is medically needed before, during, or after your surgery? NO    13. Have you or any of your relatives ever had problems with anesthesia? No   14. Do you have sleep apnea, excessive snoring or daytime drowsiness? UNKNOWN    15. Do you have any artifical heart valves or other implanted medical devices like a pacemaker, defibrillator, or continuous glucose monitor? No   16. Do you have artificial joints? No   17. Are you allergic to latex? No       Health Care Directive:  Patient does not have a Health Care Directive.    Preoperative Review of :   reviewed - no record of controlled substances prescribed.      Status of Chronic Conditions: Stable.      Review of Systems  CONSTITUTIONAL: NEGATIVE for fever, chills, change in weight  RESP: NEGATIVE for significant cough or SOB  CV: NEGATIVE for chest pain, palpitations or peripheral edema    Patient Active Problem List    Diagnosis Date Noted     Bronchospasm 12/21/2022     Priority: Medium     Dermatitis 12/21/2022     Priority: Medium     Current moderate episode of major depressive disorder, unspecified whether recurrent (H) 12/21/2022     Priority:  Medium     Chronic allergic conjunctivitis 02/23/2022     Priority: Medium     Memory problem 12/20/2019     Priority: Medium     Blind left eye 06/20/2019     Priority: Medium     History of stroke 01/23/2017     Priority: Medium     Insomnia 07/13/2016     Priority: Medium     Rotator cuff syndrome of right shoulder 01/25/2016     Priority: Medium     Hypertension 07/16/2015     Priority: Medium     Hyperlipidemia 07/16/2015     Priority: Medium     Cataract 07/16/2015     Priority: Medium     Depression 07/16/2015     Priority: Medium      Past Medical History:   Diagnosis Date     Cerebral vascular accident (H) 12/2014     Depression      Hypertension      Insomnia      No past surgical history on file.  Current Outpatient Medications   Medication Sig Dispense Refill     albuterol (PROAIR HFA/PROVENTIL HFA/VENTOLIN HFA) 108 (90 Base) MCG/ACT inhaler [ALBUTEROL (PROAIR HFA;PROVENTIL HFA;VENTOLIN HFA) 90 MCG/ACTUATION INHALER] INHALE 1 TO 2 PUFFS BY MOUTH EVERY 6 HOURS AS NEEDED FOR WHEEZING 18 g 5     aspirin (ASA) 81 MG EC tablet [ASPIRIN 81 MG EC TABLET] TAKE 1 TABLET BY MOUTH DAILY 90 tablet 3     blood-gluc,BP meter,adult cuff Miguelina [BLOOD-GLUC,BP METER,ADULT CUFF MIGUELINA] Check BP daily 1 Device 0     clotrimazole (LOTRIMIN) 1 % external cream Apply topically 2 times daily 60 g 2     cyproheptadine (PERIACTIN) 4 MG tablet Take 1 tablet (4 mg) by mouth 2 times daily 60 tablet 4     diaper,brief,adult,disposable Misc [DIAPER,BRIEF,ADULT,DISPOSABLE MISC] Use 2-3 a day as needed 80 each 5     fluticasone propionate (FLONASE) 50 mcg/actuation nasal spray [FLUTICASONE PROPIONATE (FLONASE) 50 MCG/ACTUATION NASAL SPRAY] SHAKE LIQUID AND USE 2 SPRAYS IN EACH NOSTRIL DAILY 48 g 3     ketotifen (ZADITOR) 0.025 % ophthalmic solution Place 1 drop into both eyes 2 times daily as needed for itching 10 mL 11     lanolin alcohol-mo-w.pet-ceres (EUCERIN) Crea [LANOLIN ALCOHOL-MO-W.PET-CERES (EUCERIN) CREA] Apply thin layer to  "affected area twice a day  0     lisinopril (ZESTRIL) 10 MG tablet Take 1 tablet (10 mg) by mouth daily 90 tablet 3     omeprazole (PRILOSEC) 20 MG DR capsule TAKE 1 CAPSULE(20 MG) BY MOUTH DAILY 90 capsule 2     PARoxetine (PAXIL) 40 MG tablet TAKE 1 TABLET(40 MG) BY MOUTH EVERY MORNING 90 tablet 3     simvastatin (ZOCOR) 20 MG tablet TAKE 1 TABLET BY MOUTH EVERY NIGHT AT BEDTIME. 90 tablet 3     triamcinolone (KENALOG) 0.1 % external cream Apply topically 2 times daily 80 g 1       No Known Allergies     Social History     Tobacco Use     Smoking status: Former     Years: 45.00     Types: Cigarettes     Quit date: 2015     Years since quittin.1     Passive exposure: Past     Smokeless tobacco: Former     Tobacco comments:     no passive exposure   Vaping Use     Vaping status: Never Used     Passive vaping exposure: Yes   Substance Use Topics     Alcohol use: No       History   Drug Use No         Objective     /78 (BP Location: Left arm, Patient Position: Sitting, Cuff Size: Adult Regular)   Pulse 64   Temp 98  F (36.7  C) (Oral)   Resp 16   Ht 1.53 m (5' 0.24\")   Wt 52.7 kg (116 lb 4 oz)   SpO2 98%   BMI 22.53 kg/m      Physical Exam  GENERAL APPEARANCE: healthy, alert and no distress  RESP: lungs clear to auscultation - no rales, rhonchi or wheezes  CV: no murmur, click or rub  ABDOMEN: soft, nontender, no HSM or masses and bowel sounds normal    Recent Labs   Lab Test 23  1029 23  1053 22  1044 21  1041 21  1722   NA  --   --  141  --  138   POTASSIUM  --   --  4.3  --  4.0   CR 1.43* 1.48* 1.56*   < > 1.36*    < > = values in this interval not displayed.        Diagnostics:  Labs pending at this time.  Results will be reviewed when available.   No EKG required for low risk surgery (cataract, skin procedure, breast biopsy, etc).    Revised Cardiac Risk Index (RCRI):  The patient has the following serious cardiovascular risks for perioperative complications:   "          Signed Electronically by: Donald Pavon MD  Copy of this evaluation report is provided to requesting physician.    }

## 2023-06-19 DIAGNOSIS — K21.9 GASTROESOPHAGEAL REFLUX DISEASE WITHOUT ESOPHAGITIS: ICD-10-CM

## 2023-06-20 NOTE — TELEPHONE ENCOUNTER
"Routing refill request to provider for review/approval because:  A break in medication    Last Written Prescription Date:  6/5/2022  Last Fill Quantity: 90,  # refills: 2   Last office visit provider:  5/11/2023     Requested Prescriptions   Pending Prescriptions Disp Refills     omeprazole (PRILOSEC) 20 MG DR capsule [Pharmacy Med Name: OMEPRAZOLE 20MG CAPSULES] 90 capsule 2     Sig: TAKE 1 CAPSULE(20 MG) BY MOUTH DAILY       PPI Protocol Passed - 6/19/2023 12:16 PM        Passed - Not on Clopidogrel (unless Pantoprazole ordered)        Passed - No diagnosis of osteoporosis on record        Passed - Recent (12 mo) or future (30 days) visit within the authorizing provider's specialty     Patient has had an office visit with the authorizing provider or a provider within the authorizing providers department within the previous 12 mos or has a future within next 30 days. See \"Patient Info\" tab in inbasket, or \"Choose Columns\" in Meds & Orders section of the refill encounter.              Passed - Medication is active on med list        Passed - Patient is age 18 or older             Yolie Pantoja RN 06/20/23 8:08 AM  "

## 2023-07-13 ENCOUNTER — PATIENT OUTREACH (OUTPATIENT)
Dept: GERIATRIC MEDICINE | Facility: CLINIC | Age: 73
End: 2023-07-13
Payer: COMMERCIAL

## 2023-07-13 NOTE — PROGRESS NOTES
[facility] Care Coordination Contact      [facility] Six-Month Telephone Assessment    6 month telephone assessment completed on 7/13/23.    ER visits: No  Hospitalizations: No  TCU stays: No  Significant health status changes: No significant changes  Falls/Injuries: No  ADL/IADL changes: No  Changes in services: None    Caregiver Assessment follow up:  Reports no falls, hospitalizations, or ER visits.  Reports less chocking since changing to soft foods and soups diet. Completed cataract surgery 05/2023 on right eye.  personal care attendant services going well.        Goals: See POC in chart for goal progress documentation.      Will see member in 6 months for an annual health risk assessment.   Encouraged member to call CC with any questions or concerns in the meantime.     Marcie Rivera RN PHN  [facility]  [phone]

## 2023-10-16 ENCOUNTER — PATIENT OUTREACH (OUTPATIENT)
Dept: GERIATRIC MEDICINE | Facility: CLINIC | Age: 73
End: 2023-10-16
Payer: COMMERCIAL

## 2023-10-16 NOTE — PROGRESS NOTES
[facility] Care Coordination Contact    10.16.23:  Family called and indicated that Jj Collier would like to go to [facility] again.   Tasked CMS to Auth for 2x a week with transportation, effective 10/16/23    5 [facility]  Email: [email or web address]  (P) [phone]   (F) [phone]    NPI/Health Plan ID: [id number]  UMPI:  [id number]      Marcie Rivera RN PHN LSW  [facility]  [phone]

## 2023-10-17 ENCOUNTER — PATIENT OUTREACH (OUTPATIENT)
Dept: GERIATRIC MEDICINE | Facility: CLINIC | Age: 73
End: 2023-10-17
Payer: COMMERCIAL

## 2023-10-17 NOTE — PROGRESS NOTES
[facility] Care Coordination Contact    Provider Signature - No POC Shared:  Member indicates that they do not want their POC shared with any EW providers. and Member Signature - POC Change:  Per CC, member has made a change to their POC.  Care Plan Change Letter mailed to member for signature with a self-addressed return envelope.mail letter to provider  [facility] to sign and return     Sobia Ray  Care Management Specialist  [facility]  [phone]

## 2023-11-21 ENCOUNTER — PATIENT OUTREACH (OUTPATIENT)
Dept: CARE COORDINATION | Facility: CLINIC | Age: 73
End: 2023-11-21
Payer: COMMERCIAL

## 2023-11-22 DIAGNOSIS — Z76.0 ENCOUNTER FOR MEDICATION REFILL: ICD-10-CM

## 2023-11-22 RX ORDER — ASPIRIN 81 MG/1
TABLET ORAL
Qty: 90 TABLET | Refills: 1 | Status: SHIPPED | OUTPATIENT
Start: 2023-11-22

## 2023-11-22 NOTE — PROGRESS NOTES
[facility] Care Coordination Contact    2nd Attempt: Signed Letter not received from [facility] and member, resent per process.    Poli Natarajan  Care Management Specialist  [facility]  [phone]

## 2023-12-05 ENCOUNTER — PATIENT OUTREACH (OUTPATIENT)
Dept: CARE COORDINATION | Facility: CLINIC | Age: 73
End: 2023-12-05
Payer: COMMERCIAL

## 2023-12-05 ENCOUNTER — PATIENT OUTREACH (OUTPATIENT)
Dept: GERIATRIC MEDICINE | Facility: CLINIC | Age: 73
End: 2023-12-05
Payer: COMMERCIAL

## 2023-12-05 DIAGNOSIS — M75.101 ROTATOR CUFF SYNDROME OF RIGHT SHOULDER: ICD-10-CM

## 2023-12-05 DIAGNOSIS — H54.40 BLINDNESS OF LEFT EYE, UNSPECIFIED RIGHT EYE VISUAL IMPAIRMENT CATEGORY: Primary | ICD-10-CM

## 2023-12-05 ASSESSMENT — LIFESTYLE VARIABLES
SKIP TO QUESTIONS 9-10: 1
AUDIT-C TOTAL SCORE: 0

## 2023-12-10 NOTE — PROGRESS NOTES
[facility] Care Coordination Contact    [facility] Home Visit Assessment     Home visit for Health Risk Assessment with Jj Collier Kler completed on December 05, 2023    Type of residence:: Apartment  Current living arrangement:: I live in a private home with family     Assessment completed with:: Patient, Children, VM-chart review    Current Care Plan  Member currently receiving the following home care services:     Member currently receiving the following community resources: PCA, DME, Transportation Services, Day Care      Medication Review  Medication reconciliation completed in Epic: Yes  Medication set-up & administration: Family/informal caregiver sets up weekly.  Family caregiver administers medications.  Medication Risk Assessment Medication (1 or more, place referral to MTM): N/A: No risk factors identified  MTM Referral Placed: No: No risk factors idenified    Mental/Behavioral Health   Depression Screening:   PHQ-2 Total Score (Adult) - Positive if 3 or more points; Administer PHQ-9 if positive: 0       Mental health DX:: Yes   Mental health DX how managed:: Medication    Falls Assessment:   Fallen 2 or more times in the past year?: No   Any fall with injury in the past year?: No    ADL/IADL Dependencies:   Dependent ADLs:: Ambulation-cane, Bathing, Dressing, Grooming, Incontinence, Transfers, Toileting  Dependent IADLs:: Cleaning, Cooking, Laundry, Shopping, Meal Preparation, Transportation, Money Management, Incontinence, Medication Management    [facility] sponsored benefits: Shared information re: Silver Sneakers/gym memberships, ASA, Calcium +D.    PCA Assessment completed at visit: Yes Annual PCA assessment indicated 5.5 hours per day of PCA. This is the same as the previous assessment.     Elderly Waiver Eligibility: Yes-will continue on EW    Care Plan & Recommendations: Jj Collier is a 73-year-old male [place] refugee who arrived to the  in [place].  He became a citizen in 2022.  He is   as his wife passed away in 2016 and they have 5 children.  His 3 sons and 1 daughter live in [place] and the other daughter lives in [place].  Jj Collier is [place] speaking and lives with his youngest daughter, Jack Palmer, her  and 4 grandchildren [one on the way].  All of them live in a two-bedroom apartment on the 3rd level.  He quit smoking in 2015 and has not smoked for the past 8 years.  Does not drink alcohol or chew betel nut. Jj Collier is able to advocate and direct his needs.    Jj Collier is on an elderly waiver.  He will continue with 5.5 hours of PCA services to address his IADL and ADL needs. CC will auth increase ADC to 3x a week.  CC will send script for shower chair for MD to sign as his shower bench is broken and unusable.  Family will continue to provide formal and informal support.          See LTCC for detailed assessment information.    Follow-Up Plan: Member informed of future contact, plan to f/u with member with a 6 month telephone assessment.  Contact information shared with member and family, encouraged member to call with any questions or concerns at any time.    [place] care continuum providers: Please see Snapshot and Care Management Flowsheets for Specific details of care plan.    This CC note routed to PCP, Donald Pavon.    Marcie Rivera, RN PHN [facility]  [phone]

## 2023-12-11 ENCOUNTER — PATIENT OUTREACH (OUTPATIENT)
Dept: GERIATRIC MEDICINE | Facility: CLINIC | Age: 73
End: 2023-12-11
Payer: COMMERCIAL

## 2023-12-11 NOTE — PROGRESS NOTES
[facility] Care Coordination Contact    Received after visit chart from care coordinator.  Completed following tasks: Mailed copy of care plan to client, Mailed Safe Medication Disposal , Mailed [facility] Leave Behind Letter, Submitted referrals/auths for ADC with transportation and added shower chair to tracking spreadsheet tracking, Uploaded consent to communicate form(s) to Epic, and Updated services in Database    [facility]:  Emailed completed PCA assessment to [facility].  Faxed copy of PCA assessment to PCA Agency and mailed copy to member.  Faxed MD Communication to PCP.     Sobia Ray  Care Management Specialist  [facility]  [phone]

## 2023-12-11 NOTE — LETTER
December 11, 2023    MARIA DEL CARMEN HYDE  [address]   [place] [address]        Dear Maria Del Carmen:    At [facility], we re dedicated to improving your health and wellness. Enclosed is the Care Plan developed with you on 12/05/2023. Please review the Care Plan carefully.    As a reminder, during your visit we talked about:  Ways to manage your physical and mental health  Using health care to maintain and improve your health   Your preventive care needs     Remember to contact your care coordinator if you:  Are hospitalized, or plan to be hospitalized   Have a fall    Have a change in your physical or mental health  Need help finding support or services    If you have questions, or don t agree with your Care Plan, call me at [phone]. You can also call me if your needs change. TTY users, call the [place] [employer] at (828) or 1-954.825.7570 (peibcq-sw-majnlc relay service).    Sincerely,          Marcie Rivera RN  [phone]  [email or web address]        [facility]    [id number] accepted    [id number] (07/2022)

## 2023-12-26 NOTE — PROGRESS NOTES
No Letter Received: 60 day tracking of letter complete, no letter received from member & [facility]. Tracking discontinued.    Yolie Mishra  Case Management Specialist   [facility]  [phone]

## 2024-01-11 NOTE — PROGRESS NOTES
Per RAFA, CC notify of Delivered DME  Jj Rios [birth year]lier 1950 Shower chair-waiting 12/11/2023 Marcie Ceja  DELIVERED 1/5/2024         Sobia Hollis  Care Management Specialist  [facility]  [phone]

## 2024-02-05 DIAGNOSIS — I10 ESSENTIAL HYPERTENSION: ICD-10-CM

## 2024-02-05 DIAGNOSIS — Z76.0 ENCOUNTER FOR MEDICATION REFILL: ICD-10-CM

## 2024-02-05 DIAGNOSIS — F32.1 CURRENT MODERATE EPISODE OF MAJOR DEPRESSIVE DISORDER, UNSPECIFIED WHETHER RECURRENT (H): ICD-10-CM

## 2024-02-05 DIAGNOSIS — E78.1 PURE HYPERGLYCERIDEMIA: ICD-10-CM

## 2024-02-06 RX ORDER — LISINOPRIL 20 MG/1
TABLET ORAL
Qty: 30 TABLET | Refills: 11 | OUTPATIENT
Start: 2024-02-06

## 2024-02-06 RX ORDER — PAROXETINE 40 MG/1
TABLET, FILM COATED ORAL
Qty: 90 TABLET | Refills: 3 | Status: SHIPPED | OUTPATIENT
Start: 2024-02-06

## 2024-02-06 RX ORDER — SIMVASTATIN 20 MG
TABLET ORAL
Qty: 90 TABLET | Refills: 3 | Status: SHIPPED | OUTPATIENT
Start: 2024-02-06

## 2024-02-11 DIAGNOSIS — J98.01 BRONCHOSPASM: ICD-10-CM

## 2024-02-12 RX ORDER — ALBUTEROL SULFATE 90 UG/1
AEROSOL, METERED RESPIRATORY (INHALATION)
Qty: 18 G | Refills: 0 | Status: SHIPPED | OUTPATIENT
Start: 2024-02-12 | End: 2024-02-22

## 2024-02-14 ENCOUNTER — TELEPHONE (OUTPATIENT)
Dept: FAMILY MEDICINE | Facility: CLINIC | Age: 74
End: 2024-02-14
Payer: COMMERCIAL

## 2024-02-14 NOTE — TELEPHONE ENCOUNTER
Forms/Letter Request    Type of form/letter: OTHER: Incontinence Supply order: Pull up s/m mens 20pk       Do we have the form/letter: Yes: MZ     Who is the form from? [facility] (if other please explain)    Where did/will the form come from? form was faxed in    When is form/letter needed by: asap    How would you like the form/letter returned: Fax : [phone]

## 2024-02-15 ENCOUNTER — MEDICAL CORRESPONDENCE (OUTPATIENT)
Dept: HEALTH INFORMATION MANAGEMENT | Facility: CLINIC | Age: 74
End: 2024-02-15
Payer: COMMERCIAL

## 2024-02-21 DIAGNOSIS — I10 ESSENTIAL HYPERTENSION: ICD-10-CM

## 2024-02-21 RX ORDER — LISINOPRIL 10 MG/1
10 TABLET ORAL DAILY
Qty: 90 TABLET | Refills: 3 | Status: CANCELLED | OUTPATIENT
Start: 2024-02-21

## 2024-02-21 NOTE — TELEPHONE ENCOUNTER
Pt is completely out of medication for almost 2 weeks.  Please send to Debbie at [facility]    Scheduled pt in an overbook appt with Dr. Pavon tomorrow, 2/22.  Pt also has an AWV scheduled for 3/29 with Dr. Pavon.

## 2024-02-22 ENCOUNTER — ANCILLARY PROCEDURE (OUTPATIENT)
Dept: GENERAL RADIOLOGY | Facility: CLINIC | Age: 74
End: 2024-02-22
Attending: FAMILY MEDICINE
Payer: COMMERCIAL

## 2024-02-22 ENCOUNTER — OFFICE VISIT (OUTPATIENT)
Dept: FAMILY MEDICINE | Facility: CLINIC | Age: 74
End: 2024-02-22
Payer: COMMERCIAL

## 2024-02-22 VITALS
SYSTOLIC BLOOD PRESSURE: 136 MMHG | TEMPERATURE: 98.1 F | WEIGHT: 122.19 LBS | DIASTOLIC BLOOD PRESSURE: 84 MMHG | BODY MASS INDEX: 23.99 KG/M2 | OXYGEN SATURATION: 94 % | RESPIRATION RATE: 16 BRPM | HEIGHT: 60 IN | HEART RATE: 70 BPM

## 2024-02-22 DIAGNOSIS — R05.9 COUGH, UNSPECIFIED TYPE: ICD-10-CM

## 2024-02-22 DIAGNOSIS — N18.9 CHRONIC KIDNEY DISEASE, UNSPECIFIED CKD STAGE: ICD-10-CM

## 2024-02-22 DIAGNOSIS — I10 ESSENTIAL HYPERTENSION: ICD-10-CM

## 2024-02-22 DIAGNOSIS — F32.1 CURRENT MODERATE EPISODE OF MAJOR DEPRESSIVE DISORDER, UNSPECIFIED WHETHER RECURRENT (H): Primary | ICD-10-CM

## 2024-02-22 DIAGNOSIS — E78.5 HYPERLIPIDEMIA, UNSPECIFIED HYPERLIPIDEMIA TYPE: ICD-10-CM

## 2024-02-22 DIAGNOSIS — R53.83 OTHER FATIGUE: ICD-10-CM

## 2024-02-22 LAB
ALBUMIN SERPL BCG-MCNC: 4.4 G/DL (ref 3.5–5.2)
ALP SERPL-CCNC: 91 U/L (ref 40–150)
ALT SERPL W P-5'-P-CCNC: 15 U/L (ref 0–70)
ANION GAP SERPL CALCULATED.3IONS-SCNC: 12 MMOL/L (ref 7–15)
AST SERPL W P-5'-P-CCNC: 27 U/L (ref 0–45)
BILIRUB SERPL-MCNC: 0.3 MG/DL
BUN SERPL-MCNC: 17.4 MG/DL (ref 8–23)
CALCIUM SERPL-MCNC: 9.2 MG/DL (ref 8.8–10.2)
CHLORIDE SERPL-SCNC: 104 MMOL/L (ref 98–107)
CHOLEST SERPL-MCNC: 204 MG/DL
CREAT SERPL-MCNC: 1.34 MG/DL (ref 0.67–1.17)
DEPRECATED HCO3 PLAS-SCNC: 23 MMOL/L (ref 22–29)
EGFRCR SERPLBLD CKD-EPI 2021: 55 ML/MIN/1.73M2
ERYTHROCYTE [DISTWIDTH] IN BLOOD BY AUTOMATED COUNT: 15.5 % (ref 10–15)
FASTING STATUS PATIENT QL REPORTED: NO
GLUCOSE SERPL-MCNC: 116 MG/DL (ref 70–99)
HCT VFR BLD AUTO: 36.8 % (ref 40–53)
HDLC SERPL-MCNC: 47 MG/DL
HGB BLD-MCNC: 12.1 G/DL (ref 13.3–17.7)
LDLC SERPL CALC-MCNC: 128 MG/DL
MCH RBC QN AUTO: 27.8 PG (ref 26.5–33)
MCHC RBC AUTO-ENTMCNC: 32.9 G/DL (ref 31.5–36.5)
MCV RBC AUTO: 84 FL (ref 78–100)
NONHDLC SERPL-MCNC: 157 MG/DL
PLATELET # BLD AUTO: 271 10E3/UL (ref 150–450)
POTASSIUM SERPL-SCNC: 4 MMOL/L (ref 3.4–5.3)
PROT SERPL-MCNC: 8 G/DL (ref 6.4–8.3)
RBC # BLD AUTO: 4.36 10E6/UL (ref 4.4–5.9)
SODIUM SERPL-SCNC: 139 MMOL/L (ref 135–145)
TRIGL SERPL-MCNC: 144 MG/DL
TSH SERPL DL<=0.005 MIU/L-ACNC: 0.79 UIU/ML (ref 0.3–4.2)
WBC # BLD AUTO: 8.6 10E3/UL (ref 4–11)

## 2024-02-22 PROCEDURE — 80053 COMPREHEN METABOLIC PANEL: CPT | Performed by: FAMILY MEDICINE

## 2024-02-22 PROCEDURE — 84443 ASSAY THYROID STIM HORMONE: CPT | Performed by: FAMILY MEDICINE

## 2024-02-22 PROCEDURE — 80061 LIPID PANEL: CPT | Performed by: FAMILY MEDICINE

## 2024-02-22 PROCEDURE — 71046 X-RAY EXAM CHEST 2 VIEWS: CPT | Mod: TC | Performed by: RADIOLOGY

## 2024-02-22 PROCEDURE — 36415 COLL VENOUS BLD VENIPUNCTURE: CPT | Performed by: FAMILY MEDICINE

## 2024-02-22 PROCEDURE — 90471 IMMUNIZATION ADMIN: CPT | Performed by: FAMILY MEDICINE

## 2024-02-22 PROCEDURE — 85027 COMPLETE CBC AUTOMATED: CPT | Performed by: FAMILY MEDICINE

## 2024-02-22 PROCEDURE — 96127 BRIEF EMOTIONAL/BEHAV ASSMT: CPT | Performed by: FAMILY MEDICINE

## 2024-02-22 PROCEDURE — 99214 OFFICE O/P EST MOD 30 MIN: CPT | Mod: 25 | Performed by: FAMILY MEDICINE

## 2024-02-22 PROCEDURE — 90662 IIV NO PRSV INCREASED AG IM: CPT | Performed by: FAMILY MEDICINE

## 2024-02-22 RX ORDER — LISINOPRIL 10 MG/1
10 TABLET ORAL DAILY
Qty: 90 TABLET | Refills: 3 | Status: SHIPPED | OUTPATIENT
Start: 2024-02-22

## 2024-02-22 RX ORDER — ALBUTEROL SULFATE 90 UG/1
AEROSOL, METERED RESPIRATORY (INHALATION)
Qty: 18 G | Refills: 0 | Status: SHIPPED | OUTPATIENT
Start: 2024-02-22

## 2024-02-22 ASSESSMENT — PATIENT HEALTH QUESTIONNAIRE - PHQ9
10. IF YOU CHECKED OFF ANY PROBLEMS, HOW DIFFICULT HAVE THESE PROBLEMS MADE IT FOR YOU TO DO YOUR WORK, TAKE CARE OF THINGS AT HOME, OR GET ALONG WITH OTHER PEOPLE: VERY DIFFICULT
SUM OF ALL RESPONSES TO PHQ QUESTIONS 1-9: 10
SUM OF ALL RESPONSES TO PHQ QUESTIONS 1-9: 10

## 2024-02-22 NOTE — PROGRESS NOTES
Assessment & Plan   Current moderate episode of major depressive disorder, unspecified whether recurrent (H)  Stable    Cough, unspecified type  Refilled albuterol   - albuterol (PROAIR HFA/PROVENTIL HFA/VENTOLIN HFA) 108 (90 Base) MCG/ACT inhaler; INHALE 1 TO 2 PUFFS BY MOUTH EVERY 6 HOURS AS NEEDED FOR WHEEZING  - XR Chest 2 Views; Future    Other fatigue  - Comprehensive metabolic panel (BMP + Alb, Alk Phos, ALT, AST, Total. Bili, TP); Future  - CBC with platelets; Future  - TSH with free T4 reflex; Future  - Comprehensive metabolic panel (BMP + Alb, Alk Phos, ALT, AST, Total. Bili, TP)  - CBC with platelets  - TSH with free T4 reflex    Hyperlipidemia, unspecified hyperlipidemia type  Recheck today.    Essential hypertension  - lisinopril (ZESTRIL) 10 MG tablet; Take 1 tablet (10 mg) by mouth daily    Chronic kidney disease, unspecified CKD stage  Recheck today. Will consider Nephrology.    Subjective   Jj is a 74 year old, presenting for the following health issues:  HTN- On lisinopril 10 mg daily, out for a week. No   acute headache, no acute vision changes.   Depression- Taking Paxil 40 mg daily. No acute worsening depression. Denied SI/HI.  GERD- Not on med currently. No symptom. Was on Omeprazole in the past.   Lipid- tolerating Tricor.  C/O cough on and off for about one month . No fever or chills. No SOB or wheezing. No night sweat.   C/O fatigue, not new symptom. Poor appetite is improving. No dizziness.         2/22/2024     9:30 AM   Additional Questions   Roomed by Elena Santillan   Accompanied by Self   Failed to redirect to the Timeline version of the [facility].        Review of Systems  CONSTITUTIONAL:NEGATIVE  for chills, malaise, and sweats  RESP: NEGATIVE for significant cough or SOB  CV: NEGATIVE for chest pain/chest pressure      Objective    /84 (BP Location: Left arm, Patient Position: Sitting, Cuff Size: Adult Regular)   Pulse 70   Temp 98.1  F (36.7  C) (Oral)   Resp 16   Ht  "1.53 m (5' 0.24\")   Wt 55.4 kg (122 lb 3 oz)   SpO2 94%   BMI 23.67 kg/m    Body mass index is 23.67 kg/m .    Physical Exam   GENERAL: alert and no distress  NECK: Supple  RESP: lungs clear to auscultation - no rales, rhonchi or wheezes  CV: regular rates and rhythm and no murmur, click or rub  ABDOMEN: No epigastric tenderness. Normal bowel sounds   BACK: no CVA tenderness, no paralumbar tenderness  PSYCH: mentation appears normal            Signed Electronically by: Donald Pavon MD    Answers submitted by the patient for this visit:  Patient Health Questionnaire (Submitted on 2/22/2024)  If you checked off any problems, how difficult have these problems made it for you to do your work, take care of things at home, or get along with other people?: Very difficult  PHQ9 TOTAL SCORE: 10    "

## 2024-02-23 ENCOUNTER — TELEPHONE (OUTPATIENT)
Dept: FAMILY MEDICINE | Facility: CLINIC | Age: 74
End: 2024-02-23
Payer: COMMERCIAL

## 2024-02-23 NOTE — TELEPHONE ENCOUNTER
Called pt and relayed message to his daughter (C2C to file). She verbalized understanding.      Magdy Aragon, BSN RN  [facility]      ----- Message from Donald Pavon MD sent at 2/23/2024 11:54 AM CST -----  Kidney function is improving but still abnormal.  Please advise to increase daily water intake.  We will recheck at his next visit.  Please call the patient.    Dr. Donald Pavon  2/23/2024 11:54 AM

## 2024-03-28 ASSESSMENT — PATIENT HEALTH QUESTIONNAIRE - PHQ9
SUM OF ALL RESPONSES TO PHQ QUESTIONS 1-9: 1
10. IF YOU CHECKED OFF ANY PROBLEMS, HOW DIFFICULT HAVE THESE PROBLEMS MADE IT FOR YOU TO DO YOUR WORK, TAKE CARE OF THINGS AT HOME, OR GET ALONG WITH OTHER PEOPLE: NOT DIFFICULT AT ALL
SUM OF ALL RESPONSES TO PHQ QUESTIONS 1-9: 1

## 2024-03-29 ENCOUNTER — OFFICE VISIT (OUTPATIENT)
Dept: FAMILY MEDICINE | Facility: CLINIC | Age: 74
End: 2024-03-29
Payer: COMMERCIAL

## 2024-03-29 VITALS
BODY MASS INDEX: 23.87 KG/M2 | RESPIRATION RATE: 20 BRPM | SYSTOLIC BLOOD PRESSURE: 132 MMHG | DIASTOLIC BLOOD PRESSURE: 80 MMHG | HEART RATE: 76 BPM | HEIGHT: 60 IN | WEIGHT: 121.56 LBS | TEMPERATURE: 98.2 F | OXYGEN SATURATION: 93 %

## 2024-03-29 DIAGNOSIS — N18.9 CHRONIC KIDNEY DISEASE, UNSPECIFIED CKD STAGE: ICD-10-CM

## 2024-03-29 DIAGNOSIS — E78.2 MIXED HYPERLIPIDEMIA: ICD-10-CM

## 2024-03-29 DIAGNOSIS — I10 ESSENTIAL HYPERTENSION: ICD-10-CM

## 2024-03-29 DIAGNOSIS — Z00.00 ANNUAL PHYSICAL EXAM: Primary | ICD-10-CM

## 2024-03-29 DIAGNOSIS — R93.89 ABNORMAL CXR: ICD-10-CM

## 2024-03-29 DIAGNOSIS — R05.3 CHRONIC COUGH: ICD-10-CM

## 2024-03-29 DIAGNOSIS — F32.1 CURRENT MODERATE EPISODE OF MAJOR DEPRESSIVE DISORDER, UNSPECIFIED WHETHER RECURRENT (H): ICD-10-CM

## 2024-03-29 PROCEDURE — 99397 PER PM REEVAL EST PAT 65+ YR: CPT | Performed by: FAMILY MEDICINE

## 2024-03-29 PROCEDURE — 99214 OFFICE O/P EST MOD 30 MIN: CPT | Mod: 25 | Performed by: FAMILY MEDICINE

## 2024-03-29 RX ORDER — BENZONATATE 100 MG/1
100 CAPSULE ORAL 3 TIMES DAILY PRN
Qty: 60 CAPSULE | Refills: 0 | Status: SHIPPED | OUTPATIENT
Start: 2024-03-29

## 2024-03-29 RX ORDER — AZITHROMYCIN 250 MG/1
TABLET, FILM COATED ORAL
Qty: 6 TABLET | Refills: 0 | Status: SHIPPED | OUTPATIENT
Start: 2024-03-29 | End: 2024-04-03

## 2024-03-29 RX ORDER — PREDNISONE 20 MG/1
20 TABLET ORAL 2 TIMES DAILY
Qty: 10 TABLET | Refills: 0 | Status: SHIPPED | OUTPATIENT
Start: 2024-03-29 | End: 2024-04-03

## 2024-03-29 NOTE — PROGRESS NOTES
Preventive Care Visit  [facility] ARAVIND Pavon MD, Family Medicine      Annual physical exam    Chronic kidney disease, unspecified CKD stage  Last creatinine 1.34, the highest was 1.56. Recheck at next visit.    Current moderate episode of major depressive disorder, unspecified whether recurrent (H)  Stable    Mixed hyperlipidemia  Recheck at next visit.     Essential hypertension  Controlled.     Chronic cough  Oral Z-Russell and prednisone.  Ordered CT.  Follow-up in 2 weeks.  - azithromycin (ZITHROMAX) 250 MG tablet; Take 2 tablets (500 mg) by mouth daily for 1 day, THEN 1 tablet (250 mg) daily for 4 days.  - predniSONE (DELTASONE) 20 MG tablet; Take 1 tablet (20 mg) by mouth 2 times daily for 5 days  - benzonatate (TESSALON) 100 MG capsule; Take 1 capsule (100 mg) by mouth 3 times daily as needed for cough  - CT Chest w/o Contrast; Future    Abnormal CXR  - CT Chest w/o Contrast; Future     Subjective   Jj is a 74 year old, presenting for the following:    The patient is here for physical.  He is here with his daughter.  History mainly obtained from patient and daughter.      Cough is not improving. No fever or chills.  No night sweats, no weight loss.  Office mainly nonproductive.  Using albuterol inhaler 2-3 times a day every day.  No chest pain or chest pressure.  No lower extremities edema.  No tuberculosis appearing findings on chest x-ray.        3/29/2024    10:12 AM   Additional Questions   Roomed by Elena Santillan   Accompanied by Daughter : Jack        Health Care Directive  Patient does not have a Health Care Directive or Living Will:         3/28/2024   General Health   How would you rate your overall physical health? (!) FAIR   Feel stress (tense, anxious, or unable to sleep) Not at all         3/28/2024   Nutrition   Diet: Regular (no restrictions)         12/21/2022   Exercise   Frequency of exercise: 6-7 days/week         3/28/2024   Social Factors   Worry food won't last until get  money to buy more No   Food not last or not have enough money for food? No   Do you have housing?  Yes   Are you worried about losing your housing? No   Lack of transportation? No   Unable to get utilities (heat,electricity)? No         3/28/2024   Fall Risk   Fallen 2 or more times in the past year? No   Trouble with walking or balance? No          3/28/2024   Activities of Daily Living- Home Safety   Needs help with the following daily activites Telephone use    Transportation    Shopping    Preparing meals    Housework    Bathing    Laundry    Medication administration    Dressing   Safety concerns in the home None of the above         3/28/2024   Dental   Dentist two times every year? (!) NO         3/28/2024   Hearing Screening   Hearing concerns? (!) I NEED TO ASK PEOPLE TO SPEAK UP OR REPEAT THEMSELVES.    (!) TROUBLE UNDERSTANDING SOFT OR WHISPERED SPEECH.         3/28/2024   Driving Risk Screening   Patient/family members have concerns about driving No         3/28/2024   General Alertness/Fatigue Screening   Have you been more tired than usual lately? No         3/28/2024   Urinary Incontinence Screening   Bothered by leaking urine in past 6 months Yes         3/28/2024   TB Screening   Were you born outside of the [place]? Yes       Today's PHQ-9 Score:       3/28/2024     4:22 PM   PHQ-9 SCORE   PHQ-9 Total Score MyChart 1 (Minimal depression)   PHQ-9 Total Score 1         3/28/2024   Substance Use   Alcohol more than 3/day or more than 7/wk No   Do you have a current opioid prescription? No   How severe/bad is pain from 1 to 10? 5/10   Do you use any other substances recreationally? No     Social History     Tobacco Use    Smoking status: Former     Years: 45     Types: Cigarettes     Quit date: 2015     Years since quittin.0     Passive exposure: Past    Smokeless tobacco: Former    Tobacco comments:     no passive exposure   Vaping Use    Vaping Use: Never used   Substance Use Topics    Alcohol  "use: No    Drug use: No       Reviewed and updated as needed this visit by Provider                 Current providers sharing in care for this patient include:  Patient Care Team:  Donald Pavon MD as PCP - General (Family Practice)  Marcie Rivera, RN as Lead Care Coordinator  Donald Pavon MD as Assigned PCP    The following health maintenance items are reviewed in Epic and correct as of today:  Health Maintenance   Topic Date Due    RSV VACCINE (Pregnancy & 60+) (1 - 1-dose 60+ series) 04/29/2024 (Originally 1/1/2009)    DEPRESSION ACTION PLAN  08/08/2024 (Originally 1950)    ZOSTER IMMUNIZATION (1 of 2) 10/29/2024 (Originally 1/1/1999)    PHQ-9  09/29/2024    ADVANCE CARE PLANNING  12/20/2024    BMP  02/22/2025    LIPID  02/22/2025    ANNUAL REVIEW OF HM ORDERS  02/22/2025    MEDICARE ANNUAL WELLNESS VISIT  03/29/2025    FALL RISK ASSESSMENT  03/29/2025    DTAP/TDAP/TD IMMUNIZATION (3 - Td or Tdap) 06/22/2026    GLUCOSE  02/22/2027    COLORECTAL CANCER SCREENING  09/26/2027    HEPATITIS C SCREENING  Completed    INFLUENZA VACCINE  Completed    Pneumococcal Vaccine: 65+ Years  Completed    IPV IMMUNIZATION  Aged Out    HPV IMMUNIZATION  Aged Out    MENINGITIS IMMUNIZATION  Aged Out    RSV MONOCLONAL ANTIBODY  Aged Out    MICROALBUMIN  Discontinued    URINALYSIS  Discontinued    LUNG CANCER SCREENING  Discontinued    AORTIC ANEURYSM SCREENING (SYSTEM ASSIGNED)  Discontinued    COVID-19 Vaccine  Discontinued         Review of Systems  CONSTITUTIONAL: NEGATIVE for fever, chills, change in weight  CV: NEGATIVE for chest pain/chest pressure     Objective    Exam  BP (!) 140/90 (BP Location: Left arm, Patient Position: Sitting, Cuff Size: Adult Regular)   Pulse 76   Temp 98.2  F (36.8  C) (Oral)   Resp 20   Ht 1.535 m (5' 0.43\")   Wt 55.1 kg (121 lb 9 oz)   SpO2 93%   BMI 23.40 kg/m     Estimated body mass index is 23.4 kg/m  as calculated from the following:    Height as of this encounter: 1.535 m (5' 0.43\").    " Weight as of this encounter: 55.1 kg (121 lb 9 oz).    Physical Exam    Gen - alert, orientated, NAD  Eyes - fundascopic exam limited by the undialated pupil but looks symmetric  ENT - oropharynx clear, TMs clear  Neck - supple, no palpable mass or lymphadenopathy  CV - RRR, no murmur  Resp - lungs CTA  Ab - soft, nontender, no palpable mass or organomegaly   - Declined.   Extrem - warm, no edema  Neuro - CN II-XII intact  Skin - no rash, no atypical appearing lesions seen.          3/29/2024   Mini Cog   Clock Draw Score 0 Abnormal   3 Item Recall 2 objects recalled   Mini Cog Total Score 2         Signed Electronically by: Donald Pavon MD    Answers submitted by the patient for this visit:  Patient Health Questionnaire (Submitted on 3/28/2024)  If you checked off any problems, how difficult have these problems made it for you to do your work, take care of things at home, or get along with other people?: Not difficult at all  PHQ9 TOTAL SCORE: 1

## 2024-04-12 ENCOUNTER — HOSPITAL ENCOUNTER (OUTPATIENT)
Dept: CT IMAGING | Facility: HOSPITAL | Age: 74
Discharge: HOME | End: 2024-04-12
Attending: FAMILY MEDICINE | Admitting: FAMILY MEDICINE
Payer: COMMERCIAL

## 2024-04-12 DIAGNOSIS — R05.3 CHRONIC COUGH: ICD-10-CM

## 2024-04-12 DIAGNOSIS — R93.89 ABNORMAL CXR: ICD-10-CM

## 2024-04-12 PROCEDURE — 71250 CT THORAX DX C-: CPT

## 2024-04-16 ENCOUNTER — TELEPHONE (OUTPATIENT)
Dept: FAMILY MEDICINE | Facility: CLINIC | Age: 74
End: 2024-04-16
Payer: COMMERCIAL

## 2024-04-16 NOTE — TELEPHONE ENCOUNTER
----- Message from Donald Pavon MD sent at 4/16/2024  4:39 PM CDT -----  Patient no showed for his follow-up appointment.  Please call the patient to reschedule.  Will discuss CT results during office visit.  Thank you,    Dr. Donald Pavon  4/16/2024 4:35 PM

## 2024-04-17 NOTE — TELEPHONE ENCOUNTER
----- Message from Donald Pavon MD sent at 4/16/2024  4:39 PM CDT -----  Patient no showed for his follow-up appointment.  Please call the patient to reschedule.  Will discuss CT results during office visit.  Thank you,    Dr. Donald Pavon  4/16/2024 4:35 PM      It appeared patient already had an appointment scheduled for 4/23/24 with pcp.  No further action needed.    Jose Lang, RN  [facility]

## 2024-05-08 ENCOUNTER — TELEPHONE (OUTPATIENT)
Dept: FAMILY MEDICINE | Facility: CLINIC | Age: 74
End: 2024-05-08
Payer: COMMERCIAL

## 2024-05-08 NOTE — TELEPHONE ENCOUNTER
Forms/Letter Request    Type of form/letter: OTHER: [facility] (only page 12 of 15 was given to me)       Do we have the form/letter: Yes: MZ    Who is the form from? [facility]    Where did/will the form come from? Patient or family brought in       When is form/letter needed by: asap    How would you like the form/letter returned:     Patient Notified form requests are processed in 5-7 business days:Yes    Okay to leave a detailed message?: Yes at Other phone number:  [phone] (Daughter Jack)

## 2024-05-09 NOTE — TELEPHONE ENCOUNTER
Spoke to daughter Jack and advised forms are ready for . Placed copy in CCC office. No further action needed.

## 2024-06-25 ENCOUNTER — PATIENT OUTREACH (OUTPATIENT)
Dept: GERIATRIC MEDICINE | Facility: CLINIC | Age: 74
End: 2024-06-25
Payer: COMMERCIAL

## 2024-06-25 NOTE — PROGRESS NOTES
"[facility] Care Coordination Contact    Per CC, mailed client an \"Unable to Contact\" letter.      Kati Shields  Care Management Specialist   [facility]   [phone]    "

## 2024-06-25 NOTE — LETTER
June 25, 2024    MARIA DEL CARMEN HYDE  [address]   [place] [address]    Dear Maria Del Carmen:     I m your care coordinator. I ve been unable to reach you by phone. I am writing to ask you or your authorized representative to call me at [phone]. If you reach my voicemail, leave a message with your daytime phone number. Include a date and time that I can call you. If you are hearing impaired, call the [place] Relay at 032 or [phone] (klntzf-zr-bxwxni relay service).    The reason I am trying to reach you is:     [] To schedule an assessment  [x] For your six (6)-month check-in  [] Other:      Please call me as soon as you receive this letter. I look forward to speaking with you.    Sincerely,      Marcie Rivera RN  [phone]  [email or web address]        [employer]        [id number] accepted  [id number]                                                                        B  (08/2022)

## 2024-06-27 ENCOUNTER — PATIENT OUTREACH (OUTPATIENT)
Dept: GERIATRIC MEDICINE | Facility: CLINIC | Age: 74
End: 2024-06-27
Payer: COMMERCIAL

## 2024-06-27 NOTE — PROGRESS NOTES
[facility] Care Coordination Contact      [facility] Six-Month Telephone Assessment    6 month telephone assessment completed on 06/27/24.    ER visits: No  Hospitalizations: No  TCU stays: No  Significant health status changes: no changes  Falls/Injuries: No  ADL/IADL changes: No  Changes in services: No    Caregiver Assessment follow up:  Reports no falls, ER visits or hospital stays.  Statisfed with PCA and ADC.  Reports of new address at [address] [place] [address].  Donnie reports she updated [facility] yesturday of new address. [facility] medical updated on address via e-mail regarding pull up delivery.     Goals: See POC in chart for goal progress documentation.      Will see member in 6 months for an annual health risk assessment.   Encouraged member to call CC with any questions or concerns in the meantime.     Marcie Rivera RN PHN LSW  [facility]  [phone]

## 2024-07-22 ENCOUNTER — TELEPHONE (OUTPATIENT)
Dept: FAMILY MEDICINE | Facility: CLINIC | Age: 74
End: 2024-07-22
Payer: COMMERCIAL

## 2024-07-23 NOTE — TELEPHONE ENCOUNTER
Forms/Letter Request    Type of form/letter: OTHER: Medical Report       Do we have the form/letter: Yes: in blue folder    Who is the form from? [facility] Care (if other please explain)    Where did/will the form come from? Patient or family brought in       When is form/letter needed by: 07/26/2024    How would you like the form/letter returned: Fax : [phone]    Patient Notified form requests are processed in 5-7 business days:Yes    Okay to leave a detailed message?: Yes at Home number on file [phone]

## 2024-07-24 ENCOUNTER — PATIENT OUTREACH (OUTPATIENT)
Dept: GERIATRIC MEDICINE | Facility: CLINIC | Age: 74
End: 2024-07-24
Payer: COMMERCIAL

## 2024-07-24 NOTE — PROGRESS NOTES
[facility] Care Coordination Contact    Subject:  Request to change adult day care from [facility] to [facility], effective 07/08/24      Provider: [facility]        (P) [phone]          (F) [phone]        (E) [email or web address]         NPI/Health Plan ID: [id number]        David, enrollement staff: cell: [phone]  Issue:  Received a phone call on 7/8/24 from [facility] staff, David regarding the service agreement, but family and Jj Collier wanted to try the service before committing.    Outcome: On 7/24/24, CC spoke with daughter, Jack Palmer and they would like to continue adult day care with [facility]. Spoke wit David from [facility] day [facility] regarding Jj's decision and will send service agreement.   Next Steps: CC completed member sig for Jj to sign will task CMS to authorize service agreement and send letter.    Marcie Rivera RN PHN LSW  [facility]  [phone]

## 2024-07-24 NOTE — PROGRESS NOTES
[facility] Care Coordination Contact    Received  from care coordinator.  Completed following tasks: Submitted referrals/auths request to change ADC  agency Member Signature - POC Change:  Per CC, member has made a change to their POC.  Care Plan Change Letter mailed to member for signature with a self-addressed return envelope.    Sobia Ray  Care Management Specialist  [facility]  [phone]

## 2024-11-13 ENCOUNTER — PATIENT OUTREACH (OUTPATIENT)
Dept: GERIATRIC MEDICINE | Facility: CLINIC | Age: 74
End: 2024-11-13
Payer: COMMERCIAL

## 2024-11-13 DIAGNOSIS — H54.40 BLINDNESS OF LEFT EYE, UNSPECIFIED RIGHT EYE VISUAL IMPAIRMENT CATEGORY: Primary | ICD-10-CM

## 2024-11-13 ASSESSMENT — LIFESTYLE VARIABLES
AUDIT-C TOTAL SCORE: 0
SKIP TO QUESTIONS 9-10: 1

## 2024-11-16 NOTE — PROGRESS NOTES
[facility] Care Coordination Contact    [facility] Home Visit Assessment     Home visit for Health Risk Assessment with Jj Rios completed on November 13th, 2024    Type of residence:: Private home - stairs  Current living arrangement:: I live in a private home with family     Assessment completed with:: Patient, Children, VM-chart review    Current Care Plan  Member currently receiving the following home care services:     Member currently receiving the following community resources: PCA, DME, Transportation Services, Day Care, [facility]      Medication Review  Medication reconciliation completed in Epic: Yes  Medication set-up & administration: Family/informal caregiver sets up every two weeks.  Self-administers medications and Family caregiver administers medications.  Medication Risk Assessment Medication (1 or more, place referral to MTM): N/A: No risk factors identified  MTM Referral Placed: No: No risk factors idenified    Mental/Behavioral Health   Depression Screening:   PHQ-2 Total Score (Adult) - Positive if 3 or more points; Administer PHQ-9 if positive: 0       Mental health DX:: No   Mental health DX how managed:: Medication, Other (ARHMS worker)    Falls Assessment:   Fallen 2 or more times in the past year?: No        ADL/IADL Dependencies:   Dependent ADLs:: Dressing, Grooming, Bathing, Transfers, Incontinence, Ambulation-cane, Toileting  Dependent IADLs:: Cleaning, Cooking, Laundry, Shopping, Meal Preparation, Transportation, Money Management, Incontinence, Medication Management    Health Plan sponsored benefits: [employer] MSC+: Shared information regarding preventative health screening and health plan supplemental benefits/incentives. Reviewed medication disposal form.    PCA Assessment completed at visit: Yes Annual PCA assessment indicated 5.5 hours per day of PCA. This is the same as the previous assessment.     Elderly Waiver Eligibility: Yes-will continue on EW    Care Plan &  Recommendations: Jj Collier is a 74-year-old man who speaks Yolie and resides with his son, daughter in law and 4 grandchildren. jJ Collier lost his wife in 2016. He has 5 children in total-3 sons and 1 daughter living in [place], and another daughter residing in [place] [place]. Jack Palmer is his primary caretaker and provides formal and informal support. Jj Collier reports his family, health and sherrill are important to him.    Based on this assessment, Jj Collier will continue to be eligible for EW and CFSS services. Jj Collier has selected [facility] consultation services to assist with CFSS ([facility] and Support). Jj Collier will continue with EW and CFSS/PCA services to support his safety and well-being in the community.   PCA/CFSS: 5.5 hours per day of CFSS/PCA services to assist with ADLs and IADLS, including dressing, grooming, bathing, transfers, walking, and toileting.   ADC: ADC 3x a week with provided transportation.   ARHMS: Continue working with his ARHMS worker 3-4x a week  Incontinence supplies: Receive monthly pullups.   Informal care: His family will provide both formal and informal services assistance, such as scheduling appointments, managing finances completing paperwork and providing transportation.   DME request:  CC will request for cane as his current one is broken.    [place] information.    Follow-Up Plan: Member informed of future contact, plan to f/u with member with a 6 month telephone assessment.  Contact information shared with member and family, encouraged member to call with any questions or concerns at any time.    [place] care continuum providers: Please see Snapshot and Care Management Flowsheets for Specific details of care plan.    DME supply(s) have been requested by the patient. DME orders(s) have been queued up and pended for the provider to review. Patient reports the need for DME supplies due to broken cane. Once completed, please  route the encounter to care coordinator to fax completed documentation and order requisition to [facility].       This CC note routed to PCP, Donald Pavon.

## 2024-11-18 ENCOUNTER — PATIENT OUTREACH (OUTPATIENT)
Dept: GERIATRIC MEDICINE | Facility: CLINIC | Age: 74
End: 2024-11-18
Payer: COMMERCIAL

## 2024-11-18 NOTE — PROGRESS NOTES
[facility] Care Coordination Contact    Received after visit chart from care coordinator.  Completed following tasks: Mailed copy of signature sheet for member to sign and return in SASE , Mailed [place] Choices signature sheet pages 3-4, Mailed Safe Medication Disposal , Submitted referrals/auths for ADC transportation, and Updated services in Database  , Provider Signature - No Support Plan Shared:  Member indicates that they do not want their support plan shared with any EW providers.    [facility]:  Emailed required PCA documents to [employer].  and mailed copy to member.      Sobia Ray  Care Management Specialist  [facility]  [phone]

## 2024-11-18 NOTE — LETTER
November 18, 2024       MARIA DEL CARMEN HYDE  [address]  [place] [address]      Dear Maria Del Carmen,    At [facility], we re dedicated to improving your health and wellness. Enclosed is the Support Plan developed with you on 11/13/2024. Please review the Support Plan carefully.    As a reminder, during your visit we talked about:   Ways to manage your physical and mental health   Using health care to maintain and improve your health    Your preventive care needs      Remember to contact your care coordinator if you:   Are hospitalized or plan to be hospitalized    Have a fall     Have a change in your physical or mental health   Need help finding support or services    If you have questions or don t agree with your Support Plan, call me at [phone]. You can also call me if your needs change. TTY users call the [place] Relay at 217 or [phone] (zvmffu-si-cuxkux relay service).    Sincerely,         Marcie Rivera RN  [phone]  [email or web address]        [employer]                [id number] accepted     (06/2024)                [address] [place] [address]  [phone]  fax [phone]  [facility]

## 2024-12-20 DIAGNOSIS — K21.9 GASTROESOPHAGEAL REFLUX DISEASE WITHOUT ESOPHAGITIS: ICD-10-CM

## 2024-12-20 DIAGNOSIS — Z76.0 ENCOUNTER FOR MEDICATION REFILL: ICD-10-CM

## 2025-01-09 NOTE — TELEPHONE ENCOUNTER
Tri Loco, RN to [facility] Regarding result: ASPIRIN LOW DOSE 81 MG EC tablet [Pharmacy Med Name: ASPIRIN 81MG EC LOW DOSE TABLETS]   JG    12/20/24 12:56 PM  Omeprazole  Clinic RN: Please investigate patient's chart or contact patient if the information cannot be found because the medication is listed as historical or discontinued. Confirm patient is taking this medication. Document findings and route refill encounter to provider for approval or denial.    Aspirin route to PCP as needs updated diagnosis

## 2025-01-09 NOTE — TELEPHONE ENCOUNTER
"Writer attempt #1 to call patient with the help of a \"Yolie\"   (ID # [id number]) regarding Refill RN's message below. No answer, left non-detailed VM with call back number.    If patient returns call back, please review Refill RN's message with patient. Obtain responses and route to prescribing provider. Thanks!    Emma Solares, RAQUELN, RN, PHN   [facility]    "

## 2025-01-14 RX ORDER — ASPIRIN 81 MG/1
TABLET ORAL
Qty: 90 TABLET | Refills: 1 | Status: SHIPPED | OUTPATIENT
Start: 2025-01-14

## 2025-02-27 ENCOUNTER — PATIENT OUTREACH (OUTPATIENT)
Dept: CARE COORDINATION | Facility: CLINIC | Age: 75
End: 2025-02-27
Payer: COMMERCIAL

## 2025-03-13 ENCOUNTER — PATIENT OUTREACH (OUTPATIENT)
Dept: CARE COORDINATION | Facility: CLINIC | Age: 75
End: 2025-03-13
Payer: COMMERCIAL

## 2025-04-03 DIAGNOSIS — F32.1 CURRENT MODERATE EPISODE OF MAJOR DEPRESSIVE DISORDER, UNSPECIFIED WHETHER RECURRENT (H): ICD-10-CM

## 2025-04-03 DIAGNOSIS — Z76.0 ENCOUNTER FOR MEDICATION REFILL: ICD-10-CM

## 2025-04-03 DIAGNOSIS — E78.1 PURE HYPERGLYCERIDEMIA: ICD-10-CM

## 2025-04-03 DIAGNOSIS — I10 ESSENTIAL HYPERTENSION: ICD-10-CM

## 2025-04-03 RX ORDER — PAROXETINE 40 MG/1
TABLET, FILM COATED ORAL
Qty: 60 TABLET | Refills: 0 | Status: SHIPPED | OUTPATIENT
Start: 2025-04-03

## 2025-04-03 RX ORDER — LISINOPRIL 10 MG/1
10 TABLET ORAL DAILY
Qty: 90 TABLET | Refills: 0 | Status: SHIPPED | OUTPATIENT
Start: 2025-04-03

## 2025-04-03 RX ORDER — SIMVASTATIN 20 MG
TABLET ORAL
Qty: 60 TABLET | Refills: 0 | Status: SHIPPED | OUTPATIENT
Start: 2025-04-03

## 2025-04-03 NOTE — TELEPHONE ENCOUNTER
3 months refill approved. Due for office visit before next refill. Please call the patient.    Dr. Donald Pavon  4/3/2025 4:32 PM

## 2025-04-17 ENCOUNTER — MEDICAL CORRESPONDENCE (OUTPATIENT)
Dept: HEALTH INFORMATION MANAGEMENT | Facility: CLINIC | Age: 75
End: 2025-04-17
Payer: COMMERCIAL

## 2025-04-17 ENCOUNTER — TELEPHONE (OUTPATIENT)
Dept: FAMILY MEDICINE | Facility: CLINIC | Age: 75
End: 2025-04-17
Payer: COMMERCIAL

## 2025-04-17 NOTE — TELEPHONE ENCOUNTER
(TC) collected forms from . Forms pre-filled for provider review, completion, and signature. Forms placed in provider s blue folder. Thanks.

## 2025-04-17 NOTE — TELEPHONE ENCOUNTER
Forms/Letter Request    Type of form/letter: DME (wheelchair, hospital bed)    Type of DME requested: DME  PULL UP     Do we have the form/letter: Yes: incoming fax bin for      Who is the form from? [employer]  (if other please explain)    Where did/will the form come from? form was faxed in    When is form/letter needed by: ASAP    How would you like the form/letter returned: Fax : [phone]

## 2025-04-23 NOTE — TELEPHONE ENCOUNTER
Form reviewed, completed, and signed by physician. Returned to sender as requested. Faxed to [phone]. Copied to EHR scanning.

## 2025-05-22 ENCOUNTER — PATIENT OUTREACH (OUTPATIENT)
Dept: GERIATRIC MEDICINE | Facility: CLINIC | Age: 75
End: 2025-05-22
Payer: COMMERCIAL

## 2025-05-22 NOTE — PROGRESS NOTES
[facility] Care Coordination Contact      [facility] Six-Month Telephone Assessment    6 month telephone assessment completed on 05/22/2025.    ER visits: No  Hospitalizations: No  TCU stays: No  Significant health status changes: No health changes reported  Falls/Injuries: No  ADL/IADL changes: No  Changes in services: No    Caregiver Assessment follow up:  No ER visits or hospitalizations.     Goals: See Support Plan for goal progress documentation.  Emailed [employer] regarding status update on CFSS CS SDP.  Jack Palmer indicated that Jj Boyer will meet with Aditya tomorrow to sign paperwork for CS SDP.      Will see member in 6 months for an annual health risk assessment.   Encouraged member to call CC with any questions or concerns in the meantime.     Marcie Rivera RN PHN LSW  [facility]  [phone]

## 2025-05-28 ENCOUNTER — PATIENT OUTREACH (OUTPATIENT)
Dept: GERIATRIC MEDICINE | Facility: CLINIC | Age: 75
End: 2025-05-28
Payer: COMMERCIAL

## 2025-05-28 NOTE — LETTER
May 28, 2025       MARIA DEL CARMEN HYDE  [address] [place] [address]      Dear Maria Del Carmen,    Thank you for talking with me recently about your health care needs. I enjoyed speaking with you. Based on our conversation, here is information about CFSS Service Delivery Plan.       If you have questions about this information, please call me at [phone]. If you get my voicemail, leave a message with your name, [employer] member ID number and phone number. TTY users, call the [place] Relay at 355 or [phone] (jcoegt-yx-qoxhmr relay service).     Sincerely,           Marcie Rivera RN  [phone]  [email or web address]        [employer]                      [id number] accepted  [id number]      (11/2021)

## 2025-05-28 NOTE — PROGRESS NOTES
[facility] Care Coordination Contact    Completed following tasks: Sent copy of service delivery plan and addendum to member and [facility]. Sent copy of revised support plan to member. Submitted auth to health plan. Updated database.    Annette Chapman (My-Lee)  Care Management Specialist  [facility]  [phone]

## 2025-06-11 ENCOUNTER — PATIENT OUTREACH (OUTPATIENT)
Dept: GERIATRIC MEDICINE | Facility: CLINIC | Age: 75
End: 2025-06-11
Payer: COMMERCIAL

## 2025-06-11 NOTE — PROGRESS NOTES
[facility] Care Coordination Contact    Called member via  and left a voice mail to remind member to call for this CHW if member requires assistance with their medical assistance renewal. In the message notified member that the paperwork is due by June. Provided this CHW contact information.     AD Ozuna  [facility]  [phone]

## 2025-07-16 ENCOUNTER — TELEPHONE (OUTPATIENT)
Dept: FAMILY MEDICINE | Facility: CLINIC | Age: 75
End: 2025-07-16
Payer: COMMERCIAL

## 2025-07-16 NOTE — TELEPHONE ENCOUNTER
Patient Quality Outreach    Patient is due for the following:   Hypertension -  BP check  Physical Annual Wellness Visit      Topic Date Due    Zoster (Shingles) Vaccine (1 of 2) Never done       Action(s) Taken:   If patient calls back, schedule a Adult Preventative    Type of outreach:    Phone, left message for patient/parent to call back.    Questions for provider review:    None         Laci Mays MA  Chart routed to None.

## 2025-07-17 ENCOUNTER — OFFICE VISIT (OUTPATIENT)
Dept: FAMILY MEDICINE | Facility: CLINIC | Age: 75
End: 2025-07-17
Payer: COMMERCIAL

## 2025-07-17 ENCOUNTER — TELEPHONE (OUTPATIENT)
Dept: FAMILY MEDICINE | Facility: CLINIC | Age: 75
End: 2025-07-17

## 2025-07-17 VITALS
WEIGHT: 127.8 LBS | OXYGEN SATURATION: 97 % | DIASTOLIC BLOOD PRESSURE: 83 MMHG | SYSTOLIC BLOOD PRESSURE: 134 MMHG | HEIGHT: 60 IN | HEART RATE: 66 BPM | RESPIRATION RATE: 16 BRPM | TEMPERATURE: 98.1 F | BODY MASS INDEX: 25.09 KG/M2

## 2025-07-17 DIAGNOSIS — F32.1 CURRENT MODERATE EPISODE OF MAJOR DEPRESSIVE DISORDER, UNSPECIFIED WHETHER RECURRENT (H): ICD-10-CM

## 2025-07-17 DIAGNOSIS — R73.9 ELEVATED BLOOD SUGAR: ICD-10-CM

## 2025-07-17 DIAGNOSIS — R29.898 WEAKNESS OF BOTH LOWER EXTREMITIES: Primary | ICD-10-CM

## 2025-07-17 DIAGNOSIS — M79.662 PAIN IN BOTH LOWER LEGS: ICD-10-CM

## 2025-07-17 DIAGNOSIS — Z91.81 AT RISK FOR INJURY RELATED TO FALL: ICD-10-CM

## 2025-07-17 DIAGNOSIS — E78.5 HYPERLIPIDEMIA, UNSPECIFIED HYPERLIPIDEMIA TYPE: ICD-10-CM

## 2025-07-17 DIAGNOSIS — M79.661 PAIN IN BOTH LOWER LEGS: ICD-10-CM

## 2025-07-17 DIAGNOSIS — L85.3 DRY SKIN DERMATITIS: ICD-10-CM

## 2025-07-17 DIAGNOSIS — R35.1 FREQUENT URINATION AT NIGHT: ICD-10-CM

## 2025-07-17 DIAGNOSIS — Z12.5 SCREENING FOR PROSTATE CANCER: ICD-10-CM

## 2025-07-17 DIAGNOSIS — R41.3 MEMORY PROBLEM: ICD-10-CM

## 2025-07-17 DIAGNOSIS — H53.8 BLURRED VISION: ICD-10-CM

## 2025-07-17 DIAGNOSIS — N18.9 CHRONIC KIDNEY DISEASE, UNSPECIFIED CKD STAGE: ICD-10-CM

## 2025-07-17 DIAGNOSIS — I10 ESSENTIAL HYPERTENSION: ICD-10-CM

## 2025-07-17 LAB
EST. AVERAGE GLUCOSE BLD GHB EST-MCNC: 128 MG/DL
HBA1C MFR BLD: 6.1 % (ref 0–5.6)

## 2025-07-17 RX ORDER — TRIAMCINOLONE ACETONIDE 1 MG/G
CREAM TOPICAL 2 TIMES DAILY
Qty: 45 G | Refills: 1 | Status: SHIPPED | OUTPATIENT
Start: 2025-07-17

## 2025-07-17 RX ORDER — ALBUTEROL SULFATE 90 UG/1
INHALANT RESPIRATORY (INHALATION)
Qty: 18 G | Refills: 0 | Status: SHIPPED | OUTPATIENT
Start: 2025-07-17

## 2025-07-17 RX ORDER — FLUTICASONE PROPIONATE 50 MCG
SPRAY, SUSPENSION (ML) NASAL
Status: CANCELLED | OUTPATIENT
Start: 2025-07-17

## 2025-07-17 RX ORDER — ALBUTEROL SULFATE 90 UG/1
INHALANT RESPIRATORY (INHALATION)
Qty: 18 G | Refills: 0 | Status: CANCELLED | OUTPATIENT
Start: 2025-07-17

## 2025-07-17 RX ORDER — GABAPENTIN 300 MG/1
300 CAPSULE ORAL AT BEDTIME
Qty: 90 CAPSULE | Refills: 1 | Status: SHIPPED | OUTPATIENT
Start: 2025-07-17

## 2025-07-17 ASSESSMENT — PATIENT HEALTH QUESTIONNAIRE - PHQ9
10. IF YOU CHECKED OFF ANY PROBLEMS, HOW DIFFICULT HAVE THESE PROBLEMS MADE IT FOR YOU TO DO YOUR WORK, TAKE CARE OF THINGS AT HOME, OR GET ALONG WITH OTHER PEOPLE: NOT DIFFICULT AT ALL
SUM OF ALL RESPONSES TO PHQ QUESTIONS 1-9: 3
SUM OF ALL RESPONSES TO PHQ QUESTIONS 1-9: 3

## 2025-07-17 NOTE — LETTER
July 17, 2025      Jj Rios  [address] [place] [address]        To Whom It May Concern:    Jj Rios is under my medical care and was seen on 7/17/2025.  Patient reports recurrent urination at night needing to get up 3 to 4 times a night.  He also reports leg weakness and sometimes confusion especially at night.  He reports recent falls when he gets up at night due to weakness.  He cannot see well at night.  Please consider reevaluating for PCA hours including at night if possible.        Sincerely,        Donald Pavon MD    Electronically signed

## 2025-07-17 NOTE — PROGRESS NOTES
Weakness of both lower extremities  Letter provided for PCA agency, see the document under communication.  Recommended PT but patient declined.    Pain in both lower legs  We will try gabapentin.  - gabapentin (NEURONTIN) 300 MG capsule; Take 1 capsule (300 mg) by mouth at bedtime.    At risk for injury related to fall      Frequent urination at night  PSA and A1c today.  - Comprehensive metabolic panel (BMP + Alb, Alk Phos, ALT, AST, Total. Bili, TP)    Essential hypertension  Blood pressure without medication.  Will continue to monitor.    Elevated blood sugar  A1c 6.1 today.  Will continue to monitor.  - Hemoglobin A1c    Hyperlipidemia, unspecified hyperlipidemia type  - Lipid Profile    Current moderate episode of major depressive disorder, unspecified whether recurrent (H)  Was on Paxil in the past.  Seems to be doing well without medication currently.  Will continue to monitor symptoms.    Memory problem  Will consider referral to memory clinic.    Chronic kidney disease, unspecified CKD stage  Referral to nephrology if indicated.  - Comprehensive metabolic panel (BMP + Alb, Alk Phos, ALT, AST, Total. Bili, TP)    Dry skin dermatitis  - triamcinolone (KENALOG) 0.1 % external cream; Apply topically 2 times daily.    Blurred vision  Offered referral to ophthalmology but patient elected to defer for now.    Screening for prostate cancer  - PSA, screen     Subjective   Jj is a 75 year old male with history of depression, memory problem, hypertension, CKD and others here requesting a letter to increase PCA hours.  Patient is here with his daughter and [employer]  worker.  History obtained from the patient and daughter.  Patient reports frequent urination at night, leg weakness, blurred vision and frequent fall night.  No major injury from fall so far.  He has history of depression, was on medication.  No worsening depression reported.  Denies suicidal or homicidal ideations.  His other concern today is itchiness in  "rash on the back.  The lesion.  He was on medication for hypertension in the past but has not been taking it for a very long time.        7/17/2025     3:11 PM   Additional Questions   Roomed by artemio mcdaniel   Accompanied by Daughter and arm[employer] worker       Review of Systems  CONSTITUTIONAL: NEGATIVE for fever, chills, change in weight  ENT/MOUTH: NEGATIVE for ear, mouth and throat problems  RESP: NEGATIVE for significant cough or SOB  CV: NEGATIVE for chest pain/chest pressure      Objective    /83   Pulse 66   Temp 98.1  F (36.7  C) (Oral)   Resp 16   Ht 1.535 m (5' 0.43\")   Wt 58 kg (127 lb 12.8 oz)   SpO2 97%   BMI 24.61 kg/m    Body mass index is 24.61 kg/m .  Physical Exam   GENERAL: alert and no distress  NECK: Supple  RESP: lungs clear to auscultation - no rales, rhonchi or wheezes  CV: regular rates and rhythm and no murmur, click or rub  MS: no gross musculoskeletal defects noted, no edema  SKIN: Area of dry skin dermatitis on mid back.  No suspicious lesions.  PSYCH: mentation appears normal    I spent a total of more than 40  minutes on the date of encounter reviewing medical records, evaluating the patient, coordinating care and documenting in the EHR, as detailed above.           Signed Electronically by: Donald Pavon MD    "

## 2025-07-29 NOTE — TELEPHONE ENCOUNTER
Patient Quality Outreach    Patient is due for the following:   Physical Preventive Adult Physical      Topic Date Due    Zoster (Shingles) Vaccine (1 of 2) Never done       Action(s) Taken:   Patient has upcoming appointment, these items will be addressed at that time.    Type of outreach:    Chart review performed, no outreach needed.    Questions for provider review:    None         Laci Mays MA  Chart routed to None.

## 2025-08-13 DIAGNOSIS — E78.1 PURE HYPERGLYCERIDEMIA: ICD-10-CM

## 2025-08-13 DIAGNOSIS — F32.1 CURRENT MODERATE EPISODE OF MAJOR DEPRESSIVE DISORDER, UNSPECIFIED WHETHER RECURRENT (H): ICD-10-CM

## 2025-08-13 DIAGNOSIS — Z76.0 ENCOUNTER FOR MEDICATION REFILL: ICD-10-CM

## 2025-08-13 RX ORDER — SIMVASTATIN 20 MG
20 TABLET ORAL AT BEDTIME
Qty: 60 TABLET | Refills: 0 | Status: SHIPPED | OUTPATIENT
Start: 2025-08-13

## 2025-08-13 RX ORDER — PAROXETINE 40 MG/1
40 TABLET, FILM COATED ORAL EVERY MORNING
Qty: 60 TABLET | Refills: 0 | Status: SHIPPED | OUTPATIENT
Start: 2025-08-13

## 2025-08-19 ENCOUNTER — TELEPHONE (OUTPATIENT)
Dept: FAMILY MEDICINE | Facility: CLINIC | Age: 75
End: 2025-08-19
Payer: COMMERCIAL

## 2025-08-25 ENCOUNTER — VIRTUAL VISIT (OUTPATIENT)
Dept: INTERPRETER SERVICES | Facility: CLINIC | Age: 75
End: 2025-08-25

## 2025-08-25 ENCOUNTER — OFFICE VISIT (OUTPATIENT)
Dept: URGENT CARE | Facility: URGENT CARE | Age: 75
End: 2025-08-25
Payer: COMMERCIAL

## 2025-08-25 ENCOUNTER — ALLIED HEALTH/NURSE VISIT (OUTPATIENT)
Dept: FAMILY MEDICINE | Facility: CLINIC | Age: 75
End: 2025-08-25
Payer: COMMERCIAL

## 2025-08-25 ENCOUNTER — HOSPITAL ENCOUNTER (OUTPATIENT)
Dept: ULTRASOUND IMAGING | Facility: HOSPITAL | Age: 75
Discharge: HOME | End: 2025-08-25
Payer: COMMERCIAL

## 2025-08-25 ENCOUNTER — NURSE TRIAGE (OUTPATIENT)
Dept: FAMILY MEDICINE | Facility: CLINIC | Age: 75
End: 2025-08-25

## 2025-08-25 ENCOUNTER — HOSPITAL ENCOUNTER (OUTPATIENT)
Dept: GENERAL RADIOLOGY | Facility: HOSPITAL | Age: 75
Discharge: HOME | End: 2025-08-25
Payer: COMMERCIAL

## 2025-08-25 VITALS
TEMPERATURE: 97.2 F | DIASTOLIC BLOOD PRESSURE: 72 MMHG | RESPIRATION RATE: 20 BRPM | SYSTOLIC BLOOD PRESSURE: 114 MMHG | OXYGEN SATURATION: 96 % | HEART RATE: 51 BPM

## 2025-08-25 DIAGNOSIS — M79.662 PAIN OF LEFT LOWER LEG: ICD-10-CM

## 2025-08-25 DIAGNOSIS — M79.661 PAIN IN BOTH LOWER LEGS: Primary | ICD-10-CM

## 2025-08-25 DIAGNOSIS — M79.672 LEFT FOOT PAIN: ICD-10-CM

## 2025-08-25 DIAGNOSIS — M79.672 LEFT FOOT PAIN: Primary | ICD-10-CM

## 2025-08-25 DIAGNOSIS — M79.662 PAIN IN BOTH LOWER LEGS: Primary | ICD-10-CM

## 2025-08-25 DIAGNOSIS — N18.9 CHRONIC KIDNEY DISEASE, UNSPECIFIED CKD STAGE: ICD-10-CM

## 2025-08-25 LAB — URATE SERPL-MCNC: 8.4 MG/DL (ref 3.4–7)

## 2025-08-25 PROCEDURE — 84550 ASSAY OF BLOOD/URIC ACID: CPT

## 2025-08-25 PROCEDURE — 93971 EXTREMITY STUDY: CPT | Mod: LT

## 2025-08-25 PROCEDURE — 73630 X-RAY EXAM OF FOOT: CPT | Mod: LT

## 2025-08-25 PROCEDURE — T1013 SIGN LANG/ORAL INTERPRETER: HCPCS | Mod: U4

## 2025-08-25 PROCEDURE — 99207 PR NO CHARGE NURSE ONLY: CPT

## 2025-08-25 PROCEDURE — 3078F DIAST BP <80 MM HG: CPT

## 2025-08-25 PROCEDURE — 99215 OFFICE O/P EST HI 40 MIN: CPT

## 2025-08-25 PROCEDURE — 3074F SYST BP LT 130 MM HG: CPT

## 2025-08-25 PROCEDURE — 36415 COLL VENOUS BLD VENIPUNCTURE: CPT

## 2025-08-25 RX ORDER — METHYLPREDNISOLONE 4 MG/1
TABLET ORAL
Qty: 21 TABLET | Refills: 0 | Status: SHIPPED | OUTPATIENT
Start: 2025-08-25